# Patient Record
Sex: MALE | Race: WHITE | NOT HISPANIC OR LATINO | Employment: OTHER | URBAN - METROPOLITAN AREA
[De-identification: names, ages, dates, MRNs, and addresses within clinical notes are randomized per-mention and may not be internally consistent; named-entity substitution may affect disease eponyms.]

---

## 2017-06-12 ENCOUNTER — ALLSCRIPTS OFFICE VISIT (OUTPATIENT)
Dept: OTHER | Facility: OTHER | Age: 67
End: 2017-06-12

## 2017-06-12 DIAGNOSIS — M25.561 PAIN IN RIGHT KNEE: ICD-10-CM

## 2017-09-20 ENCOUNTER — ALLSCRIPTS OFFICE VISIT (OUTPATIENT)
Dept: OTHER | Facility: OTHER | Age: 67
End: 2017-09-20

## 2017-09-20 DIAGNOSIS — M10.9 GOUT: ICD-10-CM

## 2017-09-20 DIAGNOSIS — Z12.5 ENCOUNTER FOR SCREENING FOR MALIGNANT NEOPLASM OF PROSTATE: ICD-10-CM

## 2017-09-20 DIAGNOSIS — I35.0 NONRHEUMATIC AORTIC VALVE STENOSIS: ICD-10-CM

## 2017-09-20 DIAGNOSIS — I10 ESSENTIAL (PRIMARY) HYPERTENSION: ICD-10-CM

## 2017-09-20 DIAGNOSIS — I71.2 THORACIC AORTIC ANEURYSM WITHOUT RUPTURE (HCC): ICD-10-CM

## 2017-10-10 ENCOUNTER — TRANSCRIBE ORDERS (OUTPATIENT)
Dept: LAB | Facility: CLINIC | Age: 67
End: 2017-10-10

## 2017-10-10 ENCOUNTER — TRANSCRIBE ORDERS (OUTPATIENT)
Dept: ADMINISTRATIVE | Facility: HOSPITAL | Age: 67
End: 2017-10-10

## 2017-10-10 ENCOUNTER — APPOINTMENT (OUTPATIENT)
Dept: LAB | Facility: CLINIC | Age: 67
End: 2017-10-10
Payer: MEDICARE

## 2017-10-10 ENCOUNTER — GENERIC CONVERSION - ENCOUNTER (OUTPATIENT)
Dept: OTHER | Facility: OTHER | Age: 67
End: 2017-10-10

## 2017-10-10 DIAGNOSIS — M10.9 GOUT: ICD-10-CM

## 2017-10-10 DIAGNOSIS — Z12.5 ENCOUNTER FOR SCREENING FOR MALIGNANT NEOPLASM OF PROSTATE: ICD-10-CM

## 2017-10-10 DIAGNOSIS — D49.2 ODONTOGENIC TUMOR: Primary | ICD-10-CM

## 2017-10-10 DIAGNOSIS — I10 ESSENTIAL (PRIMARY) HYPERTENSION: ICD-10-CM

## 2017-10-10 LAB
ALBUMIN SERPL BCP-MCNC: 3.5 G/DL (ref 3.5–5)
ALP SERPL-CCNC: 80 U/L (ref 46–116)
ALT SERPL W P-5'-P-CCNC: 41 U/L (ref 12–78)
ANION GAP SERPL CALCULATED.3IONS-SCNC: 6 MMOL/L (ref 4–13)
AST SERPL W P-5'-P-CCNC: 29 U/L (ref 5–45)
BILIRUB SERPL-MCNC: 0.46 MG/DL (ref 0.2–1)
BUN SERPL-MCNC: 15 MG/DL (ref 5–25)
CALCIUM SERPL-MCNC: 9.3 MG/DL (ref 8.3–10.1)
CHLORIDE SERPL-SCNC: 106 MMOL/L (ref 100–108)
CHOLEST SERPL-MCNC: 279 MG/DL (ref 50–200)
CO2 SERPL-SCNC: 29 MMOL/L (ref 21–32)
CREAT SERPL-MCNC: 0.85 MG/DL (ref 0.6–1.3)
GFR SERPL CREATININE-BSD FRML MDRD: 90 ML/MIN/1.73SQ M
GLUCOSE P FAST SERPL-MCNC: 97 MG/DL (ref 65–99)
HDLC SERPL-MCNC: 47 MG/DL (ref 40–60)
LDLC SERPL CALC-MCNC: 158 MG/DL (ref 0–100)
POTASSIUM SERPL-SCNC: 4.9 MMOL/L (ref 3.5–5.3)
PROT SERPL-MCNC: 7.1 G/DL (ref 6.4–8.2)
PSA SERPL-MCNC: 3.4 NG/ML (ref 0–4)
SODIUM SERPL-SCNC: 141 MMOL/L (ref 136–145)
TRIGL SERPL-MCNC: 369 MG/DL
URATE SERPL-MCNC: 4.3 MG/DL (ref 4.2–8)

## 2017-10-10 PROCEDURE — 84550 ASSAY OF BLOOD/URIC ACID: CPT

## 2017-10-10 PROCEDURE — 80061 LIPID PANEL: CPT

## 2017-10-10 PROCEDURE — 80053 COMPREHEN METABOLIC PANEL: CPT

## 2017-10-10 PROCEDURE — G0103 PSA SCREENING: HCPCS

## 2017-10-10 PROCEDURE — 36415 COLL VENOUS BLD VENIPUNCTURE: CPT

## 2017-10-18 ENCOUNTER — GENERIC CONVERSION - ENCOUNTER (OUTPATIENT)
Dept: OTHER | Facility: OTHER | Age: 67
End: 2017-10-18

## 2017-10-18 ENCOUNTER — APPOINTMENT (OUTPATIENT)
Dept: CT IMAGING | Facility: HOSPITAL | Age: 67
End: 2017-10-18
Attending: OTOLARYNGOLOGY
Payer: MEDICARE

## 2017-10-18 ENCOUNTER — HOSPITAL ENCOUNTER (OUTPATIENT)
Dept: CT IMAGING | Facility: HOSPITAL | Age: 67
Discharge: HOME | End: 2017-10-18
Payer: MEDICARE

## 2017-10-18 ENCOUNTER — HOSPITAL ENCOUNTER (OUTPATIENT)
Dept: NON INVASIVE DIAGNOSTICS | Facility: HOSPITAL | Age: 67
Discharge: HOME | End: 2017-10-18
Payer: MEDICARE

## 2017-10-18 DIAGNOSIS — D49.2 ODONTOGENIC TUMOR: ICD-10-CM

## 2017-10-18 DIAGNOSIS — I71.2 THORACIC AORTIC ANEURYSM WITHOUT RUPTURE (HCC): ICD-10-CM

## 2017-10-18 DIAGNOSIS — I35.0 NONRHEUMATIC AORTIC VALVE STENOSIS: ICD-10-CM

## 2017-10-18 PROCEDURE — 71250 CT THORAX DX C-: CPT

## 2017-10-18 PROCEDURE — 93306 TTE W/DOPPLER COMPLETE: CPT

## 2017-10-18 PROCEDURE — 70450 CT HEAD/BRAIN W/O DYE: CPT

## 2017-10-19 ENCOUNTER — GENERIC CONVERSION - ENCOUNTER (OUTPATIENT)
Dept: OTHER | Facility: OTHER | Age: 67
End: 2017-10-19

## 2017-10-20 ENCOUNTER — GENERIC CONVERSION - ENCOUNTER (OUTPATIENT)
Dept: OTHER | Facility: OTHER | Age: 67
End: 2017-10-20

## 2017-11-20 ENCOUNTER — ALLSCRIPTS OFFICE VISIT (OUTPATIENT)
Dept: OTHER | Facility: OTHER | Age: 67
End: 2017-11-20

## 2017-11-21 NOTE — CONSULTS
Assessment  1  Colonic polyp (211 3) (K63 5)   2  Aortic stenosis (424 1) (I35 0)   3  Heart murmur (785 2) (R01 1)   4  Chronic diarrhea (787 91) (K52 9)    Plan  Colonic polyp    · OsmoPrep 1 102-0 398 GM Oral Tablet; TAKE AS DIRECTED   Rx By: Linda Olmedo; Dispense: 1 Days ; #:32 Tablet; Refill: 0;Colonic polyp; RENATE = N; Sent To: [facility] [address]   · Follow Up After Tests Complete Evaluation and Treatment  Follow-up  Status: Hold For -Scheduling  Requested for: [id number]   Ordered; For: Colonic polyp; Ordered By: Linda Olmedo Performed:  Due: 82PVN8453   · COLONOSCOPY (GI, SURG); Status:Hold For - Scheduling; Requested SVU:[id number];    Perform:[facility]; Order Comments:[facility]; JGI:[id number]; Ordered; For:Colonic polyp; Ordered By:Iraida Galarza;  Gout    · MethylPREDNISolone 4 MG Oral Tablet Therapy Pack   Rx By: Jeanette Wilkinson; Dispense: 0 Days ; #:1 X 21 Tablet Therapy Pack Bottle; Refill: 1;Gout; RENATE = N; Sent To: [facility] [address]; Last Updated By: Roman Mena; 11/20/2017 8:58:31 AM    Discussion/Summary  Discussion Summary:   I will schedule him for surveillance colonoscopy given his history of a precancerous colon polyp  He is overdue for colonoscopy based on his previous recommendation so I will schedule this for him in his earliest convenience  I spoke to him about the benefits and risks of the procedure as well as instructions for the bowel preparation and answered all of his questions  He prefers the [facility] is he used it the last time  I will schedule him for his procedure at the [facility] as he does not have any contraindications after reviewing his history in detail  He does have a history of aortic stenosis but his recent echocardiogram on October 18, 2017 did not show any evidence of aortic stenosis or other valvular abnormalities  He complains of mild intermittent chronic diarrhea   This is most likely due to irritable bowels but I will evaluate for microscopic colitis and inflammatory bowel disease during his upcoming colonoscopy  Chief Complaint  Chief Complaint Free Text Note Form: Colonoscopy consult  Pt states no active symptoms  Referred by Dr Cira Peña  History of Present Illness  HPI: He presents for evaluation for surveillance colonoscopy  His last colonoscopy was about 5 years ago when he had a precancerous polyp removed at that time  He was asked repeat the colonoscopy in 3 years  He complains of occasional diarrhea but denies nausea, vomiting, abdominal pain, bleeding, and weight loss  He also denies any family history of colon polyps or colon cancer  History Reviewed: The history was obtained today from the patient and I agree with the documented history  Review of Systems  Complete-Male GI Adult:  Constitutional: No fever or chills, feels well, no tiredness, no recent weight gain or weight loss  Eyes: No complaints of eye pain, no red eyes, no discharge from eyes, no itchy eyes  ENT: no complaints of earache, no hearing loss, no nosebleeds, no nasal discharge, no sore throat, no hoarseness  Cardiovascular: No complaints of slow heart rate, no fast heart rate, no chest pain, no palpitations, no leg claudication, no lower extremity  Respiratory: No complaints of shortness of breath, no wheezing, no cough, no SOB on exertion, no orthopnea or PND  Gastrointestinal: as noted in HPI  Genitourinary: No complaints of dysuria, no incontinence, no hesitancy, no nocturia, no genital lesion, no testicular pain  Musculoskeletal: No complaints of arthralgia, no myalgias, no joint swelling or stiffness, no limb pain or swelling  Integumentary: No complaints of skin rash or skin lesions, no itching, no skin wound, no dry skin  Neurological: No compliants of headache, no confusion, no convulsions, no numbness or tingling, no dizziness or fainting, no limb weakness, no difficulty walking    Psychiatric: Is not suicidal, no sleep disturbances, no anxiety or depression, no change in personality, no emotional problems  Endocrine: No complaints of proptosis, no hot flashes, no muscle weakness, no erectile dysfunction, no deepening of the voice, no feelings of weakness  Hematologic/Lymphatic: No complaints of swollen glands, no swollen glands in the neck, does not bleed easily, no easy bruising  ROS Reviewed:   ROS reviewed  Active Problems  1  Aneurysm of ascending aorta (441 2) (I71 2)   2  Aortic stenosis (424 1) (I35 0)   3  Colonic polyp (211 3) (K63 5)   4  Current every day smoker (305 1) (F17 200)   5  Exercise counseling (V65 41) (Z71 82)   6  Flu vaccine need (V04 81) (Z23)   7  Gout (274 9) (M10 9)   8  Heart murmur (785 2) (R01 1)   9  Hip Replacement   10  Hyperlipidemia (272 4) (E78 5)   11  Hypertension (401 9) (I10)   12  Joint effusion of knee (719 06) (M25 469)   13  Low back pain (724 2) (M54 5)   14  Malignant melanoma of skin (172 9) (C43 9)   15  Medicare annual wellness visit, initial (V70 0) (Z00 00)   16  Motion sickness (994 6) (T75 3XXA)   17  Need for pneumococcal vaccine (V03 82) (Z23)   18  Osteoarthritis of hip (715 95) (M16 9)   19  Rising PSA level (790 93) (R97 20)   20  Special screening examination for neoplasm of prostate (V76 44) (Z12 5)    Past Medical History  1  History of Abnormal chest CT (793 2) (R93 8)   2  History of Hip pain, acute (719 45) (M25 559)   3  History of acute pharyngitis (V12 69) (Z87 09)   4  History of balance disorder (V13 89) (Z87 898)   5  History of Lumbar radiculopathy (724 4) (M54 16)   6  History of Lumbar spondylosis (721 3) (M47 816)   7  History of Neck pain (723 1) (M54 2)   8  History of No significant past medical history   9  History of Trochanteric bursitis of left hip (726 5) (M70 62)  Active Problems And Past Medical History Reviewed: The active problems and past medical history were reviewed and updated today  Surgical History  1   History of Cholecystectomy   2  History of Total Hip Replacement  Surgical History Reviewed: The surgical history was reviewed and updated today  Family History  Father    1  Family history of Hypertension  Family History    2  Family history of Arthritis  Family History Reviewed: The family history was reviewed and updated today  Social History     · Alcohol use (V49 89) (Z78 9)   · Current every day smoker (305 1) (F17 200)  Social History Reviewed: The social history was reviewed and updated today  Current Meds   1  Allopurinol 100 MG Oral Tablet; Take 2 tablets daily  Increase to 3 tablets daily for persistent, recurrent gout; Therapy: 43Uhk7104 to (Willian Kirby)  Requested for: 92NAW5943; Last Rx:17Zks9336 Ordered   2  Aspirin 81 MG Oral Tablet Delayed Release; Take 1 tablet by mouth once daily; Therapy: 72TWA6616 to Recorded   3  Atorvastatin Calcium 20 MG Oral Tablet; TAKE 1 TABLET DAILY; Therapy: 57ZZF8278 to (Willian Kirby)  Requested for: 34OMK5047; Last Rx:22Olu3349 Ordered   4  Betamethasone Dipropionate Aug 0 05 % External Lotion; Therapy: 73BJI8584 to Recorded   5  Chantix Continuing Month Sean 1 MG Oral Tablet; TAKE 1 TABLET TWICE DAILY; Therapy: 40QYO8184 to (Evaluate:19Mar2018)  Requested for: 86VZC2067; Last Rx:53Ocj7315 Ordered   6  Chantix Starting Month Sean 0 5 MG X 11 & 1 MG X 42 Oral Tablet; TAKE AS DIRECTED PER PACKAGE INSTRUCTIONS; Therapy: 91KXB6836 to 78 220 82 17)  Requested for: 29ESR3199; Last Rx:21Enn2338 Ordered   7  MethylPREDNISolone 4 MG Oral Tablet Therapy Pack; Take as directed on pack instructions; Therapy: 43HFT2463 to (Last [facility])  Requested for: 41YNI0831 Ordered   8  Vitamin C 1000 MG Oral Tablet; TAKE 1 TABLET DAILY; Therapy: 04UUS8244 to Recorded  Medication List Reviewed: The medication list was reviewed and updated today  Allergies  1   No Known Drug Allergies    Vitals  Vital Signs    Recorded: 20Nov2017 08:58AM   Temperature 96 3 F, Tympanic   Heart Rate 64   Systolic 829, LUE, Sitting   Diastolic 92, LUE, Sitting   Height 6 ft 2 5 in   Weight 250 lb    BMI Calculated 31 67   BSA Calculated 2 4   O2 Saturation 96, RA       Physical Exam   Constitutional  General appearance: No acute distress, well appearing and well nourished  Eyes  Conjunctiva and lids: No swelling, erythema, or discharge  Pupils and irises: Equal, round and reactive to light  Ears, Nose, Mouth, and Throat  External inspection of ears and nose: Normal    Nasal mucosa, septum, and turbinates: Normal without edema or erythema  Oropharynx: Normal with no erythema, edema, exudate or lesions  Pulmonary  Respiratory effort: No increased work of breathing or signs of respiratory distress  Auscultation of lungs: Clear to auscultation, equal breath sounds bilaterally, no wheezes, no rales, no rhonci  Cardiovascular  Auscultation of heart: Normal rate and rhythm, normal S1 and S2, without murmurs  Examination of extremities for edema and/or varicosities: Normal    Abdomen  Abdomen: Non-tender, no masses  Liver and spleen: No hepatomegaly or splenomegaly  Lymphatic  Palpation of lymph nodes in neck: No lymphadenopathy  Musculoskeletal  Gait and station: Normal    Digits and nails: Normal without clubbing or cyanosis  Inspection/palpation of joints, bones, and muscles: Normal    Skin  Skin and subcutaneous tissue: Normal without rashes or lesions  Psychiatric  Orientation to person, place and time: Normal    Mood and affect: Normal          Future Appointments    Date/Time Provider Specialty Site   12/05/2017 01:00 PM Tad Guzman MD Gastroenterology Adult [address] ENDOSCOPY   06/11/2018 01:30 PM ANNEMARIE Earl   Family Medicine [address] [facility] FAMILY MEDICINE       Signatures   Electronically signed by : Merlin Lulas, MD; Nov 20 2017 10:07AM EST                       (Author)

## 2017-12-21 ENCOUNTER — GENERIC CONVERSION - ENCOUNTER (OUTPATIENT)
Dept: OTHER | Facility: OTHER | Age: 67
End: 2017-12-21

## 2017-12-29 ENCOUNTER — GENERIC CONVERSION - ENCOUNTER (OUTPATIENT)
Dept: OTHER | Facility: OTHER | Age: 67
End: 2017-12-29

## 2018-01-09 NOTE — RESULT NOTES
Verified Results  (1) COMPREHENSIVE METABOLIC PANEL [id number] 23:92YU [place] Sherley Uribe Order Number: [id number]     Test Name Result Flag Reference   SODIUM 141 mmol/L  136-145   POTASSIUM 4 9 mmol/L  3 5-5 3   CHLORIDE 106 mmol/L  100-108   CARBON DIOXIDE 29 mmol/L  21-32   ANION GAP (CALC) 6 mmol/L  4-13   BLOOD UREA NITROGEN 15 mg/dL  5-25   CREATININE 0 85 mg/dL  0 60-1 30   Standardized to IDMS reference method   CALCIUM 9 3 mg/dL  8 3-10 1   BILI, TOTAL 0 46 mg/dL  0 20-1 00   ALK PHOSPHATAS 80 U/L     ALT (SGPT) 41 U/L  12-78   Specimen collection should occur prior to Sulfasalazine and/or Sulfapyridine administration due to the potential for falsely depressed results  AST(SGOT) 29 U/L  5-45   Specimen collection should occur prior to Sulfasalazine administration due to the potential for falsely depressed results  ALBUMIN 3 5 g/dL  3 5-5 0   TOTAL PROTEIN 7 1 g/dL  6 4-8 2   eGFR 90 ml/min/1 73sq m     National Kidney Disease Education Program recommendations are as follows:  GFR calculation is accurate only with a steady state creatinine  Chronic Kidney disease less than 60 ml/min/1 73 sq  meters  Kidney failure less than 15 ml/min/1 73 sq  meters  GLUCOSE FASTING 97 mg/dL  65-99   Specimen collection should occur prior to Sulfasalazine administration due to the potential for falsely depressed results  Specimen collection should occur prior to Sulfapyridine administration due to the potential for falsely elevated results  (1) LIPID PANEL, FASTING [id number] 07:48AM Estelle Cardenas Order Number: [id number]     Test Name Result Flag Reference   CHOLESTEROL 279 mg/dL H    HDL,DIRECT 47 mg/dL  40-60   Specimen collection should occur prior to Metamizole administration due to the potential for falsley depressed results     LDL CHOLESTEROL CALCULATED 158 mg/dL H 0-100   Triglyceride:        Normal <150 mg/dl   Borderline High 150-199 mg/dl   High 200-499 mg/dl   Very High >499 mg/dl      Cholesterol:       Desirable <200 mg/dl    Borderline High 200-239 mg/dl    High >239 mg/dl      HDL Cholesterol:       High>59 mg/dL    Low <41 mg/dL      This screening LDL is a calculated result  It does not have the accuracy of the Direct Measured LDL in the monitoring of patients with hyperlipidemia and/or statin therapy  Direct Measure LDL (NDN154) must be ordered separately in these patients  TRIGLYCERIDES 369 mg/dL H <=150   Specimen collection should occur prior to N-Acetylcysteine or Metamizole administration due to the potential for falsely depressed results  (1) URIC ACID 19CDA1512 07:48AM [facility] Order Number: [id number]     Test Name Result Flag Reference   URIC ACID 4 3 mg/dL  4 2-8 0   Specimen collection should occur prior to Metamizole administration due to the potential for falsely depressed results

## 2018-01-10 NOTE — RESULT NOTES
Verified Results  ECHO COMPLETE WITH CONTRAST IF INDICATED [id number] 12:03PM Ning Gilman Order Number: [id number]    - Patient Instructions: To schedule this appointment, please contact Central Scheduling at [phone]  Test Name Result Flag Reference   ECHO COMPLETE WITH CONTRAST IF INDICATED (Report)     [address]  [place], [address]   [phone]     Transthoracic Echocardiogram   2D, M-mode, Doppler, and Color Doppler     Study date: 18-Oct-2017     Patient: Milana Arauz   MR number: [id number]   Account number: [de-identified]   : 1950   Age: 69 years   Gender: Male   Status: Outpatient   Location: Echo lab   Height: 74 in   Weight: 138 lb   BP: 148/ 90 mmHg     Indications: Aortic Valve     Diagnoses: I35 9 - Nonrheumatic aortic valve disorder, unspecified     Sonographer: KEITH Dorantes   Primary Physician: Eran Edwards MD   Referring Physician: Eran Edwards MD   [facility]   Interpreting Physician: Shahram Morales MD PhD     SUMMARY     LEFT VENTRICLE:   Systolic function was normal  Ejection fraction was estimated in the range of 65 %  There were no regional wall motion abnormalities  Doppler parameters were consistent with abnormal left ventricular relaxation (grade 1 diastolic dysfunction)  HISTORY: PRIOR HISTORY: Ascending aortic aneurysm, Smoker, HLD, HTN     PROCEDURE: The procedure was performed in the echo lab  This was a routine study  The transthoracic approach was used  The study included complete 2D imaging, M-mode, complete spectral Doppler, and color Doppler  The heart rate was 60 bpm,   at the start of the study  Images were obtained from the parasternal, apical, subcostal, and suprasternal notch acoustic windows  Echocardiographic views were limited due to decreased penetration and lung interference  This was a   technically difficult study       LEFT VENTRICLE: Size was normal  Systolic function was normal  Ejection fraction was estimated in the range of 65 %  There were no regional wall motion abnormalities  DOPPLER: Doppler parameters were consistent with abnormal left   ventricular relaxation (grade 1 diastolic dysfunction)  RIGHT VENTRICLE: The size was normal  Systolic function was normal      LEFT ATRIUM: Size was at the upper limits of normal      RIGHT ATRIUM: Size was normal      MITRAL VALVE: Valve structure was normal  There was normal leaflet separation  DOPPLER: The transmitral velocity was within the normal range  There was no evidence for stenosis  There was no regurgitation  AORTIC VALVE: The valve was trileaflet  Leaflets exhibited normal thickness and normal cuspal separation  DOPPLER: Transaortic velocity was within the normal range  There was no evidence for stenosis  There was no regurgitation  TRICUSPID VALVE: The valve structure was normal  There was normal leaflet separation  DOPPLER: The transtricuspid velocity was within the normal range  There was no evidence for stenosis  There was no significant regurgitation  PULMONIC VALVE: Not well visualized  DOPPLER: There was no regurgitation  PERICARDIUM: There was no pericardial effusion  AORTA: The root exhibited normal size       SYSTEM MEASUREMENT TABLES     2D   %FS: 45 2 %   Ao Diam: 3 9 cm   EDV(Teich): 128 1 ml   EF(Teich): 76 2 %   ESV(Teich): 30 5 ml   IVSd: 1 2 cm   LA Area: 21 6 cm2   LA Diam: 4 2 cm   LVEDV MOD A4C: 104 4 ml   LVEF MOD A4C: 74 4 %   LVESV MOD A4C: 26 7 ml   LVIDd: 5 2 cm   LVIDs: 2 8 cm   LVLd A4C: 8 2 cm   LVLs A4C: 6 2 cm   LVPWd: 1 2 cm   RA Area: 15 5 cm2   RVIDd: 3 6 cm   SV MOD A4C: 77 7 ml   SV(Teich): 97 6 ml     MM   TAPSE: 2 6 cm     PW   MV A Marc: 1 1 m/s   MV Dec Jerome: 2 5 m/s2   MV DecT: 297 1 ms   MV E Marc: 0 8 m/s   MV E/A Ratio: 0 7   MV PHT: 86 2 ms   MVA By PHT: 2 6 cm2     [facility]     Prepared and electronically signed by     Susan Gregory MD PhD   Signed 18-Oct-2017 15:44:14

## 2018-01-12 VITALS
HEIGHT: 75 IN | SYSTOLIC BLOOD PRESSURE: 142 MMHG | TEMPERATURE: 96.3 F | DIASTOLIC BLOOD PRESSURE: 92 MMHG | WEIGHT: 250 LBS | OXYGEN SATURATION: 96 % | BODY MASS INDEX: 31.08 KG/M2 | HEART RATE: 64 BPM

## 2018-01-12 NOTE — RESULT NOTES
Verified Results  (1) PSA (SCREEN) (Dx V76 44 Screen for Prostate Cancer) [id number] 07:48AM Aditya Chow Order Number: [id number]     Test Name Result Flag Reference   PROSTATE SPECIFIC ANTIGEN 3 4 ng/mL  0 0-4 0   American Urological Association Guidelines define biochemical recurrence of prostate cancer as a detectable or rising PSA value post-radical prostatectomy that is greater than or equal to 0 2 ng/mL with a second confirmatory level of greater than or equal to 0 2 ng/mL

## 2018-01-13 VITALS
WEIGHT: 241 LBS | RESPIRATION RATE: 16 BRPM | BODY MASS INDEX: 29.35 KG/M2 | DIASTOLIC BLOOD PRESSURE: 80 MMHG | HEART RATE: 60 BPM | HEIGHT: 76 IN | SYSTOLIC BLOOD PRESSURE: 130 MMHG

## 2018-01-13 NOTE — CONSULTS
Chief Complaint  Colonoscopy consult  Pt states no active symptoms  Referred by Dr Diane Woody  History of Present Illness  He presents for evaluation for surveillance colonoscopy  His last colonoscopy was about 5 years ago when he had a precancerous polyp removed at that time  He was asked repeat the colonoscopy in 3 years  He complains of occasional diarrhea but denies nausea, vomiting, abdominal pain, bleeding, and weight loss  He also denies any family history of colon polyps or colon cancer  The history was obtained today from the patient and I agree with the documented history  Review of Systems    Constitutional: No fever or chills, feels well, no tiredness, no recent weight gain or weight loss  Eyes: No complaints of eye pain, no red eyes, no discharge from eyes, no itchy eyes  ENT: no complaints of earache, no hearing loss, no nosebleeds, no nasal discharge, no sore throat, no hoarseness  Cardiovascular: No complaints of slow heart rate, no fast heart rate, no chest pain, no palpitations, no leg claudication, no lower extremity  Respiratory: No complaints of shortness of breath, no wheezing, no cough, no SOB on exertion, no orthopnea or PND  Gastrointestinal: as noted in HPI  Genitourinary: No complaints of dysuria, no incontinence, no hesitancy, no nocturia, no genital lesion, no testicular pain  Musculoskeletal: No complaints of arthralgia, no myalgias, no joint swelling or stiffness, no limb pain or swelling  Integumentary: No complaints of skin rash or skin lesions, no itching, no skin wound, no dry skin  Neurological: No compliants of headache, no confusion, no convulsions, no numbness or tingling, no dizziness or fainting, no limb weakness, no difficulty walking  Psychiatric: Is not suicidal, no sleep disturbances, no anxiety or depression, no change in personality, no emotional problems     Endocrine: No complaints of proptosis, no hot flashes, no muscle weakness, no erectile dysfunction, no deepening of the voice, no feelings of weakness  Hematologic/Lymphatic: No complaints of swollen glands, no swollen glands in the neck, does not bleed easily, no easy bruising  ROS reviewed  Active Problems    1  Aneurysm of ascending aorta (441 2) (I71 2)   2  Aortic stenosis (424 1) (I35 0)   3  Colonic polyp (211 3) (K63 5)   4  Current every day smoker (305 1) (F17 200)   5  Exercise counseling (V65 41) (Z71 82)   6  Flu vaccine need (V04 81) (Z23)   7  Gout (274 9) (M10 9)   8  Heart murmur (785 2) (R01 1)   9  Hip Replacement   10  Hyperlipidemia (272 4) (E78 5)   11  Hypertension (401 9) (I10)   12  Joint effusion of knee (719 06) (M25 469)   13  Low back pain (724 2) (M54 5)   14  Malignant melanoma of skin (172 9) (C43 9)   15  Medicare annual wellness visit, initial (V70 0) (Z00 00)   16  Motion sickness (994 6) (T75 3XXA)   17  Need for pneumococcal vaccine (V03 82) (Z23)   18  Osteoarthritis of hip (715 95) (M16 9)   19  Rising PSA level (790 93) (R97 20)   20  Special screening examination for neoplasm of prostate (V76 44) (Z12 5)    Past Medical History    · History of Abnormal chest CT (793 2) (R93 8)   · History of Hip pain, acute (719 45) (M25 559)   · History of acute pharyngitis (V12 69) (Z87 09)   · History of balance disorder (V13 89) (V56 396)   · History of Lumbar radiculopathy (724 4) (M54 16)   · History of Lumbar spondylosis (721 3) (M47 816)   · History of Neck pain (723 1) (M54 2)   · History of No significant past medical history   · History of Trochanteric bursitis of left hip (726 5) (M70 62)    The active problems and past medical history were reviewed and updated today  Surgical History    · History of Cholecystectomy   · History of Total Hip Replacement    The surgical history was reviewed and updated today         Family History    · Family history of Hypertension    · Family history of Arthritis    The family history was reviewed and updated today  Social History    · Alcohol use (V49 89) (Z78 9)   · Current every day smoker (305 1) (F17 200)   · 1 PPD  The social history was reviewed and updated today  Current Meds   1  Allopurinol 100 MG Oral Tablet; Take 2 tablets daily  Increase to 3 tablets daily for   persistent, recurrent gout; Therapy: 43Ewx9992 to [phone]  Requested for: 58RKB8928; Last   Rx:12Oct2017 Ordered   2  Aspirin 81 MG Oral Tablet Delayed Release; Take 1 tablet by mouth once daily; Therapy: 67QWB4451 to Recorded   3  Atorvastatin Calcium 20 MG Oral Tablet; TAKE 1 TABLET DAILY; Therapy: 56HOC8921 to [phone])  Requested for: 44JBS3675; Last   Rx:12Oct2017 Ordered   4  Betamethasone Dipropionate Aug 0 05 % External Lotion; Therapy: 24TVR3518 to Recorded   5  Chantix Continuing Month Sean 1 MG Oral Tablet; TAKE 1 TABLET TWICE DAILY; Therapy: 83RUA0067 to (Evaluate:19Mar2018)  Requested for: 09GTJ3904; Last   Rx:20Dzl6996 Ordered   6  Chantix Starting Month Sean 0 5 MG X 11 & 1 MG X 42 Oral Tablet; TAKE AS DIRECTED PER   PACKAGE INSTRUCTIONS; Therapy: 52UVP0480 to [phone]  Requested for: 95TCU2976; Last   Rx:08Erd6254 Ordered   7  MethylPREDNISolone 4 MG Oral Tablet Therapy Pack; Take as directed on pack   instructions; Therapy: 00IRJ8463 to (Last Latoniaund Nissen)  Requested for: 74GGM4299 Ordered   8  Vitamin C 1000 MG Oral Tablet; TAKE 1 TABLET DAILY; Therapy: 98CDR1289 to Recorded    The medication list was reviewed and updated today  Allergies    1  No Known Drug Allergies    Vitals   Recorded: 20Nov2017 08:58AM   Temperature 96 3 F, Tympanic   Heart Rate 64   Systolic 044, LUE, Sitting   Diastolic 92, LUE, Sitting   Height 6 ft 2 5 in   Weight 250 lb    BMI Calculated 31 67   BSA Calculated 2 4   O2 Saturation 96, RA     Physical Exam    Constitutional   General appearance: No acute distress, well appearing and well nourished      Eyes   Conjunctiva and lids: No swelling, erythema, or discharge  Pupils and irises: Equal, round and reactive to light  Ears, Nose, Mouth, and Throat   External inspection of ears and nose: Normal     Nasal mucosa, septum, and turbinates: Normal without edema or erythema  Oropharynx: Normal with no erythema, edema, exudate or lesions  Pulmonary   Respiratory effort: No increased work of breathing or signs of respiratory distress  Auscultation of lungs: Clear to auscultation, equal breath sounds bilaterally, no wheezes, no rales, no rhonci  Cardiovascular   Auscultation of heart: Normal rate and rhythm, normal S1 and S2, without murmurs  Examination of extremities for edema and/or varicosities: Normal     Abdomen   Abdomen: Non-tender, no masses  Liver and spleen: No hepatomegaly or splenomegaly  Lymphatic   Palpation of lymph nodes in neck: No lymphadenopathy  Musculoskeletal   Gait and station: Normal     Digits and nails: Normal without clubbing or cyanosis  Inspection/palpation of joints, bones, and muscles: Normal     Skin   Skin and subcutaneous tissue: Normal without rashes or lesions  Psychiatric   Orientation to person, place and time: Normal     Mood and affect: Normal          Assessment    1  Colonic polyp (211 3) (K63 5)   2  Aortic stenosis (424 1) (I35 0)   3  Heart murmur (785 2) (R01 1)   4  Chronic diarrhea (787 91) (K52 9)    Plan  Colonic polyp    · OsmoPrep 1 102-0 398 GM Oral Tablet; TAKE AS DIRECTED   Rx By: Khushbu Ulloa; Dispense: 1 Days ; #:32 Tablet; Refill: 0; For: Colonic polyp; RENATE = N; Sent To: [place] [phone]   · Follow Up After Tests Complete Evaluation and Treatment  Follow-up  Status: Hold For -  Scheduling  Requested for: 02BWO8249   Ordered; For: Colonic polyp; Ordered By: Khushbu Ulloa Performed:  Due: 82BUL9741   · COLONOSCOPY (GI, SURG); Status:Hold For - Scheduling; Requested FTH:94ZZY9089;    Perform:[facility]; Order Comments:[facility]; BETI:08QXQ2354; Ordered; For:Colonic polyp; Ordered By:Cullen Galarza;  Gout    · MethylPREDNISolone 4 MG Oral Tablet Therapy Pack   Rx By: Avelina Prieto; Dispense: 0 Days ; #:1 X 21 Tablet Therapy Pack Bottle; Refill: 1; For: Gout; RENATE = N; Sent To: [place] [address]; Last Updated By: Samantha Estrella; 11/20/2017 8:58:31 AM    Discussion/Summary    I will schedule him for surveillance colonoscopy given his history of a precancerous colon polyp  He is overdue for colonoscopy based on his previous recommendation so I will schedule this for him in his earliest convenience  I spoke to him about the benefits and risks of the procedure as well as instructions for the bowel preparation and answered all of his questions  He prefers the Pat Dials is he used it the last time  I will schedule him for his procedure at the [facility] as he does not have any contraindications after reviewing his history in detail  He does have a history of aortic stenosis but his recent echocardiogram on October 18, 2017 did not show any evidence of aortic stenosis or other valvular abnormalities  He complains of mild intermittent chronic diarrhea  This is most likely due to irritable bowels but I will evaluate for microscopic colitis and inflammatory bowel disease during his upcoming colonoscopy        Future Appointments    Signatures   Electronically signed by : Luz Maria Gastelum MD; Nov 20 2017 10:07AM EST                       (Author)

## 2018-01-14 NOTE — RESULT NOTES
Verified Results  (1) URIC ACID 18JAS7355 08:58AM Aditya Granadoss Order Number: [id number]     Test Name Result Flag Reference   URIC ACID 5 4 mg/dL  4 2-8 0   Specimen collection should occur prior to Metamizole administration due to the potential for falsely depressed results  (1) COMPREHENSIVE METABOLIC PANEL 99GUL3590 90:53AI Aditya Granadoss Order Number: [id number]     Test Name Result Flag Reference   GLUCOSE,RANDM 104 mg/dL     If the patient is fasting, the ADA then defines impaired fasting glucose as > 100 mg/dL and diabetes as > or equal to 123 mg/dL  SODIUM 137 mmol/L  136-145   POTASSIUM 4 5 mmol/L  3 5-5 3   CHLORIDE 103 mmol/L  100-108   CARBON DIOXIDE 28 mmol/L  21-32   ANION GAP (CALC) 6 mmol/L  4-13   BLOOD UREA NITROGEN 18 mg/dL  5-25   CREATININE 0 90 mg/dL  0 60-1 30   Standardized to IDMS reference method   CALCIUM 9 0 mg/dL  8 3-10 1   BILI, TOTAL 0 47 mg/dL  0 20-1 00   ALK PHOSPHATAS 81 U/L     ALT (SGPT) 26 U/L  12-78   AST(SGOT) 11 U/L  5-45   ALBUMIN 3 8 g/dL  3 5-5 0   TOTAL PROTEIN 6 9 g/dL  6 4-8 2   eGFR Non-African American      >60 0 ml/min/1 73sq m   [facility] Disease Education Program recommendations are as follows:  GFR calculation is accurate only with a steady state creatinine  Chronic Kidney disease less than 60 ml/min/1 73 sq  meters  Kidney failure less than 15 ml/min/1 73 sq  meters       (1) LDL,DIRECT 52ZPP2141 08:58AM Aditya Granadoss Order Number: [id number]     Test Name Result Flag Reference   LDL, DIRECT 123 mg/dl H 0-100   LDL Cholesterol:        Optimal          <100 mg/dl        Near Optimal     100-129 mg/dl        Above Optimal          Borderline High   130-159 mg/dl          High              160-189 mg/dl          Very High        >189 mg/dl     (1) CBC/PLT/DIFF 11Oct2016 08:58AM Aditya Godwin Kasey Order Number: [id number]     Test Name Result Flag Reference   WBC COUNT 6 65 Thousand/uL  4 31-10 16   RBC COUNT 5 14 Million/uL  3 88-5 62   HEMOGLOBIN 16 5 g/dL  12 0-17 0   HEMATOCRIT 47 4 %  36 5-49 3   MCV 92 fL  82-98   MCH 32 1 pg  26 8-34 3   MCHC 34 8 g/dL  31 4-37 4   RDW 13 0 %  11 6-15 1   MPV 10 9 fL  8 9-12 7   PLATELET COUNT 266 Thousands/uL  149-390   nRBC AUTOMATED 0 /100 WBCs     NEUTROPHILS RELATIVE PERCENT 55 %  43-75   LYMPHOCYTES RELATIVE PERCENT 31 %  14-44   MONOCYTES RELATIVE PERCENT 10 %  4-12   EOSINOPHILS RELATIVE PERCENT 3 %  0-6   BASOPHILS RELATIVE PERCENT 1 %  0-1   NEUTROPHILS ABSOLUTE COUNT 3 64 Thousands/?L  1 85-7 62   LYMPHOCYTES ABSOLUTE COUNT 2 06 Thousands/?L  0 60-4 47   MONOCYTES ABSOLUTE COUNT 0 69 Thousand/?L  0 17-1 22   EOSINOPHILS ABSOLUTE COUNT 0 17 Thousand/?L  0 00-0 61   BASOPHILS ABSOLUTE COUNT 0 07 Thousands/?L  0 00-0 10

## 2018-01-14 NOTE — RESULT NOTES
Verified Results  * CT CHEST 222 mYwindow 31UIC1894 07:50AM McGorrjeannie Gilliam Order Number: [id number]    - Patient Instructions: To schedule this appointment, please contact Central Scheduling at [phone]   Order Number: [id number]    - Patient Instructions: To schedule this appointment, please contact Central Scheduling at [phone]   Order Number: [id number]    - Patient Instructions: To schedule this appointment, please contact Central Scheduling at [phone]  Test Name Result Flag Reference   CT CHEST WO CONTRAST (Report)     CT CHEST WITHOUT IV CONTRAST     INDICATION: Thoracic aortic aneurysm  Follow-up  COMPARISON: CT chest dated October 14, 2015  TECHNIQUE: CT examination of the chest was performed without intravenous contrast  Axial, sagittal and coronal reformatted images were submitted for interpretation  Coronal thick section MIP (maximal intensity projection) images were also created  This examination, like all CT scans performed in the [facility], was performed utilizing techniques to minimize radiation dose exposure, including the use of iterative reconstruction and automated exposure control  FINDINGS:     LUNGS: Mild diffuse emphysematous lung changes are noted  There is no infiltrate or pleural effusion  There is no tracheal or endobronchial lesion  PLEURA: Unremarkable  HEART/GREAT VESSELS: Again noted is aneurysmal dilatation of the ascending thoracic aorta measuring 4 3 cm in diameter, stable  MEDIASTINUM AND PHILLIP: Unremarkable  CHEST WALL AND LOWER NECK: Unremarkable  VISUALIZED STRUCTURES IN THE UPPER ABDOMEN: The gallbladder surgically absent  OSSEOUS STRUCTURES: No acute fracture  No destructive osseous lesion  IMPRESSION:     Stable ascending thoracic aortic aneurysm measuring 4 3 cm in diameter         Workstation performed: [id number]     Signed by:   Junior Vance MD   10/28/16 VAS SCREENING 27Oct2016 07:50AM dAitya Gillis Order Number: [id number]    - Patient Instructions: To schedule this appointment, please contact Central Scheduling at [phone]   Order Number: [id number]    - Patient Instructions: To schedule this appointment, please contact Central Scheduling at [phone]  Test Name Result Flag Reference   VAS SCREENING (Report)     [facility] REPORT   CLINICAL:   Indications: Vascular screening for Carotid artery stenosis, abdominal aortic   aneurysm and peripheral arterial disease  Asymptomatic  Risk Factors   The patient has history of Hyperlipidemia and current smoking  Clinical   Right Pressure: 137/ mm Hg, Left Pressure: 123/ mm Hg  FINDINGS:      Unilateral        Impression         AP Diam (cm) PSV    Celiac Artery Expiration 1  Normal Aortic diameter      2 5  81       Right    Impression             PSV EDV  ALEXANDER Pressure    DorsPedis                         1 09    150    Dist CCA                     65  20            Post Tibial                        1 07    147    Prox  ICA  1  No significant carotid disease  64  19               Left     Impression             PSV EDV  ALEXANDER Pressure    DorsPedis                               141    Dist CCA                     67  18            Post Tibial                        1 13    155    Prox  ICA  1  No significant carotid disease  61  21                     CONCLUSION:   Impression      CAROTID SCREENING:   Evaluation reveals a normal internal carotid artery on the right  Evaluation reveals a normal internal carotid artery on the left  AORTA SCREENING:   The abdominal aorta is patent and normal in caliber with the greatest diameter   measurement of 2 53 cms  PAD SCREENING:   The ankle/brachial index on the right is 1 09, which is within normal limits  The ankle/brachial index on the left is 1 13, which is within normal limits        SIGNATURE:   Electronically Signed by: Maddy Goddard on 2016-10-27 09:07:10 PM

## 2018-01-15 VITALS
HEIGHT: 75 IN | DIASTOLIC BLOOD PRESSURE: 80 MMHG | SYSTOLIC BLOOD PRESSURE: 136 MMHG | HEART RATE: 72 BPM | BODY MASS INDEX: 17.16 KG/M2 | WEIGHT: 138 LBS | RESPIRATION RATE: 16 BRPM

## 2018-01-16 NOTE — MISCELLANEOUS
Chief Complaint  Chief Complaint Free Text Note Form: pt did not come in for [facility]      History of Present Illness  TCM Communication [facility]: The patient is being contacted for follow-up after hospitalization  Hospital records were not available  He was hospitalized at [facility]  The date of admission: 6/23/2016, date of discharge: 6/24/2016  Diagnosis: LTHR  He was discharged to home, with home health services  Medications were not reviewed today  Communication performed and completed by MATTI GODFREY [facility] RN      Active Problems     1  Flu vaccine need (V04 81) (Z23)   2  Heart murmur (785 2) (R01 1)   3  Low back pain (724 2) (M54 5)   4  Motion sickness (994 6) (T75 3XXA)   5  Osteoarthritis of hip (715 95) (M16 9)    Hyperlipidemia (272 4) (E78 5)       Hypertension (401 9) (I10)       Gout (274 9) (M10 9)       Current every day smoker (305 1) (F17 200)       Special screening examination for neoplasm of prostate (V76 44) (Z12 5)       Aneurysm of ascending aorta (441 2) (I71 2)       Malignant melanoma of skin (172 9) (C43 9)          Past Medical History    1  History of Abnormal chest CT (793 2) (R93 8)   2  History of Hip pain, acute (719 45) (M25 559)   3  History of acute pharyngitis (V12 69) (Z87 09)   4  History of balance disorder (V13 89) (Z87 898)   5  History of Lumbar radiculopathy (724 4) (M54 16)   6  History of Lumbar spondylosis (721 3) (M47 816)   7  History of Neck pain (723 1) (M54 2)   8  History of No significant past medical history   9  History of Trochanteric bursitis of left hip (726 5) (M70 62)    Surgical History    1  History of Cholecystectomy    Family History  Father    1  Family history of Hypertension  Family History    2  Family history of Arthritis    Social History   Never a smoker (V49 89) (Z78 9)       Current every day smoker (305 1) (F17 200)     - 1 PPD             Current Meds   1  Aspirin 81 MG Oral Tablet Delayed Release; Take 1 tablet by mouth once daily;    Therapy: 01LKW2591 to Recorded   2  Atorvastatin Calcium 20 MG Oral Tablet; TAKE 1 TABLET DAILY; Therapy: 92IPU7916 to (Evaluate:48Icj4563)  Requested for: 76ABT5893; Last   Rx:15Jun2016 Ordered   3  Celecoxib 200 MG Oral Capsule; TAKE 1 CAPSULE DAILY AS NEEDED; Therapy: 43UDM1848 to (Evaluate:13Rat8500)  Requested for: 42VEZ5210; Last   Rx:89Vyt6474 Ordered   4  MethylPREDNISolone 4 MG Oral Tablet Therapy Pack; Take as directed on pack   instructions; Therapy: 33HNP0119 to (Last Rx:54Mva5494)  Requested for: 15Jun2016 Ordered    Allergies    1  No Known Drug Allergies    Health Management  History of Abnormal chest CT   CT Thorax Chest With and Without Contrast; every 4 months; Next Due: 95NHQ6040; Overdue    Future Appointments    Date/Time Provider Specialty Site   11/20/2017 09:00 AM Duong Fischer MD [facility]enter[facility]   06/11/2018 01:30 PM ANNEMARIE Elizabeth   Family Medicine [address] [facility] FAMILY MEDICINE     Signatures   Electronically signed by : Omer Aguiar, ; Jun 27 2016  5:55PM EST                       (Author)    Electronically signed by : ANNEMARIE Brennan ; Sep 21 2017 10:14AM EST                       (Author)

## 2018-01-18 NOTE — RESULT NOTES
Verified Results  * CT CHEST WO CONTRAST 76UOG6817 12:00PM Tanesha Aviles Order Number: [id number]    - Patient Instructions: To schedule this appointment, please contact Central Scheduling at [phone]  Test Name Result Flag Reference   CT CHEST WO CONTRAST (Report)     CT CHEST WITHOUT IV CONTRAST     INDICATION: Follow-up thoracic aortic aneurysm  COMPARISON: 10/27/2016  TECHNIQUE: CT examination of the chest was performed without intravenous contrast  Reformatted images were created in axial, sagittal, and coronal planes  Radiation dose length product (DLP) for this visit: 1092 mGy-cm   This examination, like all CT scans performed in the [facility], was performed utilizing techniques to minimize radiation dose exposure, including the use of iterative    reconstruction and automated exposure control  FINDINGS:     LUNGS: Lungs are clear  There is no tracheal or endobronchial lesion  PLEURA: Unremarkable  HEART/GREAT VESSELS: Stable 4 3 cm ascending thoracic aortic aneurysm is identified  MEDIASTINUM AND PHILLIP: Unremarkable  CHEST WALL AND LOWER NECK:    Normal      VISUALIZED STRUCTURES IN THE UPPER ABDOMEN: Unremarkable  OSSEOUS STRUCTURES: No acute fracture  No destructive osseous lesion  IMPRESSION:     Stable 4 3 cm ascending thoracic aortic aneurysm  Workstation performed: [id number]     Signed by:    Bertrand Aguilar MD   10/20/17

## 2018-01-23 NOTE — MISCELLANEOUS
Message  GI Reminder Recall [facility]:   Date: 12/29/2017   Dear Sherry Pacheco:     Review of our records shows you are due for the following: Colonoscopy  Our records indicate that you are due at this time to have a follow-up examination for a colonoscopy  As you now, these tests are done to prevent colon cancer, a very common disease in the [place] and responsible for the thousands of patient deaths each year  We at [facility] are concerned for your health, and would very much appreciate you getting in touch with us at your earliest convenience, Again, this examination is vital to your proper health maintenance and for the prevention of cancer  and Follow Up Visit  Please call the following office to schedule your appointment:   [address], [address], [place], [address] [facility] [phone]  We look forward to hearing from you! Sincerely,     [facility]      Signatures   Electronically signed by :  Argentina Canchola, ; Dec 29 2017  2:57PM EST                       (Author)

## 2018-01-24 VITALS
BODY MASS INDEX: 30.39 KG/M2 | SYSTOLIC BLOOD PRESSURE: 136 MMHG | HEIGHT: 75 IN | TEMPERATURE: 98.4 F | HEART RATE: 60 BPM | WEIGHT: 244.38 LBS | DIASTOLIC BLOOD PRESSURE: 82 MMHG | RESPIRATION RATE: 16 BRPM

## 2018-04-10 DIAGNOSIS — R97.20 ELEVATED PROSTATE SPECIFIC ANTIGEN (PSA): ICD-10-CM

## 2018-05-31 DIAGNOSIS — M1A.0790 CHRONIC IDIOPATHIC GOUT INVOLVING TOE WITHOUT TOPHUS, UNSPECIFIED LATERALITY: Primary | ICD-10-CM

## 2018-05-31 RX ORDER — METHYLPREDNISOLONE 4 MG/1
TABLET ORAL
Qty: 21 TABLET | Refills: 0 | Status: SHIPPED | OUTPATIENT
Start: 2018-05-31 | End: 2018-10-09 | Stop reason: HOSPADM

## 2018-06-07 ENCOUNTER — APPOINTMENT (OUTPATIENT)
Dept: LAB | Facility: CLINIC | Age: 68
End: 2018-06-07
Payer: MEDICARE

## 2018-06-07 DIAGNOSIS — R97.20 ELEVATED PROSTATE SPECIFIC ANTIGEN (PSA): ICD-10-CM

## 2018-06-07 LAB — PSA SERPL-MCNC: 2.9 NG/ML (ref 0–4)

## 2018-06-07 PROCEDURE — 84153 ASSAY OF PSA TOTAL: CPT

## 2018-06-07 PROCEDURE — 36415 COLL VENOUS BLD VENIPUNCTURE: CPT

## 2018-06-11 ENCOUNTER — OFFICE VISIT (OUTPATIENT)
Dept: FAMILY MEDICINE CLINIC | Facility: CLINIC | Age: 68
End: 2018-06-11

## 2018-06-11 VITALS
WEIGHT: 241 LBS | SYSTOLIC BLOOD PRESSURE: 136 MMHG | OXYGEN SATURATION: 92 % | RESPIRATION RATE: 18 BRPM | HEART RATE: 72 BPM | DIASTOLIC BLOOD PRESSURE: 88 MMHG | BODY MASS INDEX: 29.97 KG/M2 | HEIGHT: 75 IN

## 2018-06-11 DIAGNOSIS — I35.0 NONRHEUMATIC AORTIC VALVE STENOSIS: ICD-10-CM

## 2018-06-11 DIAGNOSIS — Z23 NEED FOR ZOSTER VACCINE: ICD-10-CM

## 2018-06-11 DIAGNOSIS — I10 ESSENTIAL HYPERTENSION: ICD-10-CM

## 2018-06-11 DIAGNOSIS — Z11.59 NEED FOR HEPATITIS C SCREENING TEST: ICD-10-CM

## 2018-06-11 DIAGNOSIS — E78.00 PURE HYPERCHOLESTEROLEMIA: ICD-10-CM

## 2018-06-11 DIAGNOSIS — M1A.0720 IDIOPATHIC CHRONIC GOUT OF LEFT ANKLE WITHOUT TOPHUS: ICD-10-CM

## 2018-06-11 DIAGNOSIS — I71.2 ANEURYSM OF ASCENDING AORTA (HCC): Primary | ICD-10-CM

## 2018-06-11 DIAGNOSIS — Z23 NEED FOR TDAP VACCINATION: ICD-10-CM

## 2018-06-11 DIAGNOSIS — C43.9 MALIGNANT MELANOMA OF SKIN (HCC): ICD-10-CM

## 2018-06-11 PROBLEM — Z96.642 HISTORY OF LEFT HIP REPLACEMENT: Status: ACTIVE | Noted: 2017-09-20

## 2018-06-11 PROBLEM — K63.5 COLONIC POLYP: Status: ACTIVE | Noted: 2017-09-20

## 2018-06-11 PROBLEM — R97.20 RISING PSA LEVEL: Status: ACTIVE | Noted: 2017-10-12

## 2018-06-11 PROCEDURE — 99214 OFFICE O/P EST MOD 30 MIN: CPT | Performed by: FAMILY MEDICINE

## 2018-06-11 RX ORDER — BETAMETHASONE DIPROPIONATE 0.5 MG/ML
LOTION, AUGMENTED TOPICAL
COMMUNITY
Start: 2017-01-24 | End: 2018-06-11 | Stop reason: ALTCHOICE

## 2018-06-11 RX ORDER — ALLOPURINOL 100 MG/1
TABLET ORAL
COMMUNITY
Start: 2016-09-07 | End: 2018-06-11 | Stop reason: SDUPTHER

## 2018-06-11 RX ORDER — ATORVASTATIN CALCIUM 20 MG/1
1 TABLET, FILM COATED ORAL DAILY
COMMUNITY
Start: 2016-06-15 | End: 2018-06-11 | Stop reason: SINTOL

## 2018-06-11 RX ORDER — VARENICLINE TARTRATE 1 MG/1
1 TABLET, FILM COATED ORAL 2 TIMES DAILY
COMMUNITY
Start: 2017-09-20 | End: 2018-06-11 | Stop reason: ALTCHOICE

## 2018-06-11 RX ORDER — VARENICLINE TARTRATE 25 MG
KIT ORAL
COMMUNITY
Start: 2017-09-20 | End: 2018-06-11 | Stop reason: ALTCHOICE

## 2018-06-11 RX ORDER — MULTIVIT WITH MINERALS/LUTEIN
1 TABLET ORAL DAILY
COMMUNITY
Start: 2017-11-20

## 2018-06-11 NOTE — ASSESSMENT & PLAN NOTE
Continue to monitor lipids  He is not currently taking atorvastatin as he did have some myalgias from it  Try taking 1/2 tablet of his atorvastatin  He may stop it if he begins having myalgias again

## 2018-06-11 NOTE — PROGRESS NOTES
Assessment/Plan: Aortic stenosis  Consider repeating echocardiogram within the next year so  Pure hypercholesterolemia  Continue to monitor lipids  He is not currently taking atorvastatin as he did have some myalgias from it  Try taking 1/2 tablet of his atorvastatin  He may stop it if he begins having myalgias again  Diagnoses and all orders for this visit:    Need for zoster vaccine  -     Zoster Vac Recomb Adjuvanted ([address]) 50 MCG SUSR; Inject 0 5 mL into the shoulder, thigh, or buttocks once for 1 dose    Need for hepatitis C screening test  -     Hepatitis C antibody; Future    Need for Tdap vaccination  -     tetanus-diphtheria-acellular pertussis (BOOSTRIX) injection; Inject 0 5 mL into the shoulder, thigh, or buttocks once for 1 dose    Aneurysm of ascending aorta (HCC)    Essential hypertension    Nonrheumatic aortic valve stenosis    Malignant melanoma of skin (HCC)    Other orders  -     allopurinol (ZYLOPRIM) 100 mg tablet; Take by mouth  -     aspirin 81 MG tablet; Take 1 tablet by mouth daily  -     Discontinue: atorvastatin (LIPITOR) 20 mg tablet; Take 1 tablet by mouth daily  -     Discontinue: betamethasone, augmented, (DIPROLENE) 0 05 % lotion; Apply topically  -     Discontinue: varenicline (CHANTIX CONTINUING MONTH PAK) 1 mg tablet; Take 1 tablet by mouth 2 (two) times a day  -     Discontinue: varenicline (CHANTIX STARTING MONTH PAK) 0 5 MG X 11 & 1 MG X 42 tablet; Take by mouth  -     Discontinue: monobasic-dibasic sodium phosphates (OSMOPREP) 1 102-0 398 g; Take by mouth  -     Ascorbic Acid (VITAMIN C) 1000 MG tablet; Take 1 tablet by mouth daily  -     allopurinol (ZYLOPRIM) 100 mg tablet; Take 1 tablet (100 mg total) by mouth daily          Subjective:      Patient ID: Berny Pedroza is a 69 y o  male  HPI patient presents today for follow-up for his chronic health issues  He has no acute complaints  Unfortunately, he did not quit smoking    He notes he was able to stop smoking for 2 weeks but then had severe back pain and stopped his Chantix and started smoking in the pain did seem to improve  He remains interested in quitting smoking, but does not want to try any prescription medications at this time  He has a history of some aortic stenosis but denies any problems chest pain, shortness of breath or palpitations  He has chronic idiopathic gout, but is having no current severe arthralgias  He has had the most relief with Medrol Dosepaks, so I do typically prescribe him want to have on hand as he remains quite active  He has a history of hyperlipidemia and currently remains off statin therapy  He is interested in trying atorvastatin again, but he did have some slight myalgias on the 20 mg dose  He has plenty of tablets at home and would like to try 1/2 tablet  He has a history of melanoma and follows twice annually with Dermatology  He has a history of an ascending aortic aneurysm which has been monitored annually with CT chest   He denies chest pain or upper back pain  The following portions of the patient's history were reviewed and updated as appropriate: allergies, current medications, past family history, past medical history, past social history, past surgical history and problem list     Review of Systems   Constitutional: Negative for appetite change, chills, fatigue, fever and unexpected weight change  HENT: Negative for trouble swallowing  Eyes: Negative for visual disturbance  Respiratory: Negative for cough, chest tightness, shortness of breath and wheezing  Cardiovascular: Negative for chest pain  Gastrointestinal: Negative for abdominal distention, abdominal pain, blood in stool, constipation and diarrhea  Endocrine: Negative for polyuria  Genitourinary: Negative for difficulty urinating and flank pain  Musculoskeletal: Negative for arthralgias and myalgias  Skin: Negative for rash     Neurological: Negative for dizziness and light-headedness  Hematological: Negative for adenopathy  Does not bruise/bleed easily  Psychiatric/Behavioral: Negative for sleep disturbance  Objective:      /88   Pulse 72   Resp 18   Ht 6' 2 5" (1 892 m)   Wt 109 kg (241 lb)   SpO2 92%   BMI 30 53 kg/m²          Physical Exam   Constitutional: He is oriented to person, place, and time  He appears well-developed and well-nourished  No distress  HENT:   Head: Normocephalic  Eyes: Pupils are equal, round, and reactive to light  Neck: No tracheal deviation present  No thyromegaly present  Cardiovascular: Normal rate, regular rhythm and normal heart sounds  No murmur heard  Pulmonary/Chest: Effort normal  No respiratory distress  He has no wheezes  He has no rales  Abdominal: Soft  He exhibits no distension  There is no tenderness  Musculoskeletal: Normal range of motion  Neurological: He is alert and oriented to person, place, and time  No cranial nerve deficit  Skin: Skin is warm  He is not diaphoretic  Psychiatric: He has a normal mood and affect   Judgment and thought content normal

## 2018-06-12 RX ORDER — ALLOPURINOL 100 MG/1
100 TABLET ORAL DAILY
Qty: 90 TABLET | Refills: 3 | Status: SHIPPED | OUTPATIENT
Start: 2018-06-12 | End: 2018-10-01 | Stop reason: ALTCHOICE

## 2018-06-12 RX ORDER — ATORVASTATIN CALCIUM 20 MG/1
10 TABLET, FILM COATED ORAL DAILY
Refills: 0
Start: 2018-06-12 | End: 2018-10-01 | Stop reason: SINTOL

## 2018-07-30 ENCOUNTER — LAB REQUISITION (OUTPATIENT)
Dept: LAB | Facility: HOSPITAL | Age: 68
End: 2018-07-30
Payer: MEDICARE

## 2018-07-30 DIAGNOSIS — J34.81 NASAL MUCOSITIS (ULCERATIVE): ICD-10-CM

## 2018-07-30 PROCEDURE — 88312 SPECIAL STAINS GROUP 1: CPT | Performed by: PATHOLOGY

## 2018-07-30 PROCEDURE — 88342 IMHCHEM/IMCYTCHM 1ST ANTB: CPT | Performed by: PATHOLOGY

## 2018-07-30 PROCEDURE — 88305 TISSUE EXAM BY PATHOLOGIST: CPT | Performed by: PATHOLOGY

## 2018-09-28 RX ORDER — MUPIROCIN CALCIUM 20 MG/G
CREAM TOPICAL
Refills: 3 | COMMUNITY
Start: 2018-07-09 | End: 2018-10-01 | Stop reason: ALTCHOICE

## 2018-10-01 ENCOUNTER — OFFICE VISIT (OUTPATIENT)
Dept: FAMILY MEDICINE CLINIC | Facility: CLINIC | Age: 68
End: 2018-10-01
Payer: MEDICARE

## 2018-10-01 VITALS
SYSTOLIC BLOOD PRESSURE: 138 MMHG | HEART RATE: 72 BPM | HEIGHT: 75 IN | DIASTOLIC BLOOD PRESSURE: 88 MMHG | OXYGEN SATURATION: 94 % | RESPIRATION RATE: 18 BRPM | WEIGHT: 241 LBS | BODY MASS INDEX: 29.97 KG/M2

## 2018-10-01 DIAGNOSIS — M1A.0720 IDIOPATHIC CHRONIC GOUT OF LEFT ANKLE WITHOUT TOPHUS: ICD-10-CM

## 2018-10-01 DIAGNOSIS — C43.9 MALIGNANT MELANOMA OF SKIN (HCC): ICD-10-CM

## 2018-10-01 DIAGNOSIS — F17.219 CIGARETTE NICOTINE DEPENDENCE WITH NICOTINE-INDUCED DISORDER: ICD-10-CM

## 2018-10-01 DIAGNOSIS — I71.2 ANEURYSM OF ASCENDING AORTA (HCC): ICD-10-CM

## 2018-10-01 DIAGNOSIS — Z12.5 PROSTATE CANCER SCREENING: ICD-10-CM

## 2018-10-01 DIAGNOSIS — I10 ESSENTIAL HYPERTENSION: ICD-10-CM

## 2018-10-01 DIAGNOSIS — Z23 FLU VACCINE NEED: Primary | ICD-10-CM

## 2018-10-01 PROCEDURE — G0008 ADMIN INFLUENZA VIRUS VAC: HCPCS | Performed by: FAMILY MEDICINE

## 2018-10-01 PROCEDURE — 99214 OFFICE O/P EST MOD 30 MIN: CPT | Performed by: FAMILY MEDICINE

## 2018-10-01 PROCEDURE — 90662 IIV NO PRSV INCREASED AG IM: CPT | Performed by: FAMILY MEDICINE

## 2018-10-01 RX ORDER — VARENICLINE TARTRATE 1 MG/1
1 TABLET, FILM COATED ORAL 2 TIMES DAILY
Qty: 180 TABLET | Refills: 1 | Status: SHIPPED | OUTPATIENT
Start: 2018-10-01 | End: 2019-05-31 | Stop reason: ALTCHOICE

## 2018-10-01 RX ORDER — VARENICLINE TARTRATE 25 MG
KIT ORAL
Qty: 53 TABLET | Refills: 0 | Status: SHIPPED | OUTPATIENT
Start: 2018-10-01 | End: 2018-10-09 | Stop reason: HOSPADM

## 2018-10-01 NOTE — PROGRESS NOTES
Assessment/Plan: Aortic stenosis  Echocardiogram done last year showed no aortic stenosis  Consider repeat echo in the next few years  Aneurysm of ascending aorta (HCC)  Check CT chest    Essential hypertension  He has history of elevated blood pressure blood pressure is well controlled without any medications at this time  Cigarette nicotine dependence with nicotine-induced disorder  He remains interested in quitting smoking  We decided to retry Chantix  He can continue to smoke while on Chantix for at least a few weeks even a month or so as he may be able to better wean off of cigarettes  CT chest will be done to follow-up on his aneurysm but can also serve as lung cancer screening  Idiopathic chronic gout of left ankle without tophus  Check uric acid level off of allopurinol    Malignant melanoma of skin ([address] )  Continue with twice annual dermatology follow-up    Pure hypercholesterolemia  Check lipids  He has been off of atorvastatin for several months  Rising PSA level  Check PSA  Diagnoses and all orders for this visit:    Flu vaccine need  -     influenza vaccine, [id number], high-dose, PF 0 5 mL, for patients 65 yr+ (FLUZONE HIGH-DOSE)    Aneurysm of ascending aorta (HCC)  -     CT chest wo contrast; Future    Essential hypertension  -     Comprehensive metabolic panel; Future  -     CBC and differential; Future    Cigarette nicotine dependence with nicotine-induced disorder  -     varenicline (CHANTIX RAPHAEL) 0 5 MG X 11 & 1 MG X 42 tablet; Take one 0 5mg tab by mouth 1x daily for 3 days, then increase to one 0 5mg tab 2x daily for 3 days, then increase to one 1mg tab 2x daily  -     varenicline (CHANTIX) 1 mg tablet; Take 1 tablet (1 mg total) by mouth 2 (two) times a day    Malignant melanoma of skin (HCC)    Idiopathic chronic gout of left ankle without tophus  -     Uric acid; Future    Prostate cancer screening  -     PSA, Total Screen;  Future    Other orders  -     Discontinue: mupirocin (BACTROBAN) 2 % cream; JACKY THIN LAYER EXT AA TID PRN          Subjective:      Patient ID: Oswaldo Abernathy is a 69 y o  male  HPI patient presents today for follow-up for his chronic health issues  He did not tolerate atorvastatin secondary to severe myalgias  He does have known hyperlipidemia  He has been off atorvastatin for several months  He does continue to smoke  He tried Chantix but only took it for few weeks  He tried to quit smoking after 3 days on Chantix but it only lasted a few days  He is a lifelong nonsmoker and smokes pack a day  He has a history of an ascending aortic aneurysm which was stable on CT of his chest last year  He had a previous diagnosis of questionable aortic stenosis but his echocardiogram osteo did not reveal aortic stenosis  He has history of hypertension but is not currently on medication  He denies any problems a headache, blurry vision, chest pain, shortness of breath or palpitations  His history of gout  He ran out of allopurinol and is not taking it currently  He fortunately has not had any recurrent arthralgias  He has a history of melanoma and follows twice daily with Dermatology  The following portions of the patient's history were reviewed and updated as appropriate: allergies, current medications, past family history, past medical history, past social history, past surgical history and problem list     Review of Systems   Constitutional: Negative for appetite change, chills, fatigue, fever and unexpected weight change  HENT: Negative for trouble swallowing  Eyes: Negative for visual disturbance  Respiratory: Negative for cough, chest tightness, shortness of breath and wheezing  Cardiovascular: Negative for chest pain  Gastrointestinal: Negative for abdominal distention, abdominal pain, blood in stool, constipation and diarrhea  Endocrine: Negative for polyuria  Genitourinary: Negative for difficulty urinating and flank pain  Musculoskeletal: Negative for arthralgias and myalgias  Skin: Negative for rash  Neurological: Negative for dizziness and light-headedness  Hematological: Negative for adenopathy  Does not bruise/bleed easily  Psychiatric/Behavioral: Negative for sleep disturbance  Objective:      /88   Pulse 72   Resp 18   Ht 6' 2 5" (1 892 m)   Wt 109 kg (241 lb)   SpO2 94%   BMI 30 53 kg/m²          Physical Exam   Constitutional: He is oriented to person, place, and time  He appears well-developed and well-nourished  No distress  HENT:   Head: Normocephalic  Eyes: Pupils are equal, round, and reactive to light  Neck: No tracheal deviation present  No thyromegaly present  Cardiovascular: Normal rate, regular rhythm and normal heart sounds  No murmur heard  Pulmonary/Chest: Effort normal  No respiratory distress  He has no wheezes  He has no rales  Abdominal: Soft  He exhibits no distension  There is no tenderness  Musculoskeletal: Normal range of motion  He exhibits no edema or tenderness  Neurological: He is alert and oriented to person, place, and time  No cranial nerve deficit  Skin: Skin is warm  He is not diaphoretic  Chronically sekou complected   Psychiatric: He has a normal mood and affect   Judgment and thought content normal

## 2018-10-01 NOTE — ASSESSMENT & PLAN NOTE
He remains interested in quitting smoking  We decided to retry Chantix  He can continue to smoke while on Chantix for at least a few weeks even a month or so as he may be able to better wean off of cigarettes  CT chest will be done to follow-up on his aneurysm but can also serve as lung cancer screening

## 2018-10-01 NOTE — ASSESSMENT & PLAN NOTE
He has history of elevated blood pressure blood pressure is well controlled without any medications at this time

## 2018-10-01 NOTE — ASSESSMENT & PLAN NOTE
Echocardiogram done last year showed no aortic stenosis  Consider repeat echo in the next few years

## 2018-10-03 ENCOUNTER — APPOINTMENT (OUTPATIENT)
Dept: LAB | Facility: CLINIC | Age: 68
End: 2018-10-03
Payer: MEDICARE

## 2018-10-03 DIAGNOSIS — I10 ESSENTIAL HYPERTENSION: ICD-10-CM

## 2018-10-03 DIAGNOSIS — M1A.0720 IDIOPATHIC CHRONIC GOUT OF LEFT ANKLE WITHOUT TOPHUS: ICD-10-CM

## 2018-10-03 DIAGNOSIS — Z12.5 PROSTATE CANCER SCREENING: ICD-10-CM

## 2018-10-03 DIAGNOSIS — Z11.59 NEED FOR HEPATITIS C SCREENING TEST: ICD-10-CM

## 2018-10-03 LAB
ALBUMIN SERPL BCP-MCNC: 3.7 G/DL (ref 3.5–5)
ALP SERPL-CCNC: 93 U/L (ref 46–116)
ALT SERPL W P-5'-P-CCNC: 42 U/L (ref 12–78)
ANION GAP SERPL CALCULATED.3IONS-SCNC: 5 MMOL/L (ref 4–13)
AST SERPL W P-5'-P-CCNC: 20 U/L (ref 5–45)
BASOPHILS # BLD AUTO: 0.07 THOUSANDS/ΜL (ref 0–0.1)
BASOPHILS NFR BLD AUTO: 1 % (ref 0–1)
BILIRUB SERPL-MCNC: 0.7 MG/DL (ref 0.2–1)
BUN SERPL-MCNC: 14 MG/DL (ref 5–25)
CALCIUM SERPL-MCNC: 9.3 MG/DL (ref 8.3–10.1)
CHLORIDE SERPL-SCNC: 103 MMOL/L (ref 100–108)
CO2 SERPL-SCNC: 28 MMOL/L (ref 21–32)
CREAT SERPL-MCNC: 1.06 MG/DL (ref 0.6–1.3)
EOSINOPHIL # BLD AUTO: 0.19 THOUSAND/ΜL (ref 0–0.61)
EOSINOPHIL NFR BLD AUTO: 3 % (ref 0–6)
ERYTHROCYTE [DISTWIDTH] IN BLOOD BY AUTOMATED COUNT: 12.6 % (ref 11.6–15.1)
GFR SERPL CREATININE-BSD FRML MDRD: 71 ML/MIN/1.73SQ M
GLUCOSE P FAST SERPL-MCNC: 95 MG/DL (ref 65–99)
HCT VFR BLD AUTO: 51.5 % (ref 36.5–49.3)
HGB BLD-MCNC: 17.1 G/DL (ref 12–17)
IMM GRANULOCYTES # BLD AUTO: 0.05 THOUSAND/UL (ref 0–0.2)
IMM GRANULOCYTES NFR BLD AUTO: 1 % (ref 0–2)
LYMPHOCYTES # BLD AUTO: 1.79 THOUSANDS/ΜL (ref 0.6–4.47)
LYMPHOCYTES NFR BLD AUTO: 30 % (ref 14–44)
MCH RBC QN AUTO: 31.7 PG (ref 26.8–34.3)
MCHC RBC AUTO-ENTMCNC: 33.2 G/DL (ref 31.4–37.4)
MCV RBC AUTO: 95 FL (ref 82–98)
MONOCYTES # BLD AUTO: 0.66 THOUSAND/ΜL (ref 0.17–1.22)
MONOCYTES NFR BLD AUTO: 11 % (ref 4–12)
NEUTROPHILS # BLD AUTO: 3.21 THOUSANDS/ΜL (ref 1.85–7.62)
NEUTS SEG NFR BLD AUTO: 54 % (ref 43–75)
NRBC BLD AUTO-RTO: 0 /100 WBCS
PLATELET # BLD AUTO: 172 THOUSANDS/UL (ref 149–390)
PMV BLD AUTO: 11 FL (ref 8.9–12.7)
POTASSIUM SERPL-SCNC: 4.6 MMOL/L (ref 3.5–5.3)
PROT SERPL-MCNC: 7.7 G/DL (ref 6.4–8.2)
PSA SERPL-MCNC: 3.1 NG/ML (ref 0–4)
RBC # BLD AUTO: 5.4 MILLION/UL (ref 3.88–5.62)
SODIUM SERPL-SCNC: 136 MMOL/L (ref 136–145)
URATE SERPL-MCNC: 9.1 MG/DL (ref 4.2–8)
WBC # BLD AUTO: 5.97 THOUSAND/UL (ref 4.31–10.16)

## 2018-10-03 PROCEDURE — 36415 COLL VENOUS BLD VENIPUNCTURE: CPT

## 2018-10-03 PROCEDURE — 84550 ASSAY OF BLOOD/URIC ACID: CPT

## 2018-10-03 PROCEDURE — 80053 COMPREHEN METABOLIC PANEL: CPT

## 2018-10-03 PROCEDURE — 85025 COMPLETE CBC W/AUTO DIFF WBC: CPT

## 2018-10-03 PROCEDURE — G0103 PSA SCREENING: HCPCS

## 2018-10-03 PROCEDURE — 86803 HEPATITIS C AB TEST: CPT

## 2018-10-04 LAB — HCV AB SER QL: NORMAL

## 2018-10-07 ENCOUNTER — HOSPITAL ENCOUNTER (OUTPATIENT)
Dept: CT IMAGING | Facility: HOSPITAL | Age: 68
Discharge: HOME | End: 2018-10-07
Payer: MEDICARE

## 2018-10-07 DIAGNOSIS — I71.2 ANEURYSM OF ASCENDING AORTA (HCC): ICD-10-CM

## 2018-10-07 DIAGNOSIS — M1A.09X0 CHRONIC GOUT OF MULTIPLE SITES, UNSPECIFIED CAUSE: Primary | ICD-10-CM

## 2018-10-07 PROCEDURE — 71250 CT THORAX DX C-: CPT

## 2018-10-08 ENCOUNTER — APPOINTMENT (EMERGENCY)
Dept: CT IMAGING | Facility: HOSPITAL | Age: 68
End: 2018-10-08
Payer: MEDICARE

## 2018-10-08 ENCOUNTER — HOSPITAL ENCOUNTER (OUTPATIENT)
Facility: HOSPITAL | Age: 68
Setting detail: OBSERVATION
Discharge: HOME | End: 2018-10-09
Attending: EMERGENCY MEDICINE | Admitting: HOSPITALIST
Payer: MEDICARE

## 2018-10-08 DIAGNOSIS — R07.9 CHEST PAIN: Primary | ICD-10-CM

## 2018-10-08 DIAGNOSIS — T78.40XA ALLERGIC REACTION, INITIAL ENCOUNTER: ICD-10-CM

## 2018-10-08 DIAGNOSIS — M1A.09X0 CHRONIC GOUT OF MULTIPLE SITES, UNSPECIFIED CAUSE: ICD-10-CM

## 2018-10-08 DIAGNOSIS — T78.2XXD ANAPHYLAXIS, SUBSEQUENT ENCOUNTER: ICD-10-CM

## 2018-10-08 PROBLEM — Z72.0 TOBACCO ABUSE: Status: ACTIVE | Noted: 2018-10-08

## 2018-10-08 LAB
ALBUMIN SERPL BCP-MCNC: 3.9 G/DL (ref 3.5–5)
ALP SERPL-CCNC: 88 U/L (ref 46–116)
ALT SERPL W P-5'-P-CCNC: 46 U/L (ref 12–78)
ANION GAP BLD CALC-SCNC: 17 MMOL/L (ref 4–13)
ANION GAP SERPL CALCULATED.3IONS-SCNC: 11 MMOL/L (ref 4–13)
APTT PPP: 23 SECONDS (ref 24–36)
AST SERPL W P-5'-P-CCNC: 37 U/L (ref 5–45)
ATRIAL RATE: 62 BPM
ATRIAL RATE: 65 BPM
ATRIAL RATE: 68 BPM
BASOPHILS # BLD AUTO: 0.09 THOUSANDS/ΜL (ref 0–0.1)
BASOPHILS NFR BLD AUTO: 1 % (ref 0–1)
BILIRUB SERPL-MCNC: 0.53 MG/DL (ref 0.2–1)
BUN BLD-MCNC: 19 MG/DL (ref 5–25)
BUN SERPL-MCNC: 18 MG/DL (ref 5–25)
CA-I BLD-SCNC: 1.21 MMOL/L (ref 1.12–1.32)
CALCIUM SERPL-MCNC: 9.9 MG/DL (ref 8.3–10.1)
CHLORIDE BLD-SCNC: 102 MMOL/L (ref 100–108)
CHLORIDE SERPL-SCNC: 103 MMOL/L (ref 100–108)
CO2 SERPL-SCNC: 24 MMOL/L (ref 21–32)
CREAT BLD-MCNC: 1 MG/DL (ref 0.6–1.3)
CREAT SERPL-MCNC: 0.92 MG/DL (ref 0.6–1.3)
EOSINOPHIL # BLD AUTO: 0.14 THOUSAND/ΜL (ref 0–0.61)
EOSINOPHIL NFR BLD AUTO: 2 % (ref 0–6)
ERYTHROCYTE [DISTWIDTH] IN BLOOD BY AUTOMATED COUNT: 12.2 % (ref 11.6–15.1)
GFR SERPL CREATININE-BSD FRML MDRD: 76 ML/MIN/1.73SQ M
GFR SERPL CREATININE-BSD FRML MDRD: 85 ML/MIN/1.73SQ M
GLUCOSE SERPL-MCNC: 104 MG/DL (ref 65–140)
GLUCOSE SERPL-MCNC: 95 MG/DL (ref 65–140)
HCT VFR BLD AUTO: 49.9 % (ref 36.5–49.3)
HCT VFR BLD CALC: 51 % (ref 36.5–49.3)
HGB BLD-MCNC: 17.2 G/DL (ref 12–17)
HGB BLDA-MCNC: 17.3 G/DL (ref 12–17)
IMM GRANULOCYTES # BLD AUTO: 0.02 THOUSAND/UL (ref 0–0.2)
IMM GRANULOCYTES NFR BLD AUTO: 0 % (ref 0–2)
INR PPP: 0.88 (ref 0.86–1.17)
LYMPHOCYTES # BLD AUTO: 1.8 THOUSANDS/ΜL (ref 0.6–4.47)
LYMPHOCYTES NFR BLD AUTO: 23 % (ref 14–44)
MCH RBC QN AUTO: 31.8 PG (ref 26.8–34.3)
MCHC RBC AUTO-ENTMCNC: 34.5 G/DL (ref 31.4–37.4)
MCV RBC AUTO: 92 FL (ref 82–98)
MONOCYTES # BLD AUTO: 0.73 THOUSAND/ΜL (ref 0.17–1.22)
MONOCYTES NFR BLD AUTO: 10 % (ref 4–12)
NEUTROPHILS # BLD AUTO: 4.92 THOUSANDS/ΜL (ref 1.85–7.62)
NEUTS SEG NFR BLD AUTO: 64 % (ref 43–75)
NRBC BLD AUTO-RTO: 0 /100 WBCS
NT-PROBNP SERPL-MCNC: 133 PG/ML
P AXIS: 47 DEGREES
P AXIS: 58 DEGREES
P AXIS: 68 DEGREES
PCO2 BLD: 26 MMOL/L (ref 21–32)
PLATELET # BLD AUTO: 155 THOUSANDS/UL (ref 149–390)
PLATELET # BLD AUTO: 176 THOUSANDS/UL (ref 149–390)
PMV BLD AUTO: 10.1 FL (ref 8.9–12.7)
PMV BLD AUTO: 9.7 FL (ref 8.9–12.7)
POTASSIUM BLD-SCNC: 4.2 MMOL/L (ref 3.5–5.3)
POTASSIUM SERPL-SCNC: 4.7 MMOL/L (ref 3.5–5.3)
PR INTERVAL: 160 MS
PR INTERVAL: 164 MS
PR INTERVAL: 168 MS
PROT SERPL-MCNC: 7.7 G/DL (ref 6.4–8.2)
PROTHROMBIN TIME: 12 SECONDS (ref 11.8–14.2)
QRS AXIS: -50 DEGREES
QRS AXIS: -52 DEGREES
QRS AXIS: -56 DEGREES
QRSD INTERVAL: 88 MS
QRSD INTERVAL: 92 MS
QRSD INTERVAL: 96 MS
QT INTERVAL: 406 MS
QT INTERVAL: 418 MS
QT INTERVAL: 424 MS
QTC INTERVAL: 424 MS
QTC INTERVAL: 431 MS
QTC INTERVAL: 440 MS
RBC # BLD AUTO: 5.41 MILLION/UL (ref 3.88–5.62)
SODIUM BLD-SCNC: 140 MMOL/L (ref 136–145)
SODIUM SERPL-SCNC: 138 MMOL/L (ref 136–145)
SPECIMEN SOURCE: ABNORMAL
T WAVE AXIS: 41 DEGREES
T WAVE AXIS: 61 DEGREES
T WAVE AXIS: 69 DEGREES
TROPONIN I SERPL-MCNC: <0.02 NG/ML
VENTRICULAR RATE: 62 BPM
VENTRICULAR RATE: 65 BPM
VENTRICULAR RATE: 68 BPM
WBC # BLD AUTO: 7.7 THOUSAND/UL (ref 4.31–10.16)

## 2018-10-08 PROCEDURE — 99285 EMERGENCY DEPT VISIT HI MDM: CPT

## 2018-10-08 PROCEDURE — 96372 THER/PROPH/DIAG INJ SC/IM: CPT

## 2018-10-08 PROCEDURE — 80047 BASIC METABLC PNL IONIZED CA: CPT

## 2018-10-08 PROCEDURE — 96374 THER/PROPH/DIAG INJ IV PUSH: CPT

## 2018-10-08 PROCEDURE — 85025 COMPLETE CBC W/AUTO DIFF WBC: CPT | Performed by: EMERGENCY MEDICINE

## 2018-10-08 PROCEDURE — 84484 ASSAY OF TROPONIN QUANT: CPT | Performed by: HOSPITALIST

## 2018-10-08 PROCEDURE — 96375 TX/PRO/DX INJ NEW DRUG ADDON: CPT

## 2018-10-08 PROCEDURE — 85610 PROTHROMBIN TIME: CPT | Performed by: EMERGENCY MEDICINE

## 2018-10-08 PROCEDURE — 93005 ELECTROCARDIOGRAM TRACING: CPT

## 2018-10-08 PROCEDURE — 84484 ASSAY OF TROPONIN QUANT: CPT | Performed by: EMERGENCY MEDICINE

## 2018-10-08 PROCEDURE — 74175 CTA ABDOMEN W/CONTRAST: CPT

## 2018-10-08 PROCEDURE — 80053 COMPREHEN METABOLIC PANEL: CPT | Performed by: EMERGENCY MEDICINE

## 2018-10-08 PROCEDURE — 36415 COLL VENOUS BLD VENIPUNCTURE: CPT | Performed by: EMERGENCY MEDICINE

## 2018-10-08 PROCEDURE — 93010 ELECTROCARDIOGRAM REPORT: CPT | Performed by: INTERNAL MEDICINE

## 2018-10-08 PROCEDURE — 71275 CT ANGIOGRAPHY CHEST: CPT

## 2018-10-08 PROCEDURE — 85014 HEMATOCRIT: CPT

## 2018-10-08 PROCEDURE — 85730 THROMBOPLASTIN TIME PARTIAL: CPT | Performed by: EMERGENCY MEDICINE

## 2018-10-08 PROCEDURE — 83880 ASSAY OF NATRIURETIC PEPTIDE: CPT | Performed by: EMERGENCY MEDICINE

## 2018-10-08 PROCEDURE — 96361 HYDRATE IV INFUSION ADD-ON: CPT

## 2018-10-08 PROCEDURE — 85049 AUTOMATED PLATELET COUNT: CPT | Performed by: HOSPITALIST

## 2018-10-08 PROCEDURE — 99220 PR INITIAL OBSERVATION CARE/DAY 70 MINUTES: CPT | Performed by: HOSPITALIST

## 2018-10-08 PROCEDURE — 96376 TX/PRO/DX INJ SAME DRUG ADON: CPT

## 2018-10-08 RX ORDER — NICOTINE 21 MG/24HR
21 PATCH, TRANSDERMAL 24 HOURS TRANSDERMAL DAILY
Status: DISCONTINUED | OUTPATIENT
Start: 2018-10-09 | End: 2018-10-09 | Stop reason: HOSPADM

## 2018-10-08 RX ORDER — PREDNISONE 20 MG/1
60 TABLET ORAL DAILY
Status: DISCONTINUED | OUTPATIENT
Start: 2018-10-09 | End: 2018-10-09 | Stop reason: HOSPADM

## 2018-10-08 RX ORDER — ASCORBIC ACID 500 MG
1000 TABLET ORAL DAILY
Status: DISCONTINUED | OUTPATIENT
Start: 2018-10-09 | End: 2018-10-09 | Stop reason: HOSPADM

## 2018-10-08 RX ORDER — MORPHINE SULFATE 4 MG/ML
4 INJECTION, SOLUTION INTRAMUSCULAR; INTRAVENOUS ONCE
Status: COMPLETED | OUTPATIENT
Start: 2018-10-08 | End: 2018-10-08

## 2018-10-08 RX ORDER — METHYLPREDNISOLONE SODIUM SUCCINATE 125 MG/2ML
125 INJECTION, POWDER, LYOPHILIZED, FOR SOLUTION INTRAMUSCULAR; INTRAVENOUS ONCE
Status: COMPLETED | OUTPATIENT
Start: 2018-10-08 | End: 2018-10-08

## 2018-10-08 RX ORDER — NITROGLYCERIN 0.4 MG/1
0.4 TABLET SUBLINGUAL ONCE
Status: COMPLETED | OUTPATIENT
Start: 2018-10-08 | End: 2018-10-08

## 2018-10-08 RX ORDER — ACETAMINOPHEN 325 MG/1
650 TABLET ORAL EVERY 6 HOURS PRN
Status: DISCONTINUED | OUTPATIENT
Start: 2018-10-08 | End: 2018-10-09 | Stop reason: HOSPADM

## 2018-10-08 RX ORDER — ALLOPURINOL 100 MG/1
TABLET ORAL
Qty: 180 TABLET | Refills: 3 | Status: ON HOLD | OUTPATIENT
Start: 2018-10-08 | End: 2018-10-08 | Stop reason: SDUPTHER

## 2018-10-08 RX ORDER — ALLOPURINOL 100 MG/1
TABLET ORAL
Qty: 270 TABLET | Refills: 3 | Status: SHIPPED | OUTPATIENT
Start: 2018-10-08 | End: 2020-05-14 | Stop reason: SDUPTHER

## 2018-10-08 RX ORDER — ASPIRIN 81 MG/1
81 TABLET, CHEWABLE ORAL DAILY
Status: DISCONTINUED | OUTPATIENT
Start: 2018-10-09 | End: 2018-10-09 | Stop reason: HOSPADM

## 2018-10-08 RX ORDER — ASPIRIN 81 MG/1
324 TABLET, CHEWABLE ORAL ONCE
Status: COMPLETED | OUTPATIENT
Start: 2018-10-08 | End: 2018-10-08

## 2018-10-08 RX ORDER — ALLOPURINOL 100 MG/1
100 TABLET ORAL 2 TIMES DAILY
Status: DISCONTINUED | OUTPATIENT
Start: 2018-10-08 | End: 2018-10-09 | Stop reason: HOSPADM

## 2018-10-08 RX ORDER — HYDROCODONE BITARTRATE AND ACETAMINOPHEN 5; 325 MG/1; MG/1
1 TABLET ORAL EVERY 6 HOURS PRN
Status: DISCONTINUED | OUTPATIENT
Start: 2018-10-08 | End: 2018-10-09 | Stop reason: HOSPADM

## 2018-10-08 RX ORDER — ALLOPURINOL 100 MG/1
100 TABLET ORAL 2 TIMES DAILY
COMMUNITY
End: 2019-05-31 | Stop reason: SDUPTHER

## 2018-10-08 RX ORDER — DIPHENHYDRAMINE HYDROCHLORIDE 50 MG/ML
25 INJECTION INTRAMUSCULAR; INTRAVENOUS ONCE
Status: COMPLETED | OUTPATIENT
Start: 2018-10-08 | End: 2018-10-08

## 2018-10-08 RX ORDER — VARENICLINE TARTRATE 1 MG/1
1 TABLET, FILM COATED ORAL 2 TIMES DAILY
Status: DISCONTINUED | OUTPATIENT
Start: 2018-10-08 | End: 2018-10-09 | Stop reason: HOSPADM

## 2018-10-08 RX ORDER — LABETALOL HYDROCHLORIDE 5 MG/ML
10 INJECTION, SOLUTION INTRAVENOUS ONCE
Status: COMPLETED | OUTPATIENT
Start: 2018-10-08 | End: 2018-10-08

## 2018-10-08 RX ORDER — EPINEPHRINE 1 MG/ML
0.3 INJECTION, SOLUTION, CONCENTRATE INTRAVENOUS ONCE
Status: COMPLETED | OUTPATIENT
Start: 2018-10-08 | End: 2018-10-08

## 2018-10-08 RX ORDER — NICOTINE 21 MG/24HR
1 PATCH, TRANSDERMAL 24 HOURS TRANSDERMAL DAILY
Status: DISCONTINUED | OUTPATIENT
Start: 2018-10-09 | End: 2018-10-08

## 2018-10-08 RX ADMIN — METHYLPREDNISOLONE SODIUM SUCCINATE 125 MG: 125 INJECTION, POWDER, FOR SOLUTION INTRAMUSCULAR; INTRAVENOUS at 13:11

## 2018-10-08 RX ADMIN — SODIUM CHLORIDE 1000 ML: 0.9 INJECTION, SOLUTION INTRAVENOUS at 13:12

## 2018-10-08 RX ADMIN — DIPHENHYDRAMINE HYDROCHLORIDE 25 MG: 50 INJECTION, SOLUTION INTRAMUSCULAR; INTRAVENOUS at 14:27

## 2018-10-08 RX ADMIN — HYDROCODONE BITARTRATE AND ACETAMINOPHEN 1 TABLET: 5; 325 TABLET ORAL at 20:03

## 2018-10-08 RX ADMIN — ASPIRIN 81 MG 324 MG: 81 TABLET ORAL at 13:13

## 2018-10-08 RX ADMIN — DIPHENHYDRAMINE HYDROCHLORIDE 25 MG: 50 INJECTION, SOLUTION INTRAMUSCULAR; INTRAVENOUS at 13:12

## 2018-10-08 RX ADMIN — ALLOPURINOL 100 MG: 100 TABLET ORAL at 19:58

## 2018-10-08 RX ADMIN — LABETALOL 20 MG/4 ML (5 MG/ML) INTRAVENOUS SYRINGE 10 MG: at 13:40

## 2018-10-08 RX ADMIN — IOHEXOL 100 ML: 350 INJECTION, SOLUTION INTRAVENOUS at 13:36

## 2018-10-08 RX ADMIN — NITROGLYCERIN 0.4 MG: 0.4 TABLET SUBLINGUAL at 13:13

## 2018-10-08 RX ADMIN — MORPHINE SULFATE 4 MG: 4 INJECTION, SOLUTION INTRAMUSCULAR; INTRAVENOUS at 14:27

## 2018-10-08 RX ADMIN — FAMOTIDINE 20 MG: 10 INJECTION, SOLUTION INTRAVENOUS at 13:12

## 2018-10-08 RX ADMIN — EPINEPHRINE 0.3 MG: 1 INJECTION, SOLUTION, CONCENTRATE INTRAVENOUS at 13:07

## 2018-10-08 RX ADMIN — MORPHINE SULFATE 4 MG: 4 INJECTION, SOLUTION INTRAMUSCULAR; INTRAVENOUS at 13:55

## 2018-10-08 NOTE — ED NOTES
Capri Rivero states patient can eat, given crackers and ginger ale   Wife provided with same      Veryl Irons, RN  10/08/18 1501

## 2018-10-08 NOTE — ED NOTES
Patient returned from [address] [place] [address], [facility]  10/08/18 9087

## 2018-10-08 NOTE — H&P
H&P- Eliana Lazar 1950, 69 y o  male MRN: [id number]    Unit/Bed#: E4 -01 Encounter: [id number]    Primary Care Provider: Samy Huang MD   Date and time admitted to hospital: 10/8/2018  1:00 PM        * Chest pain   Assessment & Plan    Thought to be due to anaphylactic reaction to bee sting  Will rule out for MI  Check Echo  If all negative, I will send him home with an epi pen     Tobacco abuse   Assessment & Plan    Will place a nicotine patch       Aneurysm of ascending aorta (HCC)   Assessment & Plan    Stable on CT scan  No change from recent CT scan               Chief Complaint:   Bee sting to hand and then chest pain/sob    History of Present Illness:    Eliana Lazar is a 69 y o  male who presents with chest pain  Patient was stung by a bee this morning  He was stung in the left finger  It was very painful and he pulled the stinger out  He did not see the bee to identify it  He has been stung by bees before with no significant reaction  However a few minutes after the sting, pain started traveling up his left arm and into his anterior chest     He became short of breath and diaphoretic  He felt like he was having a heart attack, so he came to the ER  In the ER his blood pressure was noted to be systolically in the 961M  They did a CT chest which showed a stable ascending thoracic aortic aneuysm  He does feel better now after treatment for an suspected allergic reaction  Currently he is chest pain free      Review of Systems:    Review of Systems    Past Medical and Surgical History:     Past Medical History:   Diagnosis Date    Abnormal chest CT     RESOLVED: [id number]    Balance disorder     RESOLVED: [id number]    Lumbar radiculopathy     RESOLVED: [id number]    Lumbar spondylosis     RESOLVED: [id number]       Past Surgical History:   Procedure Laterality Date    CHOLECYSTECTOMY      TOTAL HIP ARTHROPLASTY Left 06/2016       Meds/Allergies:    Prior to Admission medications    Medication Sig Start Date End Date Taking? Authorizing Provider   Ascorbic Acid (VITAMIN C) 1000 MG tablet Take 1 tablet by mouth daily 11/20/17  Yes Historical Provider, MD   aspirin 81 MG tablet Take 1 tablet by mouth daily 6/15/16  Yes Historical Provider, MD   allopurinol (ZYLOPRIM) 100 mg tablet TAKE 1 TO 3 TABLETS BY MOUTH DAILY AS DIRECTED FOR GOUT 10/8/18   Ingrid Meraz MD   allopurinol (ZYLOPRIM) 100 mg tablet Take 100 mg by mouth 2 (two) times a day    Historical Provider, MD   Methylprednisolone 4 MG TBPK TAKE AS DIRECRED ON PACKGE  Patient not taking: Reported on 10/1/2018 5/31/18   Ingrid Meraz MD   varenicline (CHANTIX RAPHAEL) 0 5 MG X 11 & 1 MG X 42 tablet Take one 0 5mg tab by mouth 1x daily for 3 days, then increase to one 0 5mg tab 2x daily for 3 days, then increase to one 1mg tab 2x daily 10/1/18   Ingrid Meraz MD   varenicline (CHANTIX) 1 mg tablet Take 1 tablet (1 mg total) by mouth 2 (two) times a day 10/1/18   Ingrid Meraz MD     I have reviewed home medications with patient personally  Allergies: Allergies   Allergen Reactions    Atorvastatin Myalgia       Social History:         Substance Use History:   History   Alcohol Use    Yes     Comment: DAILY      History   Smoking Status    Current Every Day Smoker    Packs/day: 1 00   Smokeless Tobacco    Never Used     History   Drug use: Unknown       Family History:    non-contributory    Physical Exam:     Vitals:   Blood Pressure: (!) 174/83 (10/08/18 1554)  Pulse: 55 (10/08/18 1554)  Temperature: (!) 97 3 °F (36 3 °C) (10/08/18 1554)  Temp Source: Tympanic (10/08/18 1554)  Respirations: 12 (10/08/18 1554)  Height: 6' 4" (193 cm) (10/08/18 1554)  Weight - Scale: 111 kg (245 lb 2 4 oz) (10/08/18 1554)  SpO2: 94 % (10/08/18 1554)    Physical Exam   HENT:   Head: Normocephalic and atraumatic  Eyes: Pupils are equal, round, and reactive to light   EOM are normal  Cardiovascular: Normal rate and regular rhythm  Exam reveals no gallop and no friction rub  No murmur heard  Pulmonary/Chest: Effort normal and breath sounds normal  He has no wheezes  He has no rales  Abdominal: Soft  There is no tenderness  Musculoskeletal: He exhibits no edema  Nursing note and vitals reviewed  Additional Data:     Lab Results: I have personally reviewed pertinent reports  Results from last 7 days  Lab Units 10/08/18  1317 10/08/18  1308   WBC Thousand/uL  --  7 70   HEMOGLOBIN g/dL  --  17 2*   I STAT HEMOGLOBIN g/dl 17 3*  --    HEMATOCRIT % 51* 49 9*   PLATELETS Thousands/uL  --  176   NEUTROS ABS Thousands/µL  --  4 92   NEUTROS PCT %  --  64   LYMPHS PCT %  --  23   MONOS PCT %  --  10   EOS PCT %  --  2       Results from last 7 days  Lab Units 10/08/18  1317 10/08/18  1308   SODIUM mmol/L  --  138   POTASSIUM mmol/L  --  4 7   CHLORIDE mmol/L  --  103   CO2 mmol/L  --  24   BUN mg/dL  --  18   CREATININE mg/dL  --  0 92   ANION GAP ISTAT mmol/L 17*  --    ANION GAP mmol/L  --  11   CALCIUM mg/dL  --  9 9   ALBUMIN g/dL  --  3 9   TOTAL BILIRUBIN mg/dL  --  0 53   ALK PHOS U/L  --  88   ALT U/L  --  46   AST U/L  --  37   GLUCOSE, ISTAT mg/dl 95  --        Results from last 7 days  Lab Units 10/08/18  1308   INR  0 88                   Imaging: I have personally reviewed pertinent reports  CTA dissection protocol chest and abdomen   Final Result by Jamila Carranza MD (10/08 1440)      No evidence of aortic dissection  Stable 4 3 cm ascending thoracic aortic aneurysm  Workstation performed: [id number]             EKG, Pathology, and Other Studies Reviewed on Admission:   · EKG: NSR no ST changes    Allscripts / Epic Records Reviewed: Yes     ** Please Note: This note has been constructed using a voice recognition system  **    Review of systems was done on 10/8/2018, but did not get transcribed in note       A 12 point review of systems was performed and other than as per HPI, all other review of systems was negative

## 2018-10-08 NOTE — ED PROVIDER NOTES
History  Chief Complaint   Patient presents with    Allergic Reaction     Patient reports 30min PTA stung by bee in left third digit  Pain spread up arm and then patient began to experience sharp chest pain, shortness of breath, and became diaphoretic  NO known allergies   Chest Pain     Reports sharp left sided chest pain  hx of anneurisym  Pt  Got stung by a bee in his L 3rd finger about 30 min  Pta  He c/o L sided chest pain radiating up to his neck and back and diaphoresis  His bp was elevated to 208/109  In the ER  He doesn't take antihypertensives but said he was diagnosed with HTN in the past   Pt  Has a hx of an ascending aortic aneursym for which he had a routine CT chest done yest   He is not on blood thinners  No wheezing, no rash  Prior to Admission Medications   Prescriptions Last Dose Informant Patient Reported? Taking?    Ascorbic Acid (VITAMIN C) 1000 MG tablet 10/8/2018 at Unknown time  Yes Yes   Sig: Take 1 tablet by mouth daily   Methylprednisolone 4 MG TBPK   No No   Sig: TAKE AS DIRECRED ON PACKGE   Patient not taking: Reported on 10/1/2018   allopurinol (ZYLOPRIM) 100 mg tablet More than a month at Unknown time  Yes No   Sig: Take 100 mg by mouth 2 (two) times a day   allopurinol (ZYLOPRIM) 100 mg tablet   No No   Sig: TAKE 1 TO 3 TABLETS BY MOUTH DAILY AS DIRECTED FOR GOUT   aspirin 81 MG tablet Past Month at Unknown time  Yes Yes   Sig: Take 1 tablet by mouth daily   varenicline (CHANTIX RAPHAEL) 0 5 MG X 11 & 1 MG X 42 tablet Unknown at Unknown time  No No   Sig: Take one 0 5mg tab by mouth 1x daily for 3 days, then increase to one 0 5mg tab 2x daily for 3 days, then increase to one 1mg tab 2x daily   varenicline (CHANTIX) 1 mg tablet Unknown at Unknown time  No No   Sig: Take 1 tablet (1 mg total) by mouth 2 (two) times a day      Facility-Administered Medications: None       Past Medical History:   Diagnosis Date    Abnormal chest CT     RESOLVED: [id number]    Balance disorder     RESOLVED: [id number]    Lumbar radiculopathy     RESOLVED: [id number]    Lumbar spondylosis     RESOLVED: [id number]       Past Surgical History:   Procedure Laterality Date    CHOLECYSTECTOMY      TOTAL HIP ARTHROPLASTY Left 06/2016       Family History   Problem Relation Age of Onset    Hypertension Father     Arthritis Family     Colon polyps Family      I have reviewed and agree with the history as documented  Social History   Substance Use Topics    Smoking status: Current Every Day Smoker     Packs/day: 1 00    Smokeless tobacco: Never Used    Alcohol use Yes      Comment: DAILY         Review of Systems   Constitutional: Positive for diaphoresis  Negative for appetite change, fatigue and fever  HENT: Negative for rhinorrhea and sore throat  Respiratory: Negative for cough, shortness of breath and wheezing  Cardiovascular: Positive for chest pain  Negative for leg swelling  Gastrointestinal: Negative for abdominal pain, diarrhea and vomiting  Genitourinary: Negative for dysuria and flank pain  Musculoskeletal: Negative for back pain and neck pain  Skin: Negative for rash  Neurological: Negative for syncope and headaches  Psychiatric/Behavioral:        Mood normal       Physical Exam  Physical Exam   Constitutional: He is oriented to person, place, and time  He appears well-developed and well-nourished  HENT:   Head: Normocephalic and atraumatic  Mouth/Throat: Oropharynx is clear and moist    No facial swelling   Neck: Normal range of motion  Neck supple  Cardiovascular: Normal rate and regular rhythm  Pulmonary/Chest: Effort normal and breath sounds normal  No respiratory distress  He has no wheezes  Abdominal: Soft  There is no tenderness  Musculoskeletal: Normal range of motion  He exhibits no edema  Neurological: He is alert and oriented to person, place, and time  Skin: Skin is warm and dry  Nursing note and vitals reviewed        Vital Signs  ED Triage Vitals [10/08/18 1306]   Temperature Pulse Respirations Blood Pressure SpO2   98 °F (36 7 °C) 69 (!) 26 (!) 256/136 98 %      Temp Source Heart Rate Source Patient Position - Orthostatic VS BP Location FiO2 (%)   Temporal Monitor Lying Right arm --      Pain Score       Worst Possible Pain           Vitals:    10/08/18 1554 10/08/18 1839 10/08/18 2300 10/09/18 0733   BP: (!) 174/83 170/83 156/77 140/71   Pulse: 55 67 63 (!) 54   Patient Position - Orthostatic VS:  Lying Lying Lying       Visual Acuity      ED Medications  Medications   sodium chloride 0 9 % bolus 1,000 mL (0 mL Intravenous Stopped 10/8/18 1427)   diphenhydrAMINE (BENADRYL) injection 25 mg (25 mg Intravenous Given 10/8/18 1312)   methylPREDNISolone sodium succinate (Solu-MEDROL) injection 125 mg (125 mg Intravenous Given 10/8/18 1311)   EPINEPHrine PF (ADRENALIN) 1 mg/mL injection 0 3 mg (0 3 mg Subcutaneous Given 10/8/18 1307)   famotidine (PEPCID) injection 20 mg (20 mg Intravenous Given 10/8/18 1312)   aspirin chewable tablet 324 mg (324 mg Oral Given 10/8/18 1313)   nitroglycerin (NITROSTAT) SL tablet 0 4 mg (0 4 mg Sublingual Given 10/8/18 1313)   iohexol (OMNIPAQUE) 350 MG/ML injection (MULTI-DOSE) 100 mL (100 mL Intravenous Given 10/8/18 1336)   labetalol (NORMODYNE) injection 10 mg (10 mg Intravenous Given 10/8/18 1340)   morphine (PF) 4 mg/mL injection 4 mg (4 mg Intravenous Given 10/8/18 1355)   morphine (PF) 4 mg/mL injection 4 mg (4 mg Intravenous Given 10/8/18 1427)   diphenhydrAMINE (BENADRYL) injection 25 mg (25 mg Intravenous Given 10/8/18 1427)       Diagnostic Studies  Results Reviewed     Procedure Component Value Units Date/Time    B-type natriuretic peptide [[id number]]  (Abnormal) Collected:  10/08/18 1308    Lab Status:  Final result Specimen:  Blood from Arm, Left Updated:  10/08/18 1458     NT-proBNP 133 (H) pg/mL     Comprehensive metabolic panel [[id number]] Collected:  10/08/18 1308    Lab Status:  Final result Specimen:  Blood from Arm, Left Updated:  10/08/18 1454     Sodium 138 mmol/L      Potassium 4 7 mmol/L      Chloride 103 mmol/L      CO2 24 mmol/L      ANION GAP 11 mmol/L      BUN 18 mg/dL      Creatinine 0 92 mg/dL      Glucose 104 mg/dL      Calcium 9 9 mg/dL      AST 37 U/L      ALT 46 U/L      Alkaline Phosphatase 88 U/L      Total Protein 7 7 g/dL      Albumin 3 9 g/dL      Total Bilirubin 0 53 mg/dL      eGFR 85 ml/min/1 73sq m     Narrative:         National Kidney Disease Education Program recommendations are as follows:  GFR calculation is accurate only with a steady state creatinine  Chronic Kidney disease less than 60 ml/min/1 73 sq  meters  Kidney failure less than 15 ml/min/1 73 sq  meters      Troponin I [[id number]]  (Normal) Collected:  10/08/18 1308    Lab Status:  Final result Specimen:  Blood from Arm, Left Updated:  10/08/18 1338     Troponin I <0 02 ng/mL     POCT Chem 8+ [[id number]]  (Abnormal) Collected:  10/08/18 1317    Lab Status:  Final result Updated:  10/08/18 1338     SODIUM, I-STAT 140 mmol/l      Potassium, i-STAT 4 2 mmol/L      Chloride, istat 102 mmol/L      CO2, i-STAT 26 mmol/L      Anion Gap, Istat 17 (H) mmol/L      Calcium, Ionized i-STAT 1 21 mmol/L      BUN, I-STAT 19 mg/dl      Creatinine, i-STAT 1 0 mg/dl      eGFR 76 ml/min/1 73sq m      Glucose, i-STAT 95 mg/dl      Hct, i-STAT 51 (H) %      Hgb, i-STAT 17 3 (H) g/dl      Specimen Type VENOUS    APTT [[id number]]  (Abnormal) Collected:  10/08/18 1308    Lab Status:  Final result Specimen:  Blood from Arm, Left Updated:  10/08/18 1333     PTT 23 (L) seconds     Protime-INR [[id number]]  (Normal) Collected:  10/08/18 1308    Lab Status:  Final result Specimen:  Blood from Arm, Left Updated:  10/08/18 1333     Protime 12 0 seconds      INR 0 88    CBC and differential [[id number]]  (Abnormal) Collected:  10/08/18 1308    Lab Status:  Final result Specimen:  Blood from Arm, Left Updated:  10/08/18 1315     WBC 7 70 Thousand/uL      RBC 5 41 Million/uL      Hemoglobin 17 2 (H) g/dL      Hematocrit 49 9 (H) %      MCV 92 fL      MCH 31 8 pg      MCHC 34 5 g/dL      RDW 12 2 %      MPV 9 7 fL      Platelets 061 Thousands/uL      nRBC 0 /100 WBCs      Neutrophils Relative 64 %      Immat GRANS % 0 %      Lymphocytes Relative 23 %      Monocytes Relative 10 %      Eosinophils Relative 2 %      Basophils Relative 1 %      Neutrophils Absolute 4 92 Thousands/µL      Immature Grans Absolute 0 02 Thousand/uL      Lymphocytes Absolute 1 80 Thousands/µL      Monocytes Absolute 0 73 Thousand/µL      Eosinophils Absolute 0 14 Thousand/µL      Basophils Absolute 0 09 Thousands/µL                  CTA dissection protocol chest and abdomen   Final Result by Lucas Davidson MD (10/08 1440)      No evidence of aortic dissection  Stable 4 3 cm ascending thoracic aortic aneurysm  Workstation performed: [id number]                    Procedures  ECG 12 Lead Documentation  Date/Time: 10/8/2018 1:05 PM  Performed by: MARY Queen  Authorized by: MARY Queen     Rate:     ECG rate:  68    ECG rate assessment: normal    Rhythm:     Rhythm: sinus rhythm    ST segments:     ST segments:  Non-specific           Phone Contacts  ED Phone Contact    ED Course                               MDM  Number of Diagnoses or Management Options  Allergic reaction, initial encounter:   Chest pain:      Amount and/or Complexity of Data Reviewed  Clinical lab tests: ordered and reviewed  Tests in the radiology section of CPT®: ordered and reviewed    Risk of Complications, Morbidity, and/or Mortality  Presenting problems: moderate  General comments: Pt   Admitted for further workup      CritCare Time    Disposition  Final diagnoses:   Chest pain   Allergic reaction, initial encounter     Time reflects when diagnosis was documented in both MDM as applicable and the Disposition within this note     Time User Action Codes Description Comment    10/8/2018  3:16 PM Marilyscshantanu Keynce R Add [R07 9] Chest pain     10/8/2018  3:16 PM Viv Juarez R Add [T78 40XA] Allergic reaction, initial encounter     10/9/2018 11:12 AM Yumiko Choi [address]  2XXD] Anaphylaxis, subsequent encounter       ED Disposition     ED Disposition Condition Comment    Admit  Case was discussed with KIM and the patient's admission status was agreed to be obs /tele      Follow-up Information     Follow up With Specialties Details Why Jennifer Hawk MD Family Medicine Follow up  [address]  [address] [facility]  [phone]      Allergy/Immunology  Follow up Follow up with the practice of your choice           Discharge Medication List as of 10/9/2018 11:49 AM      START taking these medications    Details   EPINEPHrine (EPIPEN) 0 3 mg/0 3 mL SOAJ Inject 0 3 mL (0 3 mg total) into a muscle once for 1 dose As needed for allergic reaction, Starting Tue 10/9/2018, Normal         CONTINUE these medications which have CHANGED    Details   Methylprednisolone 4 MG TBPK Use as directed on package, Normal         CONTINUE these medications which have NOT CHANGED    Details   Ascorbic Acid (VITAMIN C) 1000 MG tablet Take 1 tablet by mouth daily, Starting Mon 11/20/2017, Historical Med      aspirin 81 MG tablet Take 1 tablet by mouth daily, Starting Wed 6/15/2016, Historical Med      !! allopurinol (ZYLOPRIM) 100 mg tablet Take 100 mg by mouth 2 (two) times a day, Historical Med      !! allopurinol (ZYLOPRIM) 100 mg tablet TAKE 1 TO 3 TABLETS BY MOUTH DAILY AS DIRECTED FOR GOUT, Normal      varenicline (CHANTIX) 1 mg tablet Take 1 tablet (1 mg total) by mouth 2 (two) times a day, Starting Mon 10/1/2018, Normal       !! - Potential duplicate medications found  Please discuss with provider        STOP taking these medications       varenicline (CHANTIX RAPHAEL) 0 5 MG X 11 & 1 MG X 42 tablet Comments:   Reason for Stopping: Outpatient Discharge Orders  Discharge Diet     Activity as tolerated         ED Provider  Electronically Signed by           Vasu Puckett MD  10/16/18 7825

## 2018-10-08 NOTE — ED NOTES
Provider at bedside, patient wife comes to nursing desk reporting patient has increasing pain in hand and chest       Jessi Cisneros RN  10/08/18 [address] [facility] Lindsey Garcias  10/08/18 0270

## 2018-10-08 NOTE — ED NOTES
Repeat EKG ran now, patient has intense chest pain from tips of left hand radiating up to neck through chest       Charlotte Khan RN  10/08/18 4263

## 2018-10-08 NOTE — ED NOTES
Patient transported to [address] [place], [facility] [place]  10/08/18 131

## 2018-10-08 NOTE — ED NOTES
Patient reports CP to be gone, pain is only present from elbow to finger tips        Jesus Anguiano RN  10/08/18 2165

## 2018-10-08 NOTE — ASSESSMENT & PLAN NOTE
Thought to be due to anaphylactic reaction to bee sting  Will rule out for MI  Check Echo  If all negative, I will send him home with an epi pen

## 2018-10-09 VITALS
DIASTOLIC BLOOD PRESSURE: 71 MMHG | HEIGHT: 76 IN | SYSTOLIC BLOOD PRESSURE: 140 MMHG | HEART RATE: 54 BPM | RESPIRATION RATE: 18 BRPM | TEMPERATURE: 97.8 F | WEIGHT: 245.15 LBS | BODY MASS INDEX: 29.85 KG/M2 | OXYGEN SATURATION: 92 %

## 2018-10-09 PROBLEM — T78.2XXA ANAPHYLAXIS: Status: ACTIVE | Noted: 2018-10-09

## 2018-10-09 PROCEDURE — 99217 PR OBSERVATION CARE DISCHARGE MANAGEMENT: CPT | Performed by: HOSPITALIST

## 2018-10-09 RX ORDER — METHYLPREDNISOLONE 4 MG/1
TABLET ORAL
Qty: 21 TABLET | Refills: 0 | Status: SHIPPED | OUTPATIENT
Start: 2018-10-09 | End: 2019-04-24 | Stop reason: SDUPTHER

## 2018-10-09 RX ORDER — EPINEPHRINE 0.3 MG/.3ML
0.3 INJECTION SUBCUTANEOUS ONCE
Qty: 0.3 ML | Refills: 0 | Status: SHIPPED | OUTPATIENT
Start: 2018-10-09 | End: 2019-10-25

## 2018-10-09 RX ADMIN — OXYCODONE HYDROCHLORIDE AND ACETAMINOPHEN 1000 MG: 500 TABLET ORAL at 08:25

## 2018-10-09 RX ADMIN — VARENICLINE TARTRATE 1 MG: 1 TABLET, FILM COATED ORAL at 08:26

## 2018-10-09 RX ADMIN — PREDNISONE 60 MG: 20 TABLET ORAL at 08:25

## 2018-10-09 RX ADMIN — ALLOPURINOL 100 MG: 100 TABLET ORAL at 08:25

## 2018-10-09 RX ADMIN — NICOTINE 21 MG: 21 PATCH, EXTENDED RELEASE TRANSDERMAL at 08:26

## 2018-10-09 RX ADMIN — ASPIRIN 81 MG 81 MG: 81 TABLET ORAL at 08:26

## 2018-10-09 NOTE — ASSESSMENT & PLAN NOTE
· Elevated on admission  · Now improved   · Normally controlled without medications per PCP office note

## 2018-10-09 NOTE — ASSESSMENT & PLAN NOTE
· Suspected secondary to insect bite resulting in chest pain, diaphoresis, shortness of breath  · Improved with steroid  · Will discharge on Medrol dose pack  · Will prescribe an Epi pen  · Recommended outpatient allergy/immunology eval

## 2018-10-09 NOTE — DISCHARGE INSTRUCTIONS
Anaphylaxis   WHAT YOU NEED TO KNOW:   Anaphylaxis is a life-threatening allergic reaction that must be treated immediately  Your risk for anaphylaxis increases if you have asthma that is severe or not controlled  Medical conditions such as heart disease can also increase your risk  It is important to be prepared if you are at risk for anaphylaxis  Your symptoms can be worse each time you are exposed to the trigger  DISCHARGE INSTRUCTIONS:   Steps to take for signs or symptoms of anaphylaxis:   · Immediately  give 1 shot of epinephrine only into the outer thigh muscle  · Leave the shot in place  as directed  Your healthcare provider may recommend you leave it in place for up to 10 seconds before you remove it  This helps make sure all of the epinephrine is delivered  · Call 911 and go to the emergency department,  even if the shot improved symptoms  Do not drive yourself  Bring the used epinephrine shot with you  Call 911 for any of the following:   · You have a skin rash, hives, swelling, or itching  · You have trouble breathing, shortness of breath, wheezing, or coughing  · Your throat tightens or your lips or tongue swell  · You have difficulty swallowing or speaking  · You are dizzy, lightheaded, confused, or feel like you are going to faint  · You have nausea, diarrhea, or abdominal cramps, or you are vomiting  Seek care immediately if:   · Signs or symptoms of anaphylaxis return  Contact your healthcare provider if:   · You have questions or concerns about your condition or care  Medicines:   · Epinephrine  is used to treat severe allergic reactions such as anaphylaxis  · Medicines  such as antihistamines, steroids, and bronchodilators decrease inflammation, open airways, and make breathing easier  · Take your medicine as directed  Contact your healthcare provider if you think your medicine is not helping or if you have side effects   Tell him or her if you are allergic to any medicine  Keep a list of the medicines, vitamins, and herbs you take  Include the amounts, and when and why you take them  Bring the list or the pill bottles to follow-up visits  Carry your medicine list with you in case of an emergency  Follow up with your healthcare provider as directed: Allergy testing may reveal allergies that can trigger anaphylaxis  Write down your questions so you remember to ask them during your visits  Safety precautions:   · Keep 2 shots of epinephrine with you at all times  You may need a second shot, because epinephrine only works for about 20 minutes and symptoms may return  Your healthcare provider can show you and family members how to give the shot  Check the expiration date every month and replace it before it expires  · Create an action plan  Your healthcare provider can help you create a written plan that explains the allergy and an emergency plan to treat a reaction  The plan explains when to give a second epinephrine shot if symptoms return or do not improve after the first  Give copies of the action plan and emergency instructions to family members, work and school staff, and  providers  Show them how to give a shot of epinephrine  · Be careful when you exercise  If you have had exercise-induced anaphylaxis, do not exercise right after you eat  Stop exercising right away if you start to develop any signs or symptoms of anaphylaxis  You may first feel tired, warm, or have itchy skin  Hives, swelling, and severe breathing problems may develop if you continue to exercise  · Carry medical alert identification  Wear medical alert jewelry or carry a card that explains the allergy  Ask your healthcare provider where to get these items  · Identify and avoid known triggers  Read food labels for ingredients  Look for triggers in your environment  · Ask about treatments to prevent anaphylaxis    You may need allergy shots or other medicines to treat allergies  © 2017 [facility] Information is for End User's use only and may not be sold, redistributed or otherwise used for commercial purposes  All illustrations and images included in CareNotes® are the copyrighted property of A D A M , Inc  or Kade Saldaña  The above information is an  only  It is not intended as medical advice for individual conditions or treatments  Talk to your doctor, nurse or pharmacist before following any medical regimen to see if it is safe and effective for you

## 2018-10-09 NOTE — PROGRESS NOTES
Clarification      Patient is observatiion status  He is here with chest pain after a bee sting     His length of stay will be less than 2 midnights

## 2018-10-09 NOTE — UTILIZATION REVIEW
Initial Clinical Review    Admission: Date/Time/Statement:     OBS  ORDER   10/8   @  1516     Orders Placed This Encounter   Procedures    Place in Observation (expected length of stay for this patient is less than two midnights)     Standing Status:   Standing     Number of Occurrences:   1     Order Specific Question:   Admitting Physician     Answer:   Venus Cedillo [[id number]]     Order Specific Question:   Level of Care     Answer:   Med Surg [16]         ED: Date/Time/Mode of Arrival:   ED Arrival Information     Expected Arrival Acuity Means of Arrival Escorted By Service Admission Type    - 10/8/2018 12:56 Emergent Wheelchair Self General Medicine Emergency    Arrival Complaint    Chest pain          Chief Complaint:   Chief Complaint   Patient presents with    Allergic Reaction     Patient reports 30min PTA stung by bee in left third digit  Pain spread up arm and then patient began to experience sharp chest pain, shortness of breath, and became diaphoretic  NO known allergies   Chest Pain     Reports sharp left sided chest pain  hx of anneurisym  History of Illness: Jona Subramanian is a 69 y o  male who presents with chest pain  Patient was stung by a bee this morning  He was stung in the left finger  It was very painful and he pulled the stinger out  He did not see the bee to identify it  He has been stung by bees before with no significant reaction  However a few minutes after the sting, pain started traveling up his left arm and into his anterior chest     He became short of breath and diaphoretic  He felt like he was having a heart attack, so he came to the ER  In the ER his blood pressure was noted to be systolically in the 427K  They did a CT chest which showed a stable ascending thoracic aortic aneuysm  He does feel better now after treatment for an suspected allergic reaction     Currently he is chest pain free        ED Vital Signs:   ED Triage Vitals [10/08/18 1306]   Temperature Pulse Respirations Blood Pressure SpO2   98 °F (36 7 °C) 69 (!) 26 (!) 256/136 98 %      Temp Source Heart Rate Source Patient Position - Orthostatic VS BP Location FiO2 (%)   Temporal Monitor Lying Right arm --      Pain Score       Worst Possible Pain        Wt Readings from Last 1 Encounters:   10/08/18 111 kg (245 lb 2 4 oz)       Vital Signs (abnormal):    above    Abnormal Labs/Diagnostic Test Results:   H/H   17 2/49 9  Ct  Chest/abd;   No evidence of aortic dissection  Stable 4 3 cm ascending thoracic aortic aneurysm      ED Treatment:   Medication Administration from 10/08/2018 1256 to 10/08/2018 1539       Date/Time Order Dose Route Action Action by Comments     10/08/2018 1427 sodium chloride 0 9 % bolus 1,000 mL 0 mL [facility]venous [facility]      10/08/2018 1312 sodium chloride 0 9 % bolus 1,000 mL 1,000 mL Intravenous [facility] Saria[facility] AnoJF      10/08/2018 1312 diphenhydrAMINE (BENADRYL) injection 25 mg 25 mg Intravenous [facility]      10/08/2018 1311 methylPREDNISolone sodium succinate (Solu-MEDROL) injection 125 mg 125 mg Intravenous [facility]      10/08/2018 1307 EPINEPHrine PF (ADRENALIN) 1 mg/mL injection 0 3 mg 0 3 mg Subcutaneous [facility]      10/08/2018 1312 famotidine (PEPCID) injection 20 mg 20 mg Intravenous [facility]      10/08/2018 1313 aspirin chewable tablet 324 mg 324 mg Oral [facility]      10/08/2018 1313 nitroglycerin (NITROSTAT) SL tablet 0 4 mg 0 4 mg Sublingual [facility]      10/08/2018 1336 iohexol (OMNIPAQUE) 350 MG/ML injection (MULTI-DOSE) 100 mL 100 mL Intravenous Given Angelica Post      10/08/2018 1340 labetalol (NORMODYNE) injection 10 mg 10 mg Intravenous [facility]      10/08/2018 1355 morphine (PF) 4 mg/mL injection 4 mg 4 mg Intravenous [facility]      10/08/2018 1427 morphine (PF) 4 mg/mL injection 4 mg 4 mg Intravenous Given Elsi Gould RN      10/08/2018 1427 diphenhydrAMINE (BENADRYL) injection 25 mg 25 mg Intravenous Given Elsi Gould RN           Past Medical/Surgical History:    Active Ambulatory Problems     Diagnosis Date Noted    Alcohol consumption of more than two drinks per day 06/23/2016    Aneurysm of ascending aorta ([facility] [address] ) 10/02/2014    Aortic stenosis 09/20/2017    Cigarette nicotine dependence with nicotine-induced disorder 06/23/2016    Colonic polyp 09/20/2017    Idiopathic chronic gout of left ankle without tophus 09/26/2014    Heart murmur 09/26/2014    Pure hypercholesterolemia 12/10/2012    Essential hypertension 05/19/2014    [facility]    Motion sickness 07/07/2015    Osteoarthritis of left hip 06/23/2016    Rising PSA level 10/12/2017    History of left hip replacement 09/20/2017     Resolved Ambulatory Problems     Diagnosis Date Noted    No Resolved Ambulatory Problems     Past Medical History:   Diagnosis Date    Abnormal chest CT     Balance disorder     Lumbar radiculopathy     Lumbar spondylosis        Admitting Diagnosis: Chest pain [R07 9]  Allergic reaction, initial encounter [T78 40XA]    Age/Sex: 69 y o  male    Assessment/Plan:   Chest pain   Assessment & Plan     Thought to be due to anaphylactic reaction to bee sting  Will rule out for MI  Check Echo  If all negative, I will send him home with an epi pen      Tobacco abuse   Assessment & Plan     Will place a nicotine patch         Aneurysm of ascending aorta (HCC)   Assessment & Plan     Stable on CT scan  No change from recent CT scan         Admission Orders:    OBS  ORDER    10/8  @  1516  Scheduled Meds:   Current Facility-Administered Medications:  acetaminophen 650 mg Oral Q6H PRN Miguel Prechtel, DO   allopurinol 100 mg Oral BID Miguel Prechtel, DO   vitamin C 1,000 mg Oral Daily Miguel Prechtel, DO   aspirin 81 mg Oral Daily Miguel Prechtel, DO enoxaparin 40 mg Subcutaneous Daily Miguel Prechtel, DO   HYDROcodone-acetaminophen 1 tablet Oral Q6H PRN Miguel Prechtel, DO   nicotine 21 mg Transdermal Daily Miguel Prechtel, DO   predniSONE 60 mg Oral Daily Miguel Prechtel, DO   varenicline 1 mg Oral BID Miguel Prechtel, DO     Continuous Infusions:    PRN Meds:   acetaminophen    HYDROcodone-acetaminophen     Tele  Cardiac  Diet  Serial troponin

## 2018-10-09 NOTE — DISCHARGE SUMMARY
Discharge- Ebony Velazquez 1950, 69 y o  male MRN: [id number]    Unit/Bed#: E4 -01 Encounter: [id number]    Primary Care Provider: Randolph De Leon MD   Date and time admitted to hospital: 10/8/2018  1:00 PM        * Anaphylaxis   Assessment & Plan    · Suspected secondary to insect bite resulting in chest pain, diaphoresis, shortness of breath  · Improved with steroid  · Will discharge on Medrol dose pack  · Will prescribe an Epi pen  · Recommended outpatient allergy/immunology eval     Chest pain   Assessment & Plan    Thought to be due to anaphylactic reaction to insect bite and accelerated HTN  Resolved  Troponins negative     Essential hypertension   Assessment & Plan    · Elevated on admission  · Now improved   · Normally controlled without medications per PCP office note     Tobacco abuse   Assessment & Plan    Currently using Chantix       Aneurysm of ascending aorta (HCC)   Assessment & Plan    Stable on CT scan  No change from recent CT scan         Discharging Physician / Practitioner: Tomy García PA-C  PCP: Randolph De Leon MD  Admission Date:   Admission Orders     Ordered        10/08/18 1516  Place in Observation (expected length of stay for this patient is less than two midnights)  Once             Discharge Date: 10/09/18    Resolved Problems  Date Reviewed: 10/9/2018    None          Consultations During Hospital Stay:  · none    Procedures Performed:     CTA C/A/P - No evidence of aortic dissection  Stable 4 3 cm ascending thoracic aortic aneurysm    Significant Findings / Test Results:     · See above    Incidental Findings:   · none     Test Results Pending at Discharge (will require follow up):   · none     Outpatient Tests Requested:  · Recommended allergy testing    Complications:  none    Reason for Admission: Chest pain    Hospital Course:     Ebony Velazquez is a 69 y o  male patient who originally presented to the hospital on 10/8/2018 due to chest pain   Patient Patient Discharge Instructions    Luciano Sotomayor / [id number] : 1953    Admitted 2017 Discharged: 2017     Take Home Medications     Resume home medications    Get bloodwork this Friday or next Monday to check on your kidneys       · It is important that you take the medication exactly as they are prescribed. · Keep your medication in the bottles provided by the pharmacist and keep a list of the medication names, dosages, and times to be taken in your wallet. · Do not take other medications without consulting your doctor. BRING ALL OF YOUR MEDICINES TO YOUR OFFICE VISIT with Drs. Will arrange cardiac surgical evaluation with [facility] Dr Shayne Winn for mitral valve repair/replacement and single vessel bypass      Cardiac Catheterization  Discharge Instructions     Do not drive, operate any machinery, or sign any legal documents for 24 hours after your procedure. You must have someone to drive you home.  You may take a shower 24 hours after your cardiac catheterization. Be sure to get the dressing wet and then remove it; gently wash the area with warm soapy water. Pat dry and leave open to air. To help prevent infections, be sure to keep the cath site clean and dry. No lotions, creams, powders, ointments, etc. in the cath site for approximately 1 week.  Do not take a tub bath, get in a hot tub or swimming pool for approximately 5 days or until the cath site is completely healed.  No strenuous activity or heavy lifting over 10 lbs. for 7 days.  Drink plenty of fluids for 24-48 hours after your cath to flush the contrast dye from your kidneys. No alcoholic beverages for 24 hours. You may resume your previous diet (low fat, low cholesterol) after your cath.  After your cath, some bruising or discomfort is common during the healing process. Tylenol, 1-2 tablets every 6 hours as needed, is recommended if you experience any discomfort.   If you experience any signs or symptoms of infection such as fever, chills, or poorly healing incision, persistent tenderness or swelling in the groin, redness and/or warmth to the touch, numbness, significant tingling or pain at the groin site or affected extremity, rash, drainage from the cath site, or if the leg feels tight or swollen, call your physician right away.  If bleeding at the cath site occurs, take a clean gauze pad and apply direct pressure to the groin just above the puncture site. Call 911 immediately, and continue to apply direct pressure until an ambulance gets to your location.  You may return to work  2  days after your cardiac cath if no groin bleeding. Information obtained by :  I understand that if any problems occur once I am at home I am to contact my physician. I understand and acknowledge receipt of the instructions indicated above.                                                                                                                                            R.N.'s Signature                                                                  Date/Time                                                                                                                                              Patient or Representative Juan David Gray MD states he was bit in the finger by an unknown insect at first thought to be a bee  He then developed arm pain, chest pain, diaphoresis and shortness of breath  He came to the ED and his blood pressure was in the 200's  Patient was treated with steroids and epinephrine with improvement of his symptoms  He was observed overnight without further events  See above for further details  Please see above list of diagnoses and related plan for additional information  Condition at Discharge: good     Discharge Day Visit / Exam:     Subjective:  Offers no complaints  No further chest pain  Left hand pain resolved  Vitals: Blood Pressure: 140/71 (10/09/18 0733)  Pulse: (!) 54 (10/09/18 0733)  Temperature: 97 8 °F (36 6 °C) (10/09/18 0733)  Temp Source: Tympanic (10/09/18 0733)  Respirations: 18 (10/09/18 0733)  Height: 6' 4" (193 cm) (10/08/18 1554)  Weight - Scale: 111 kg (245 lb 2 4 oz) (10/08/18 1554)  SpO2: 92 % (10/09/18 0733)  Exam:   Physical Exam   Constitutional: He is oriented to person, place, and time  He appears well-developed  No distress  HENT:   Head: Normocephalic and atraumatic  Neck: Normal range of motion  Neck supple  Cardiovascular: Normal rate and regular rhythm  No murmur heard  Pulmonary/Chest: Effort normal and breath sounds normal  No respiratory distress  He has no wheezes  He has no rales  Abdominal: Soft  Bowel sounds are normal  He exhibits no distension  Musculoskeletal: He exhibits no edema  Neurological: He is alert and oriented to person, place, and time  No cranial nerve deficit  Skin: Skin is warm and dry  No rash noted  Psychiatric: He has a normal mood and affect  Discussion with Family: Wife updated at bedside    Discharge instructions/Information to patient and family:   See after visit summary for information provided to patient and family        Provisions for Follow-Up Care:  See after visit summary for information related to follow-up care and any pertinent home health orders  Disposition:     Home    For Discharges to [facility]:   · Not Applicable to this Patient - Not Applicable to this Patient    Planned Readmission: none     Discharge Statement:  I spent 40 minutes discharging the patient  This time was spent on the day of discharge  I had direct contact with the patient on the day of discharge  Greater than 50% of the total time was spent examining patient, answering all patient questions, arranging and discussing plan of care with patient as well as directly providing post-discharge instructions  Additional time then spent on discharge activities  Discharge Medications:  See after visit summary for reconciled discharge medications provided to patient and family        ** Please Note: This note has been constructed using a voice recognition system **

## 2018-10-09 NOTE — ASSESSMENT & PLAN NOTE
Thought to be due to anaphylactic reaction to insect bite and accelerated HTN  Resolved  Troponins negative

## 2018-10-18 DIAGNOSIS — I71.2 THORACIC AORTIC ANEURYSM WITHOUT RUPTURE (HCC): ICD-10-CM

## 2019-03-08 RX ORDER — SCOLOPAMINE TRANSDERMAL SYSTEM 1 MG/1
1 PATCH, EXTENDED RELEASE TRANSDERMAL
Qty: 10 PATCH | Refills: 0 | Status: CANCELLED | OUTPATIENT
Start: 2019-03-08

## 2019-04-24 DIAGNOSIS — T78.2XXD ANAPHYLAXIS, SUBSEQUENT ENCOUNTER: ICD-10-CM

## 2019-04-24 RX ORDER — METHYLPREDNISOLONE 4 MG/1
TABLET ORAL
Qty: 21 TABLET | Refills: 0 | Status: SHIPPED | OUTPATIENT
Start: 2019-04-24 | End: 2019-08-23

## 2019-05-31 ENCOUNTER — OFFICE VISIT (OUTPATIENT)
Dept: FAMILY MEDICINE CLINIC | Facility: CLINIC | Age: 69
End: 2019-05-31
Payer: MEDICARE

## 2019-05-31 VITALS
HEART RATE: 64 BPM | BODY MASS INDEX: 30.8 KG/M2 | OXYGEN SATURATION: 98 % | RESPIRATION RATE: 18 BRPM | HEIGHT: 74 IN | SYSTOLIC BLOOD PRESSURE: 122 MMHG | WEIGHT: 240 LBS | DIASTOLIC BLOOD PRESSURE: 80 MMHG

## 2019-05-31 DIAGNOSIS — E78.00 PURE HYPERCHOLESTEROLEMIA: ICD-10-CM

## 2019-05-31 DIAGNOSIS — R97.20 RISING PSA LEVEL: ICD-10-CM

## 2019-05-31 DIAGNOSIS — I10 ESSENTIAL HYPERTENSION: ICD-10-CM

## 2019-05-31 DIAGNOSIS — Z78.9 ALCOHOL CONSUMPTION OF MORE THAN TWO DRINKS PER DAY: ICD-10-CM

## 2019-05-31 DIAGNOSIS — E66.9 OBESITY, CLASS I, BMI 30-34.9: ICD-10-CM

## 2019-05-31 DIAGNOSIS — Z72.0 TOBACCO ABUSE: ICD-10-CM

## 2019-05-31 DIAGNOSIS — C43.9 MALIGNANT MELANOMA OF SKIN (HCC): ICD-10-CM

## 2019-05-31 DIAGNOSIS — M1A.0720 IDIOPATHIC CHRONIC GOUT OF LEFT ANKLE WITHOUT TOPHUS: ICD-10-CM

## 2019-05-31 DIAGNOSIS — M75.51 SUBACROMIAL BURSITIS OF RIGHT SHOULDER JOINT: ICD-10-CM

## 2019-05-31 DIAGNOSIS — Z00.00 MEDICARE ANNUAL WELLNESS VISIT, SUBSEQUENT: Primary | ICD-10-CM

## 2019-05-31 DIAGNOSIS — R01.1 HEART MURMUR: ICD-10-CM

## 2019-05-31 DIAGNOSIS — K63.5 POLYP OF COLON, UNSPECIFIED PART OF COLON, UNSPECIFIED TYPE: ICD-10-CM

## 2019-05-31 DIAGNOSIS — F17.219 CIGARETTE NICOTINE DEPENDENCE WITH NICOTINE-INDUCED DISORDER: ICD-10-CM

## 2019-05-31 DIAGNOSIS — I71.2 THORACIC AORTIC ANEURYSM WITHOUT RUPTURE (HCC): ICD-10-CM

## 2019-05-31 DIAGNOSIS — I71.2 ANEURYSM OF ASCENDING AORTA (HCC): ICD-10-CM

## 2019-05-31 PROBLEM — I35.0 AORTIC STENOSIS: Status: RESOLVED | Noted: 2017-09-20 | Resolved: 2019-05-31

## 2019-05-31 PROBLEM — R07.9 CHEST PAIN: Status: RESOLVED | Noted: 2018-10-08 | Resolved: 2019-05-31

## 2019-05-31 PROCEDURE — 99214 OFFICE O/P EST MOD 30 MIN: CPT | Performed by: FAMILY MEDICINE

## 2019-05-31 PROCEDURE — G0439 PPPS, SUBSEQ VISIT: HCPCS | Performed by: FAMILY MEDICINE

## 2019-05-31 PROCEDURE — 20610 DRAIN/INJ JOINT/BURSA W/O US: CPT | Performed by: FAMILY MEDICINE

## 2019-05-31 RX ORDER — TRIAMCINOLONE ACETONIDE 40 MG/ML
20 INJECTION, SUSPENSION INTRA-ARTICULAR; INTRAMUSCULAR
Status: COMPLETED | OUTPATIENT
Start: 2019-05-31 | End: 2019-05-31

## 2019-05-31 RX ORDER — LIDOCAINE HYDROCHLORIDE 10 MG/ML
1 INJECTION, SOLUTION INFILTRATION; PERINEURAL
Status: COMPLETED | OUTPATIENT
Start: 2019-05-31 | End: 2019-05-31

## 2019-05-31 RX ADMIN — TRIAMCINOLONE ACETONIDE 20 MG: 40 INJECTION, SUSPENSION INTRA-ARTICULAR; INTRAMUSCULAR at 11:43

## 2019-05-31 RX ADMIN — LIDOCAINE HYDROCHLORIDE 1 ML: 10 INJECTION, SOLUTION INFILTRATION; PERINEURAL at 11:43

## 2019-08-22 ENCOUNTER — APPOINTMENT (EMERGENCY)
Dept: RADIOLOGY | Facility: HOSPITAL | Age: 69
End: 2019-08-22
Payer: MEDICARE

## 2019-08-22 ENCOUNTER — TELEPHONE (OUTPATIENT)
Dept: OBGYN CLINIC | Facility: HOSPITAL | Age: 69
End: 2019-08-22

## 2019-08-22 ENCOUNTER — HOSPITAL ENCOUNTER (EMERGENCY)
Facility: HOSPITAL | Age: 69
Discharge: HOME | End: 2019-08-22
Attending: EMERGENCY MEDICINE | Admitting: EMERGENCY MEDICINE
Payer: MEDICARE

## 2019-08-22 VITALS
OXYGEN SATURATION: 98 % | WEIGHT: 242.95 LBS | TEMPERATURE: 97.8 F | DIASTOLIC BLOOD PRESSURE: 95 MMHG | BODY MASS INDEX: 31.19 KG/M2 | HEART RATE: 74 BPM | RESPIRATION RATE: 16 BRPM | SYSTOLIC BLOOD PRESSURE: 190 MMHG

## 2019-08-22 DIAGNOSIS — S61.216A LACERATION OF RIGHT LITTLE FINGER: Primary | ICD-10-CM

## 2019-08-22 DIAGNOSIS — S61.214A LACERATION OF RIGHT RING FINGER: ICD-10-CM

## 2019-08-22 PROCEDURE — 73130 X-RAY EXAM OF HAND: CPT

## 2019-08-22 PROCEDURE — 99283 EMERGENCY DEPT VISIT LOW MDM: CPT

## 2019-08-22 PROCEDURE — 12002 RPR S/N/AX/GEN/TRNK2.6-7.5CM: CPT | Performed by: EMERGENCY MEDICINE

## 2019-08-22 PROCEDURE — 99285 EMERGENCY DEPT VISIT HI MDM: CPT | Performed by: EMERGENCY MEDICINE

## 2019-08-22 RX ORDER — CEPHALEXIN 500 MG/1
500 CAPSULE ORAL 2 TIMES DAILY
Qty: 14 CAPSULE | Refills: 0 | Status: SHIPPED | OUTPATIENT
Start: 2019-08-22 | End: 2019-08-29

## 2019-08-22 RX ORDER — LIDOCAINE HYDROCHLORIDE AND EPINEPHRINE 10; 10 MG/ML; UG/ML
20 INJECTION, SOLUTION INFILTRATION; PERINEURAL ONCE
Status: COMPLETED | OUTPATIENT
Start: 2019-08-22 | End: 2019-08-22

## 2019-08-22 RX ADMIN — LIDOCAINE HYDROCHLORIDE,EPINEPHRINE BITARTRATE 20 ML: 10; .01 INJECTION, SOLUTION INFILTRATION; PERINEURAL at 11:34

## 2019-08-22 NOTE — ED PROVIDER NOTES
History  Chief Complaint   Patient presents with    Finger Laceration     pt reports cleaning fish and sliced right 4th and 5th digits  pt arrives holding pressure  thinks tetanus shot is UTD  78 yo right handed male cleaning trout he had caught fly fishing in a pond, accidentally sliced his right little finger and right ring finger slipping off the fish  He is concerned because the pond is very stagnant and the fish is slimy  History provided by:  Patient  Finger Laceration   Location:  Finger  Finger laceration location:  R little finger and R ring finger  Length:  2 cm right ring finger, 2cm right little finger  Depth: Through underlying tissue  Quality: straight    Bleeding: venous and controlled with pressure    Time since incident:  1 hour  Laceration mechanism:  Knife  Pain details:     Quality:  Sharp and throbbing    Severity:  Severe    Timing:  Constant  Tetanus status:  Up to date  Associated symptoms: no fever and no rash        Prior to Admission Medications   Prescriptions Last Dose Informant Patient Reported? Taking?    Ascorbic Acid (VITAMIN C) 1000 MG tablet 8/21/2019 at Unknown time Self Yes Yes   Sig: Take 1 tablet by mouth daily   EPINEPHrine (EPIPEN) 0 3 mg/0 3 mL SOAJ More than a month at Unknown time Self No No   Sig: Inject 0 3 mL (0 3 mg total) into a muscle once for 1 dose As needed for allergic reaction   allopurinol (ZYLOPRIM) 100 mg tablet 8/21/2019 at Unknown time Self No Yes   Sig: TAKE 1 TO 3 TABLETS BY MOUTH DAILY AS DIRECTED FOR GOUT   Patient taking differently: TAKE 1 TABLETS BY MOUTH DAILY AS DIRECTED FOR GOUT   methylPREDNISolone 4 MG tablet therapy pack Not Taking at Unknown time Self No No   Sig: Use as directed on package   Patient not taking: Reported on 5/31/2019      Facility-Administered Medications: None       Past Medical History:   Diagnosis Date    Abnormal chest CT     RESOLVED: [id number]    Balance disorder     RESOLVED: [id number]    Lumbar radiculopathy     RESOLVED: [id number]    Lumbar spondylosis     RESOLVED: [id number]       Past Surgical History:   Procedure Laterality Date    CHOLECYSTECTOMY      TOTAL HIP ARTHROPLASTY Left 06/2016       Family History   Problem Relation Age of Onset    Hypertension Father     Arthritis Family     Colon polyps Family      I have reviewed and agree with the history as documented  Social History     Tobacco Use    Smoking status: Current Every Day Smoker     Packs/day: 1 00    Smokeless tobacco: Never Used   Substance Use Topics    Alcohol use: Yes     Comment: DAILY     Drug use: Not on file        Review of Systems   Constitutional: Negative for appetite change, chills and fever  HENT: Negative for sore throat  Respiratory: Negative for cough, shortness of breath and wheezing  Cardiovascular: Negative for chest pain and palpitations  Gastrointestinal: Negative for abdominal pain, diarrhea, nausea and vomiting  Genitourinary: Negative for dysuria and hematuria  Musculoskeletal: Negative for neck pain  Skin: Negative for rash  Neurological: Negative for dizziness, weakness and headaches  Psychiatric/Behavioral: Negative for suicidal ideas  All other systems reviewed and are negative  Physical Exam  Physical Exam   Constitutional: He is oriented to person, place, and time  He appears well-developed and well-nourished  HENT:   Head: Normocephalic and atraumatic  Right Ear: External ear normal    Left Ear: External ear normal    Nose: Nose normal    Mouth/Throat: Oropharynx is clear and moist    Eyes: Pupils are equal, round, and reactive to light  Conjunctivae and EOM are normal    Neck: Normal range of motion  Neck supple  Cardiovascular: Normal rate  Pulmonary/Chest: Effort normal    Abdominal: There is no tenderness  Musculoskeletal: Normal range of motion  Hands:  Neurological: He is alert and oriented to person, place, and time  No cranial nerve deficit  Coordination normal    Skin: Skin is warm and dry  Capillary refill takes less than 2 seconds  Psychiatric: He has a normal mood and affect  His behavior is normal  Judgment and thought content normal    Nursing note and vitals reviewed  Vital Signs  ED Triage Vitals   Temperature Pulse Respirations Blood Pressure SpO2   08/22/19 1045 08/22/19 1044 08/22/19 1044 08/22/19 1044 08/22/19 1044   97 8 °F (36 6 °C) 74 16 (!) 176/128 94 %      Temp Source Heart Rate Source Patient Position - Orthostatic VS BP Location FiO2 (%)   08/22/19 1045 08/22/19 1135 08/22/19 1135 08/22/19 1135 --   Temporal Monitor Sitting Left arm       Pain Score       08/22/19 1044       2           Vitals:    08/22/19 1044 08/22/19 1135 08/22/19 1354   BP: (!) 176/128 (!) 171/87 (!) 190/95   Pulse: 74 71 74   Patient Position - Orthostatic VS:  Sitting          Visual Acuity      ED Medications  Medications   lidocaine-epinephrine (XYLOCAINE/EPINEPHRINE) 1 %-1:100,000 injection 20 mL (20 mL Infiltration Given 8/22/19 1134)       Diagnostic Studies  Results Reviewed     None                 XR hand 3+ vw right    (Results Pending)              Procedures  Laceration repair  Date/Time: 8/22/2019 2:18 PM  Performed by: Isai Carrasquillo MD  Authorized by: Isai Carrasquillo MD   Consent: Verbal consent obtained    Patient understanding: patient states understanding of the procedure being performed  Body area: upper extremity  Location details: right small finger  Laceration length: 2 cm  Foreign bodies: no foreign bodies  Tendon involvement: complex  Anesthesia: digital block and local infiltration    Anesthesia:  Local Anesthetic: lidocaine 1% with epinephrine  Anesthetic total: 10 mL    Wound Dehiscence:  Superficial Wound Dehiscence: simple closure      Procedure Details:  Irrigation solution: saline  Amount of cleaning: extensive (scrubbed hand surface with surgical sponge to remove debrise, hand irrigated extensively)  Skin closure: 5-0 nylon  Number of sutures: 6  Technique: simple  Approximation: close  Approximation difficulty: simple  Patient tolerance: Patient tolerated the procedure well with no immediate complications  Comments: Tendon injury highly suspected with exam under anesthetic, cannot flex, I can see only frayed edge of tendon that does not cross the field through passive ROM  Right 4th/5th digits ryan splinted and dressed in position of flexion  Laceration repair  Date/Time: 8/22/2019 2:21 PM  Performed by: Norberto Beasley MD  Authorized by: Norberto Beasley MD   Consent: Verbal consent obtained  Body area: upper extremity  Location details: right ring finger  Laceration length: 2 cm  Foreign bodies: no foreign bodies  Tendon involvement: none (Exam under anesthesia, does not appear to comprise sheath)  Nerve involvement: none  Vascular damage: no  Anesthesia: local infiltration and digital block    Anesthesia:  Local Anesthetic: lidocaine 1% with epinephrine  Anesthetic total: 5 mL    Wound Dehiscence:  Superficial Wound Dehiscence: simple closure      Procedure Details:  Irrigation solution: saline  Amount of cleaning: extensive  Debridement: none  Degree of undermining: none  Skin closure: 4-0 nylon  Number of sutures: 4  Technique: simple  Approximation: close  Patient tolerance: Patient tolerated the procedure well with no immediate complications             ED Course  ED Course as of Aug 22 1509   Thu Aug 22, 2019   1245 No fracture, no foreign body   XR hand 3+ vw right   1351 D/W Dr Radhika Ambrosio who recommended closure of the primary wound, antibiotics, and said he would likely need surgery by 8/27/19  Since he is based out of [facility], he said the patient can choose to follow up with a closer hand specialist   I spoke with [facility] and Dr Linda Avila is able to assess for repair  The office has his demographic information and planning to call him with an office visit  MDM    Disposition  Final diagnoses:   Laceration of right little finger - with extensor tendon injury   Laceration of right ring finger     Time reflects when diagnosis was documented in both MDM as applicable and the Disposition within this note     Time User Action Codes Description Comment    8/22/2019  2:00 PM [address] [place] [address] Laceration of right little finger     8/22/2019  2:00 PM Domenico Walker Modify [K60 480P] Laceration of right little finger with extensor tendon injury    8/22/2019  2:00 PM Domenico Walker Add [S95 308U] Laceration of right ring finger       ED Disposition     ED Disposition Condition Date/Time Comment    Discharge Good Thu Aug 22, 2019 12:49 PM Thais Harrison discharge to home/self care  Follow-up Information     Follow up With Specialties Details Why [address] [facility]   [facility] [address] [facility]      Bong Reagan MD Orthopedic Surgery, Orthopedics  If you are not able to be seen by more local orthopedics   [address]  [facility]            Discharge Medication List as of 8/22/2019  2:03 PM      START taking these medications    Details   cephalexin (KEFLEX) 500 mg capsule Take 1 capsule (500 mg total) by mouth 2 (two) times a day for 7 days, Starting Thu 8/22/2019, Until Thu 8/29/2019, Print         CONTINUE these medications which have NOT CHANGED    Details   allopurinol (ZYLOPRIM) 100 mg tablet TAKE 1 TO 3 TABLETS BY MOUTH DAILY AS DIRECTED FOR GOUT, Normal      Ascorbic Acid (VITAMIN C) 1000 MG tablet Take 1 tablet by mouth daily, Starting Mon 11/20/2017, Historical Med      EPINEPHrine (EPIPEN) 0 3 mg/0 3 mL SOAJ Inject 0 3 mL (0 3 mg total) into a muscle once for 1 dose As needed for allergic reaction, Starting Tue 10/9/2018, Normal      methylPREDNISolone 4 MG tablet therapy pack Use as directed on package, Normal No discharge procedures on file      ED Provider  Electronically Signed by           Ruddy Allan MD  08/22/19 445 Salazar Goncalves MD  08/22/19 7536

## 2019-08-22 NOTE — DISCHARGE INSTRUCTIONS
Laceration   DISCHARGE INSTRUCTIONS:   Return to the emergency department if:   You have heavy bleeding or bleeding that does not stop after 10 minutes of holding firm, direct pressure over the wound  Your wound opens up  You have a fever or chills  Your laceration is red, warm, or swollen more than the just edges of the wound as it heals  You have red streaks on your skin coming from your wound  You have white or yellow drainage from the wound that smells bad  You have pain that gets worse, even after treatment  Do not get your wound wet  for the next 24 hours  After that you can keep it clean with running water and gentle soap  But do not soak your wound in water until the sutures have been removed  Do not go swimming until your sutures have been removed  Carefully wash the wound with soap and water  Gently pat the area dry or allow it to air dry  Change your bandages  when they get wet, dirty, or after washing--about once or twice per day  Do not apply elastic bandages or tape too tight  Do not put powders or lotions over your incision

## 2019-08-22 NOTE — TELEPHONE ENCOUNTER
Dr Meka Matthew from [address] [place] [address] ED called to state that patient is in [facility] ED and will need Flexor Tendon sx in the R pinky finger by next Tuesday  Dr Ally Ordaz is on call, however patient cannot travel to [place] for visits  Can we force an appt on with Dr Nickie Shipman out of [facility], or even [facility] for the initial visit? If appt with Dr Nickie Shipman is not available, patient will try to make arrangements about travel to [place]  Please contact patient to schedule consult ASAP  Patient callback CA#[phone]    Any questions for Dr Meka Matthew, contact DY#[phone]    *I also sent an email to [facility], practice admin to request a forced appt  none

## 2019-08-23 ENCOUNTER — OFFICE VISIT (OUTPATIENT)
Dept: OBGYN CLINIC | Facility: CLINIC | Age: 69
End: 2019-08-23
Payer: MEDICARE

## 2019-08-23 VITALS
HEIGHT: 74 IN | DIASTOLIC BLOOD PRESSURE: 92 MMHG | SYSTOLIC BLOOD PRESSURE: 160 MMHG | BODY MASS INDEX: 31.06 KG/M2 | HEART RATE: 65 BPM | WEIGHT: 242 LBS

## 2019-08-23 DIAGNOSIS — S61.401A FLEXOR TENDON LACERATION, HAND, OPEN WOUND, RIGHT, INITIAL ENCOUNTER: Primary | ICD-10-CM

## 2019-08-23 DIAGNOSIS — S66.821A FLEXOR TENDON LACERATION, HAND, OPEN WOUND, RIGHT, INITIAL ENCOUNTER: Primary | ICD-10-CM

## 2019-08-23 PROCEDURE — 99204 OFFICE O/P NEW MOD 45 MIN: CPT | Performed by: ORTHOPAEDIC SURGERY

## 2019-08-23 PROCEDURE — 1124F ACP DISCUSS-NO DSCNMKR DOCD: CPT | Performed by: PHYSICIAN ASSISTANT

## 2019-08-23 RX ORDER — HYDROCODONE BITARTRATE AND ACETAMINOPHEN 5; 325 MG/1; MG/1
1 TABLET ORAL EVERY 6 HOURS PRN
Qty: 20 TABLET | Refills: 0 | Status: SHIPPED | OUTPATIENT
Start: 2019-08-23 | End: 2019-09-06

## 2019-08-23 RX ORDER — CEFAZOLIN SODIUM 2 G/50ML
2000 SOLUTION INTRAVENOUS ONCE
Status: CANCELLED | OUTPATIENT
Start: 2019-08-23 | End: 2019-08-23

## 2019-08-23 NOTE — H&P
CHIEF COMPLAINT:  Chief Complaint   Patient presents with    Right Hand - Pain       SUBJECTIVE:  Briana Brunson is a 77y o  year old RHD male who presents right hand laceration of the small and ring finger  Patient states that he was cleaning a fish any sliced his small and ring finger on the right side  He presented to the emergency room on 08/22/2019 for evaluation  He had difficulty moving his small finger  He was able to move his ring finger  His tetanus shot was up-to-date  He was sutured over the PIP joints volar aspect instructed to follow up Orthopedics  Today presents with linear lacerations over the PIP joints of the small and ring finger volar side  He has difficulty moving his small finger into flexion  He is able to move his ring finger and flexion  He denies any numbness or tingling into the fingers  He has been taking Tylenol and ibuprofen as needed for the pain  He states the pain is constant the goes away with rest     The patient denies any cardiac or pulmonary issues  Denies diabetes  Denies any history of MI, gastric ulcers, kidney or liver issues  Denies blood thinners  Patient does have a history of abdominal aortic aneurysm which has measured at 4 3 cm the last 3 years            PAST MEDICAL HISTORY:  Past Medical History:   Diagnosis Date    Abnormal chest CT     RESOLVED: [id number]    Balance disorder     RESOLVED: [id number]    Lumbar radiculopathy     RESOLVED: [id number]    Lumbar spondylosis     RESOLVED: [id number]       PAST SURGICAL HISTORY:  Past Surgical History:   Procedure Laterality Date    CHOLECYSTECTOMY      TOTAL HIP ARTHROPLASTY Left 06/2016       FAMILY HISTORY:  Family History   Problem Relation Age of Onset    Hypertension Father     Arthritis Family     Colon polyps Family        SOCIAL HISTORY:  Social History     Tobacco Use    Smoking status: Current Every Day Smoker     Packs/day: 1 00    Smokeless tobacco: Never Used   Substance Use Topics    Alcohol use: Yes     Comment: DAILY     Drug use: Not on file       MEDICATIONS:    Current Outpatient Medications:     allopurinol (ZYLOPRIM) 100 mg tablet, TAKE 1 TO 3 TABLETS BY MOUTH DAILY AS DIRECTED FOR GOUT (Patient taking differently: TAKE 1 TABLETS BY MOUTH DAILY AS DIRECTED FOR GOUT), Disp: 270 tablet, Rfl: 3    Ascorbic Acid (VITAMIN C) 1000 MG tablet, Take 1 tablet by mouth daily, Disp: , Rfl:     cephalexin (KEFLEX) 500 mg capsule, Take 1 capsule (500 mg total) by mouth 2 (two) times a day for 7 days, Disp: 14 capsule, Rfl: 0    EPINEPHrine (EPIPEN) 0 3 mg/0 3 mL SOAJ, Inject 0 3 mL (0 3 mg total) into a muscle once for 1 dose As needed for allergic reaction, Disp: 0 3 mL, Rfl: 0    HYDROcodone-acetaminophen (NORCO) 5-325 mg per tablet, Take 1 tablet by mouth every 6 (six) hours as needed for painMax Daily Amount: 4 tablets, Disp: 20 tablet, Rfl: 0  No current facility-administered medications for this visit       ALLERGIES:  Allergies   Allergen Reactions    Atorvastatin Myalgia       REVIEW OF SYSTEMS:  Review of Systems  ROS:   General: no fever, no chills  HEENT:  No loss of hearing or eyesight problems  Eyes:  No red eyes  Respiratory:  No coughing, shortness of breath or wheezing  Cardiovascular:  No chest pain, no palpitations  GI:  Abdomen soft nontender, denies nausea  Endocrine:  No muscle weakness, no frequent urination, no excessive thirst  Urinary:  No dysuria, no incontinence  Musculoskeletal: see HPI and PE  SKIN:  No skin rash, no dry skin  Neurological:  No headaches, no confusion  Psychiatric:  No suicide thoughts, no anxiety, no depression  Review of all other systems is negative    VITALS:  Vitals:    08/23/19 1005   BP: 160/92   Pulse: 65       LABS:  HgA1c: No results found for: HGBA1C  BMP:   Lab Results   Component Value Date    GLUCOSE 95 10/08/2018    CALCIUM 9 9 10/08/2018     12/18/2015    K 4 7 10/08/2018    CO2 26 10/08/2018     10/08/2018 BUN 18 10/08/2018    CREATININE 0 92 10/08/2018       _____________________________________________________  PHYSICAL EXAMINATION:  General: well developed and well nourished, alert, oriented times 3 and appears comfortable  Psychiatric: Normal  HEENT: Trachea Midline, No torticollis  Heart:  Regular rate and rhythm  Lungs:  Clear to auscultation  Pulmonary: No audible wheezing or respiratory distress   Skin: No masses, erythema, lacerations, fluctation, ulcerations  Neurovascular: Sensation Intact to the Median, Ulnar, Radial Nerve, Motor Intact to the Median, Ulnar, Radial Nerve and Pulses Intact    MUSCULOSKELETAL EXAMINATION:  Right hand  Mild edema and swelling noted over the small and ring finger at the PIP joint  Linear laceration is appreciated over the volar aspect PIP joint of the small and ring finger with sutures intact  No active drainage, no signs or symptoms of infection  Decreased ability to flex the small finger at the PIP joint and DIP joint, he is able to flex his ring finger at the PIP and the MP joint  Patient is neurovascular intact    ___________________________________________________  STUDIES REVIEWED:  Images obtained on 08/22/2019 of the Right hand 3 views demonstrate No acute fractures or dislocations      PROCEDURES PERFORMED:  Procedures  No Procedures performed today    _____________________________________________________  ASSESSMENT/PLAN:    Right hand small finger flexor tendon laceration, right hand ring finger PIP joint skin laceration with no tendon involvement  - it was discussed with the patient that surgical intervention will be needed to repair the small finger tendon  Patient would like to proceed with procedure  Detail consent was obtained today       Surgery: right hand small finger flexor tendon repair   Anesthesia:  IV sedation   Antibiotics:  Ordered   Medical clearance: not needed   Hand therapy: ordered   Consent: obtained   Post op pain medication sent to pharmacy today    Surgery medication instructions: You will stop eating and drinking at midnight the night before your surgery, but you may continue to take your normal medications with a small sip of water  In the morning on the day of your surgery, we would like you to take the following medications (as long as you have never been told to avoid Tylenol or NSAIDs like ibuprofen, Naproxen, Aleve, Advil, etc):   Ibuprofen 600mg one tablet by mouth   Tylenol 500mg one tablet by mouth    After surgery, we would like you to take Ibuprofen 600mg one tablet by mouth every 6 hours with food (at breakfast, lunch and dinner)  AND Tylenol 500 mg one tablet by mouth every 6 hours  (at breakfast, lunch and dinner) for 5-7 days after your surgery  Please take these medication EVERYDAY after surgery for 5-7 days, and not just as needed  You can take these medications at the same time  Taking these medications after surgery will limit your need for prescription pain medication  We will also prescribe a narcotic pain medication for a limited time after surgery that you can take as needed for moderate or severe pain  Diagnoses and all orders for this visit:    Flexor tendon laceration, hand, open wound, right, initial encounter  -     Case request operating room: REPAIR FLEXOR TENDON right small FINGER; Standing  -     Case request operating room: REPAIR FLEXOR TENDON right small FINGER  -     HYDROcodone-acetaminophen ([address]) 5-325 mg per tablet; Take 1 tablet by mouth every 6 (six) hours as needed for painMax Daily Amount: 4 tablets  -     Ambulatory referral to PT/OT hand therapy; Future    Other orders  -     Diet NPO; Sips with meds; Standing  -     Height and weight upon arrival; Standing  -     Void on call to OR; Standing  -     Insert peripheral IV; Standing  -     ceFAZolin (ANCEF) IVPB (premix) 2,000 mg        Follow Up:  Return for post op         To Do Next Visit:  Re-evaluation of current issue and Sutures out      Operative Discussions:  Flexor Tendon Repair: The patient has elected to undergo operative repair of a lacerated flexor tendon  During surgery, an incision will be made in the palmar aspect of the hand which will be extended proximally to the level of tendon retraction  The tendon will be delivered distally, and sutured to the remaining portion of the tendon  Postoperatively, the splint will be applied in a protected position  Postoperative physical therapy for up to 12-16 weeks is a necessity to help improve outcomes  The risks of the surgery include tendon rupture, stiffness, pain, incomplete motion, and approximately a 20-30% chance for repeat surgery such as tenolysis  The risks and benefits of the procedure were explained to the patient, which include, but are not limited to: Bleeding, infection, recurrence, pain, scar, damage to tendons, damage to nerves, and damage to blood vessels, failure to give desired results and complications related to anesthesia  These risks, along with alternative conservative treatment options, and postoperative protocols were voiced back and understood by the patient  All questions were answered to the patient's satisfaction  The patient agrees to comply with a standard postoperative protocol, and is willing to proceed  Education was provided via written and auditory forms  There were no barriers to learning  Written handouts regarding wound care, incision and scar care, and general preoperative information was provided to the patient  Prior to surgery, the patient may be requested to stop all anti-inflammatory medications  Prophylactic aspirin, Plavix, and Coumadin may be allowed to be continued  Medications including vitamin E , ginkgo, and fish oil are requested to be stopped approximately one week prior to surgery  Hypertensive medications and beta blockers, if taken, should be continued      Scribe Attestation    I,:   Sandie Gregorio PA-C am acting as a scribe while in the presence of the attending physician :        I,:   Kezia Markham MD personally performed the services described in this documentation    as scribed in my presence :

## 2019-08-23 NOTE — PROGRESS NOTES
CHIEF COMPLAINT:  Chief Complaint   Patient presents with    Right Hand - Pain       SUBJECTIVE:  Russell Cunningham is a 77y o  year old RHD male who presents right hand laceration of the small and ring finger  Patient states that he was cleaning a fish any sliced his small and ring finger on the right side  He presented to the emergency room on 08/22/2019 for evaluation  He had difficulty moving his small finger  He was able to move his ring finger  His tetanus shot was up-to-date  He was sutured over the PIP joints volar aspect instructed to follow up Orthopedics  Today presents with linear lacerations over the PIP joints of the small and ring finger volar side  He has difficulty moving his small finger into flexion  He is able to move his ring finger and flexion  He denies any numbness or tingling into the fingers  He has been taking Tylenol and ibuprofen as needed for the pain  He states the pain is constant the goes away with rest     The patient denies any cardiac or pulmonary issues  Denies diabetes  Denies any history of MI, gastric ulcers, kidney or liver issues  Denies blood thinners  Patient does have a history of abdominal aortic aneurysm which has measured at 4 3 cm the last 3 years            PAST MEDICAL HISTORY:  Past Medical History:   Diagnosis Date    Abnormal chest CT     RESOLVED: [id number]    Balance disorder     RESOLVED: [id number]    Lumbar radiculopathy     RESOLVED: [id number]    Lumbar spondylosis     RESOLVED: [id number]       PAST SURGICAL HISTORY:  Past Surgical History:   Procedure Laterality Date    CHOLECYSTECTOMY      TOTAL HIP ARTHROPLASTY Left 06/2016       FAMILY HISTORY:  Family History   Problem Relation Age of Onset    Hypertension Father     Arthritis Family     Colon polyps Family        SOCIAL HISTORY:  Social History     Tobacco Use    Smoking status: Current Every Day Smoker     Packs/day: 1 00    Smokeless tobacco: Never Used   Substance Use Topics    Alcohol use: Yes     Comment: DAILY     Drug use: Not on file       MEDICATIONS:    Current Outpatient Medications:     allopurinol (ZYLOPRIM) 100 mg tablet, TAKE 1 TO 3 TABLETS BY MOUTH DAILY AS DIRECTED FOR GOUT (Patient taking differently: TAKE 1 TABLETS BY MOUTH DAILY AS DIRECTED FOR GOUT), Disp: 270 tablet, Rfl: 3    Ascorbic Acid (VITAMIN C) 1000 MG tablet, Take 1 tablet by mouth daily, Disp: , Rfl:     cephalexin (KEFLEX) 500 mg capsule, Take 1 capsule (500 mg total) by mouth 2 (two) times a day for 7 days, Disp: 14 capsule, Rfl: 0    EPINEPHrine (EPIPEN) 0 3 mg/0 3 mL SOAJ, Inject 0 3 mL (0 3 mg total) into a muscle once for 1 dose As needed for allergic reaction, Disp: 0 3 mL, Rfl: 0    HYDROcodone-acetaminophen (NORCO) 5-325 mg per tablet, Take 1 tablet by mouth every 6 (six) hours as needed for painMax Daily Amount: 4 tablets, Disp: 20 tablet, Rfl: 0  No current facility-administered medications for this visit       ALLERGIES:  Allergies   Allergen Reactions    Atorvastatin Myalgia       REVIEW OF SYSTEMS:  Review of Systems  ROS:   General: no fever, no chills  HEENT:  No loss of hearing or eyesight problems  Eyes:  No red eyes  Respiratory:  No coughing, shortness of breath or wheezing  Cardiovascular:  No chest pain, no palpitations  GI:  Abdomen soft nontender, denies nausea  Endocrine:  No muscle weakness, no frequent urination, no excessive thirst  Urinary:  No dysuria, no incontinence  Musculoskeletal: see HPI and PE  SKIN:  No skin rash, no dry skin  Neurological:  No headaches, no confusion  Psychiatric:  No suicide thoughts, no anxiety, no depression  Review of all other systems is negative    VITALS:  Vitals:    08/23/19 1005   BP: 160/92   Pulse: 65       LABS:  HgA1c: No results found for: HGBA1C  BMP:   Lab Results   Component Value Date    GLUCOSE 95 10/08/2018    CALCIUM 9 9 10/08/2018     12/18/2015    K 4 7 10/08/2018    CO2 26 10/08/2018     10/08/2018 BUN 18 10/08/2018    CREATININE 0 92 10/08/2018       _____________________________________________________  PHYSICAL EXAMINATION:  General: well developed and well nourished, alert, oriented times 3 and appears comfortable  Psychiatric: Normal  HEENT: Trachea Midline, No torticollis  Heart:  Regular rate and rhythm  Lungs:  Clear to auscultation  Pulmonary: No audible wheezing or respiratory distress   Skin: No masses, erythema, lacerations, fluctation, ulcerations  Neurovascular: Sensation Intact to the Median, Ulnar, Radial Nerve, Motor Intact to the Median, Ulnar, Radial Nerve and Pulses Intact    MUSCULOSKELETAL EXAMINATION:  Right hand  Mild edema and swelling noted over the small and ring finger at the PIP joint  Linear laceration is appreciated over the volar aspect PIP joint of the small and ring finger with sutures intact  No active drainage, no signs or symptoms of infection  Decreased ability to flex the small finger at the PIP joint and DIP joint, he is able to flex his ring finger at the PIP and the MP joint  Patient is neurovascular intact    ___________________________________________________  STUDIES REVIEWED:  Images obtained on 08/22/2019 of the Right hand 3 views demonstrate No acute fractures or dislocations      PROCEDURES PERFORMED:  Procedures  No Procedures performed today    _____________________________________________________  ASSESSMENT/PLAN:    Right hand small finger flexor tendon laceration, right hand ring finger PIP joint skin laceration with no tendon involvement  - it was discussed with the patient that surgical intervention will be needed to repair the small finger tendon  Patient would like to proceed with procedure  Detail consent was obtained today       Surgery: right hand small finger flexor tendon repair   Anesthesia:  IV sedation   Antibiotics:  Ordered   Medical clearance: not needed   Hand therapy: ordered   Consent: obtained   Post op pain medication sent to pharmacy today    Surgery medication instructions: You will stop eating and drinking at midnight the night before your surgery, but you may continue to take your normal medications with a small sip of water  In the morning on the day of your surgery, we would like you to take the following medications (as long as you have never been told to avoid Tylenol or NSAIDs like ibuprofen, Naproxen, Aleve, Advil, etc):   Ibuprofen 600mg one tablet by mouth   Tylenol 500mg one tablet by mouth    After surgery, we would like you to take Ibuprofen 600mg one tablet by mouth every 6 hours with food (at breakfast, lunch and dinner)  AND Tylenol 500 mg one tablet by mouth every 6 hours  (at breakfast, lunch and dinner) for 5-7 days after your surgery  Please take these medication EVERYDAY after surgery for 5-7 days, and not just as needed  You can take these medications at the same time  Taking these medications after surgery will limit your need for prescription pain medication  We will also prescribe a narcotic pain medication for a limited time after surgery that you can take as needed for moderate or severe pain  Diagnoses and all orders for this visit:    Flexor tendon laceration, hand, open wound, right, initial encounter  -     Case request operating room: REPAIR FLEXOR TENDON right small FINGER; Standing  -     Case request operating room: REPAIR FLEXOR TENDON right small FINGER  -     HYDROcodone-acetaminophen ([address]) 5-325 mg per tablet; Take 1 tablet by mouth every 6 (six) hours as needed for painMax Daily Amount: 4 tablets  -     Ambulatory referral to PT/OT hand therapy; Future    Other orders  -     Diet NPO; Sips with meds; Standing  -     Height and weight upon arrival; Standing  -     Void on call to OR; Standing  -     Insert peripheral IV; Standing  -     ceFAZolin (ANCEF) IVPB (premix) 2,000 mg        Follow Up:  Return for post op         To Do Next Visit:  Re-evaluation of current issue and Sutures out      Operative Discussions:  Flexor Tendon Repair: The patient has elected to undergo operative repair of a lacerated flexor tendon  During surgery, an incision will be made in the palmar aspect of the hand which will be extended proximally to the level of tendon retraction  The tendon will be delivered distally, and sutured to the remaining portion of the tendon  Postoperatively, the splint will be applied in a protected position  Postoperative physical therapy for up to 12-16 weeks is a necessity to help improve outcomes  The risks of the surgery include tendon rupture, stiffness, pain, incomplete motion, and approximately a 20-30% chance for repeat surgery such as tenolysis  The risks and benefits of the procedure were explained to the patient, which include, but are not limited to: Bleeding, infection, recurrence, pain, scar, damage to tendons, damage to nerves, and damage to blood vessels, failure to give desired results and complications related to anesthesia  These risks, along with alternative conservative treatment options, and postoperative protocols were voiced back and understood by the patient  All questions were answered to the patient's satisfaction  The patient agrees to comply with a standard postoperative protocol, and is willing to proceed  Education was provided via written and auditory forms  There were no barriers to learning  Written handouts regarding wound care, incision and scar care, and general preoperative information was provided to the patient  Prior to surgery, the patient may be requested to stop all anti-inflammatory medications  Prophylactic aspirin, Plavix, and Coumadin may be allowed to be continued  Medications including vitamin E , ginkgo, and fish oil are requested to be stopped approximately one week prior to surgery  Hypertensive medications and beta blockers, if taken, should be continued      Scribe Attestation    I,:   Moe Holt PA-C am acting as a scribe while in the presence of the attending physician :        I,:   Patience Lynn MD personally performed the services described in this documentation    as scribed in my presence :

## 2019-08-23 NOTE — H&P (VIEW-ONLY)
CHIEF COMPLAINT:  Chief Complaint   Patient presents with    Right Hand - Pain       SUBJECTIVE:  Myra Valenzuela is a 77y o  year old RHD male who presents right hand laceration of the small and ring finger  Patient states that he was cleaning a fish any sliced his small and ring finger on the right side  He presented to the emergency room on 08/22/2019 for evaluation  He had difficulty moving his small finger  He was able to move his ring finger  His tetanus shot was up-to-date  He was sutured over the PIP joints volar aspect instructed to follow up Orthopedics  Today presents with linear lacerations over the PIP joints of the small and ring finger volar side  He has difficulty moving his small finger into flexion  He is able to move his ring finger and flexion  He denies any numbness or tingling into the fingers  He has been taking Tylenol and ibuprofen as needed for the pain  He states the pain is constant the goes away with rest     The patient denies any cardiac or pulmonary issues  Denies diabetes  Denies any history of MI, gastric ulcers, kidney or liver issues  Denies blood thinners  Patient does have a history of abdominal aortic aneurysm which has measured at 4 3 cm the last 3 years            PAST MEDICAL HISTORY:  Past Medical History:   Diagnosis Date    Abnormal chest CT     RESOLVED: [id number]    Balance disorder     RESOLVED: [id number]    Lumbar radiculopathy     RESOLVED: [id number]    Lumbar spondylosis     RESOLVED: [id number]       PAST SURGICAL HISTORY:  Past Surgical History:   Procedure Laterality Date    CHOLECYSTECTOMY      TOTAL HIP ARTHROPLASTY Left 06/2016       FAMILY HISTORY:  Family History   Problem Relation Age of Onset    Hypertension Father     Arthritis Family     Colon polyps Family        SOCIAL HISTORY:  Social History     Tobacco Use    Smoking status: Current Every Day Smoker     Packs/day: 1 00    Smokeless tobacco: Never Used   Substance Use Topics    Alcohol use: Yes     Comment: DAILY     Drug use: Not on file       MEDICATIONS:    Current Outpatient Medications:     allopurinol (ZYLOPRIM) 100 mg tablet, TAKE 1 TO 3 TABLETS BY MOUTH DAILY AS DIRECTED FOR GOUT (Patient taking differently: TAKE 1 TABLETS BY MOUTH DAILY AS DIRECTED FOR GOUT), Disp: 270 tablet, Rfl: 3    Ascorbic Acid (VITAMIN C) 1000 MG tablet, Take 1 tablet by mouth daily, Disp: , Rfl:     cephalexin (KEFLEX) 500 mg capsule, Take 1 capsule (500 mg total) by mouth 2 (two) times a day for 7 days, Disp: 14 capsule, Rfl: 0    EPINEPHrine (EPIPEN) 0 3 mg/0 3 mL SOAJ, Inject 0 3 mL (0 3 mg total) into a muscle once for 1 dose As needed for allergic reaction, Disp: 0 3 mL, Rfl: 0    HYDROcodone-acetaminophen (NORCO) 5-325 mg per tablet, Take 1 tablet by mouth every 6 (six) hours as needed for painMax Daily Amount: 4 tablets, Disp: 20 tablet, Rfl: 0  No current facility-administered medications for this visit       ALLERGIES:  Allergies   Allergen Reactions    Atorvastatin Myalgia       REVIEW OF SYSTEMS:  Review of Systems  ROS:   General: no fever, no chills  HEENT:  No loss of hearing or eyesight problems  Eyes:  No red eyes  Respiratory:  No coughing, shortness of breath or wheezing  Cardiovascular:  No chest pain, no palpitations  GI:  Abdomen soft nontender, denies nausea  Endocrine:  No muscle weakness, no frequent urination, no excessive thirst  Urinary:  No dysuria, no incontinence  Musculoskeletal: see HPI and PE  SKIN:  No skin rash, no dry skin  Neurological:  No headaches, no confusion  Psychiatric:  No suicide thoughts, no anxiety, no depression  Review of all other systems is negative    VITALS:  Vitals:    08/23/19 1005   BP: 160/92   Pulse: 65       LABS:  HgA1c: No results found for: HGBA1C  BMP:   Lab Results   Component Value Date    GLUCOSE 95 10/08/2018    CALCIUM 9 9 10/08/2018     12/18/2015    K 4 7 10/08/2018    CO2 26 10/08/2018     10/08/2018 BUN 18 10/08/2018    CREATININE 0 92 10/08/2018       _____________________________________________________  PHYSICAL EXAMINATION:  General: well developed and well nourished, alert, oriented times 3 and appears comfortable  Psychiatric: Normal  HEENT: Trachea Midline, No torticollis  Heart:  Regular rate and rhythm  Lungs:  Clear to auscultation  Pulmonary: No audible wheezing or respiratory distress   Skin: No masses, erythema, lacerations, fluctation, ulcerations  Neurovascular: Sensation Intact to the Median, Ulnar, Radial Nerve, Motor Intact to the Median, Ulnar, Radial Nerve and Pulses Intact    MUSCULOSKELETAL EXAMINATION:  Right hand  Mild edema and swelling noted over the small and ring finger at the PIP joint  Linear laceration is appreciated over the volar aspect PIP joint of the small and ring finger with sutures intact  No active drainage, no signs or symptoms of infection  Decreased ability to flex the small finger at the PIP joint and DIP joint, he is able to flex his ring finger at the PIP and the MP joint  Patient is neurovascular intact    ___________________________________________________  STUDIES REVIEWED:  Images obtained on 08/22/2019 of the Right hand 3 views demonstrate No acute fractures or dislocations      PROCEDURES PERFORMED:  Procedures  No Procedures performed today    _____________________________________________________  ASSESSMENT/PLAN:    Right hand small finger flexor tendon laceration, right hand ring finger PIP joint skin laceration with no tendon involvement  - it was discussed with the patient that surgical intervention will be needed to repair the small finger tendon  Patient would like to proceed with procedure  Detail consent was obtained today       Surgery: right hand small finger flexor tendon repair   Anesthesia:  IV sedation   Antibiotics:  Ordered   Medical clearance: not needed   Hand therapy: ordered   Consent: obtained   Post op pain medication sent to pharmacy today    Surgery medication instructions: You will stop eating and drinking at midnight the night before your surgery, but you may continue to take your normal medications with a small sip of water  In the morning on the day of your surgery, we would like you to take the following medications (as long as you have never been told to avoid Tylenol or NSAIDs like ibuprofen, Naproxen, Aleve, Advil, etc):   Ibuprofen 600mg one tablet by mouth   Tylenol 500mg one tablet by mouth    After surgery, we would like you to take Ibuprofen 600mg one tablet by mouth every 6 hours with food (at breakfast, lunch and dinner)  AND Tylenol 500 mg one tablet by mouth every 6 hours  (at breakfast, lunch and dinner) for 5-7 days after your surgery  Please take these medication EVERYDAY after surgery for 5-7 days, and not just as needed  You can take these medications at the same time  Taking these medications after surgery will limit your need for prescription pain medication  We will also prescribe a narcotic pain medication for a limited time after surgery that you can take as needed for moderate or severe pain  Diagnoses and all orders for this visit:    Flexor tendon laceration, hand, open wound, right, initial encounter  -     Case request operating room: REPAIR FLEXOR TENDON right small FINGER; Standing  -     Case request operating room: REPAIR FLEXOR TENDON right small FINGER  -     HYDROcodone-acetaminophen ([address]) 5-325 mg per tablet; Take 1 tablet by mouth every 6 (six) hours as needed for painMax Daily Amount: 4 tablets  -     Ambulatory referral to PT/OT hand therapy; Future    Other orders  -     Diet NPO; Sips with meds; Standing  -     Height and weight upon arrival; Standing  -     Void on call to OR; Standing  -     Insert peripheral IV; Standing  -     ceFAZolin (ANCEF) IVPB (premix) 2,000 mg        Follow Up:  Return for post op         To Do Next Visit:  Re-evaluation of current issue and Sutures out      Operative Discussions:  Flexor Tendon Repair: The patient has elected to undergo operative repair of a lacerated flexor tendon  During surgery, an incision will be made in the palmar aspect of the hand which will be extended proximally to the level of tendon retraction  The tendon will be delivered distally, and sutured to the remaining portion of the tendon  Postoperatively, the splint will be applied in a protected position  Postoperative physical therapy for up to 12-16 weeks is a necessity to help improve outcomes  The risks of the surgery include tendon rupture, stiffness, pain, incomplete motion, and approximately a 20-30% chance for repeat surgery such as tenolysis  The risks and benefits of the procedure were explained to the patient, which include, but are not limited to: Bleeding, infection, recurrence, pain, scar, damage to tendons, damage to nerves, and damage to blood vessels, failure to give desired results and complications related to anesthesia  These risks, along with alternative conservative treatment options, and postoperative protocols were voiced back and understood by the patient  All questions were answered to the patient's satisfaction  The patient agrees to comply with a standard postoperative protocol, and is willing to proceed  Education was provided via written and auditory forms  There were no barriers to learning  Written handouts regarding wound care, incision and scar care, and general preoperative information was provided to the patient  Prior to surgery, the patient may be requested to stop all anti-inflammatory medications  Prophylactic aspirin, Plavix, and Coumadin may be allowed to be continued  Medications including vitamin E , ginkgo, and fish oil are requested to be stopped approximately one week prior to surgery  Hypertensive medications and beta blockers, if taken, should be continued      Scribe Attestation    I,:   Luis Antonio Plata PA-C am acting as a scribe while in the presence of the attending physician :        I,:   Lawyer Andres MD personally performed the services described in this documentation    as scribed in my presence :

## 2019-08-26 ENCOUNTER — ANESTHESIA EVENT (OUTPATIENT)
Dept: PERIOP | Facility: HOSPITAL | Age: 69
End: 2019-08-26
Payer: MEDICARE

## 2019-08-26 NOTE — PRE-PROCEDURE INSTRUCTIONS
Pre-Surgery Instructions:   Medication Instructions    allopurinol (ZYLOPRIM) 100 mg tablet Instructed patient per Anesthesia Guidelines   Ascorbic Acid (VITAMIN C) 1000 MG tablet Instructed patient per Anesthesia Guidelines   cephalexin (KEFLEX) 500 mg capsule Instructed patient per Anesthesia Guidelines

## 2019-08-27 ENCOUNTER — HOSPITAL ENCOUNTER (OUTPATIENT)
Facility: HOSPITAL | Age: 69
Setting detail: OUTPATIENT SURGERY
Discharge: HOME | End: 2019-08-27
Attending: ORTHOPAEDIC SURGERY | Admitting: ORTHOPAEDIC SURGERY
Payer: MEDICARE

## 2019-08-27 ENCOUNTER — ANESTHESIA (OUTPATIENT)
Dept: PERIOP | Facility: HOSPITAL | Age: 69
End: 2019-08-27
Payer: MEDICARE

## 2019-08-27 VITALS
DIASTOLIC BLOOD PRESSURE: 90 MMHG | WEIGHT: 238.32 LBS | TEMPERATURE: 99.3 F | HEIGHT: 76 IN | BODY MASS INDEX: 29.02 KG/M2 | RESPIRATION RATE: 18 BRPM | SYSTOLIC BLOOD PRESSURE: 156 MMHG | HEART RATE: 65 BPM | OXYGEN SATURATION: 95 %

## 2019-08-27 PROCEDURE — 26356 REPAIR FINGER/HAND TENDON: CPT | Performed by: ORTHOPAEDIC SURGERY

## 2019-08-27 PROCEDURE — 26356 REPAIR FINGER/HAND TENDON: CPT | Performed by: PHYSICIAN ASSISTANT

## 2019-08-27 RX ORDER — SODIUM CHLORIDE, SODIUM LACTATE, POTASSIUM CHLORIDE, CALCIUM CHLORIDE 600; 310; 30; 20 MG/100ML; MG/100ML; MG/100ML; MG/100ML
125 INJECTION, SOLUTION INTRAVENOUS CONTINUOUS
Status: DISCONTINUED | OUTPATIENT
Start: 2019-08-27 | End: 2019-08-27 | Stop reason: HOSPADM

## 2019-08-27 RX ORDER — ONDANSETRON 2 MG/ML
4 INJECTION INTRAMUSCULAR; INTRAVENOUS EVERY 6 HOURS PRN
Status: CANCELLED | OUTPATIENT
Start: 2019-08-27

## 2019-08-27 RX ORDER — PROPOFOL 10 MG/ML
INJECTION, EMULSION INTRAVENOUS CONTINUOUS PRN
Status: DISCONTINUED | OUTPATIENT
Start: 2019-08-27 | End: 2019-08-27 | Stop reason: SURG

## 2019-08-27 RX ORDER — ACETAMINOPHEN 325 MG/1
650 TABLET ORAL EVERY 6 HOURS PRN
Status: CANCELLED | OUTPATIENT
Start: 2019-08-27

## 2019-08-27 RX ORDER — BUPIVACAINE HYDROCHLORIDE 5 MG/ML
INJECTION, SOLUTION EPIDURAL; INTRACAUDAL AS NEEDED
Status: DISCONTINUED | OUTPATIENT
Start: 2019-08-27 | End: 2019-08-27 | Stop reason: HOSPADM

## 2019-08-27 RX ORDER — PROPOFOL 10 MG/ML
INJECTION, EMULSION INTRAVENOUS AS NEEDED
Status: DISCONTINUED | OUTPATIENT
Start: 2019-08-27 | End: 2019-08-27 | Stop reason: SURG

## 2019-08-27 RX ORDER — SODIUM CHLORIDE, SODIUM LACTATE, POTASSIUM CHLORIDE, CALCIUM CHLORIDE 600; 310; 30; 20 MG/100ML; MG/100ML; MG/100ML; MG/100ML
INJECTION, SOLUTION INTRAVENOUS CONTINUOUS PRN
Status: DISCONTINUED | OUTPATIENT
Start: 2019-08-27 | End: 2019-08-27 | Stop reason: SURG

## 2019-08-27 RX ORDER — SODIUM CHLORIDE, SODIUM LACTATE, POTASSIUM CHLORIDE, CALCIUM CHLORIDE 600; 310; 30; 20 MG/100ML; MG/100ML; MG/100ML; MG/100ML
125 INJECTION, SOLUTION INTRAVENOUS CONTINUOUS
Status: CANCELLED | OUTPATIENT
Start: 2019-08-27

## 2019-08-27 RX ORDER — ONDANSETRON 2 MG/ML
4 INJECTION INTRAMUSCULAR; INTRAVENOUS ONCE AS NEEDED
Status: DISCONTINUED | OUTPATIENT
Start: 2019-08-27 | End: 2019-08-27 | Stop reason: HOSPADM

## 2019-08-27 RX ORDER — MIDAZOLAM HYDROCHLORIDE 1 MG/ML
INJECTION INTRAMUSCULAR; INTRAVENOUS AS NEEDED
Status: DISCONTINUED | OUTPATIENT
Start: 2019-08-27 | End: 2019-08-27 | Stop reason: SURG

## 2019-08-27 RX ORDER — HYDROMORPHONE HCL/PF 1 MG/ML
0.5 SYRINGE (ML) INJECTION
Status: DISCONTINUED | OUTPATIENT
Start: 2019-08-27 | End: 2019-08-27 | Stop reason: HOSPADM

## 2019-08-27 RX ORDER — EPHEDRINE SULFATE 50 MG/ML
INJECTION INTRAVENOUS AS NEEDED
Status: DISCONTINUED | OUTPATIENT
Start: 2019-08-27 | End: 2019-08-27 | Stop reason: SURG

## 2019-08-27 RX ORDER — KETAMINE HCL IN NACL, ISO-OSM 100MG/10ML
SYRINGE (ML) INJECTION AS NEEDED
Status: DISCONTINUED | OUTPATIENT
Start: 2019-08-27 | End: 2019-08-27 | Stop reason: SURG

## 2019-08-27 RX ORDER — TRAMADOL HYDROCHLORIDE 50 MG/1
50 TABLET ORAL EVERY 6 HOURS SCHEDULED
Status: CANCELLED | OUTPATIENT
Start: 2019-08-27

## 2019-08-27 RX ORDER — METOCLOPRAMIDE HYDROCHLORIDE 5 MG/ML
5 INJECTION INTRAMUSCULAR; INTRAVENOUS ONCE AS NEEDED
Status: DISCONTINUED | OUTPATIENT
Start: 2019-08-27 | End: 2019-08-27 | Stop reason: HOSPADM

## 2019-08-27 RX ORDER — LIDOCAINE HYDROCHLORIDE 20 MG/ML
INJECTION, SOLUTION INFILTRATION; PERINEURAL AS NEEDED
Status: DISCONTINUED | OUTPATIENT
Start: 2019-08-27 | End: 2019-08-27 | Stop reason: SURG

## 2019-08-27 RX ORDER — MAGNESIUM HYDROXIDE 1200 MG/15ML
LIQUID ORAL AS NEEDED
Status: DISCONTINUED | OUTPATIENT
Start: 2019-08-27 | End: 2019-08-27 | Stop reason: HOSPADM

## 2019-08-27 RX ORDER — LIDOCAINE HYDROCHLORIDE 10 MG/ML
INJECTION, SOLUTION EPIDURAL; INFILTRATION; INTRACAUDAL; PERINEURAL AS NEEDED
Status: DISCONTINUED | OUTPATIENT
Start: 2019-08-27 | End: 2019-08-27 | Stop reason: HOSPADM

## 2019-08-27 RX ORDER — CEFAZOLIN SODIUM 2 G/50ML
2000 SOLUTION INTRAVENOUS ONCE
Status: COMPLETED | OUTPATIENT
Start: 2019-08-27 | End: 2019-08-27

## 2019-08-27 RX ORDER — FENTANYL CITRATE/PF 50 MCG/ML
25 SYRINGE (ML) INJECTION
Status: DISCONTINUED | OUTPATIENT
Start: 2019-08-27 | End: 2019-08-27 | Stop reason: HOSPADM

## 2019-08-27 RX ADMIN — PROPOFOL 100 MCG/KG/MIN: 10 INJECTION, EMULSION INTRAVENOUS at 15:35

## 2019-08-27 RX ADMIN — LIDOCAINE HYDROCHLORIDE 40 ML: 20 INJECTION, SOLUTION INFILTRATION; PERINEURAL at 15:35

## 2019-08-27 RX ADMIN — PROPOFOL 50 MG: 10 INJECTION, EMULSION INTRAVENOUS at 15:35

## 2019-08-27 RX ADMIN — SODIUM CHLORIDE, SODIUM LACTATE, POTASSIUM CHLORIDE, AND CALCIUM CHLORIDE: .6; .31; .03; .02 INJECTION, SOLUTION INTRAVENOUS at 15:11

## 2019-08-27 RX ADMIN — CEFAZOLIN SODIUM 2000 MG: 2 SOLUTION INTRAVENOUS at 15:35

## 2019-08-27 RX ADMIN — Medication 20 MG: at 15:41

## 2019-08-27 RX ADMIN — SODIUM CHLORIDE, SODIUM LACTATE, POTASSIUM CHLORIDE, AND CALCIUM CHLORIDE 125 ML/HR: .6; .31; .03; .02 INJECTION, SOLUTION INTRAVENOUS at 13:07

## 2019-08-27 RX ADMIN — Medication 10 MG: at 15:39

## 2019-08-27 RX ADMIN — EPHEDRINE SULFATE 10 MG: 50 INJECTION, SOLUTION INTRAVENOUS at 16:01

## 2019-08-27 RX ADMIN — MIDAZOLAM HYDROCHLORIDE 2 MG: 1 INJECTION, SOLUTION INTRAMUSCULAR; INTRAVENOUS at 15:28

## 2019-08-27 NOTE — ANESTHESIA POSTPROCEDURE EVALUATION
Post-Op Assessment Note    CV Status:  Stable  Pain Score: 0    Pain management: adequate     Mental Status:  Sleepy   Hydration Status:  Euvolemic   PONV Controlled:  Controlled   Airway Patency:  Patent   Post Op Vitals Reviewed: Yes      Staff: CRNA   Comments: vss sv nonobstructed uneventful          /94   Temp   99 3   Pulse  89   Resp 24   SpO2 94

## 2019-08-27 NOTE — DISCHARGE INSTRUCTIONS
Post Operative Instructions    You have had surgery on your arm today, please read and follow the information below:  · Elevate your hand above your elbow during the next 24-48 hours to help with swelling  · Place your hand and arm over your head with motion at your shoulder three times a day  · Do not apply any cream/ointment/oil to your incisions including antibiotics  · Do not soak your hands in standing water (dishwater, tubs, Jacuzzi's, pools, etc ) until given permission (typically 2-3 weeks after injury)    Call the office at [phone]  if you notice any:  · Increased numbness or tingling of your hand or fingers that is not relieved with elevation  · Increasing pain that is not controlled with medication  · Difficulty chewing, breathing, swallowing  · Chest pains or shortness of breath  · Fever over 101 4 degrees  Bandage: Your therapist will remove your bandage at your first therapy appointment  Motion: Move fingers into a fist 5 times a day, DO NOT move any splinted fingers  Weight bearing status: The operated extremity should be non-weight bearing until further notice  Ice: Ice for 10 minutes every hour as needed for swelling x 24 hours  Sling: No sling necessary  Pain medication: A prescription for pain medication was provided in the office and sent to your pharmacy  Your prescription pain medication also contains Tylenol  Please limit total Tylenol dose to under 3,000 mg a day  After surgery, we would like you to take Tylenol 500 mg one tablet by mouth every 6 hours  (at breakfast, lunch and dinner) for 5-7 days after your surgery  Please take this medication EVERYDAY after surgery for 5-7 days, and not just as needed  Taking this medications after surgery will limit your need for prescription pain medication        If the pain becomes severe, and the pain medication is not alleviating symptoms, The greatest source of pain after surgery is usually a tight dressing due to increased swelling after surgery  If the pain becomes severe after surgery, and the patient medication is NOT alleviating the symptoms, the patient should do the following: The patient should  loosen the top dressing (usually coban or an ace bandage) and loosen/cut the rolled gauze beneath  The 4x4 gauze that is directly covering the incision should remain in place  The splint (if the patient has one) should remain in place  The ace bandage/coban can then be replaced on top in a less constrictive manor  If this does not help relieve the pain/numbness in a few hours, the patient should call our office (number listed below)  and we can have them seen in the office for further evaluation  Follow-up Appointment: 7-10 days with Dr Corinne Rodriguez  Occupational Therapy:  [employer] EVALUATION AND TO START RANGE OF MOTION  AFTER THE FIRST THERAPY APPOINTMENT, the patient may remove the splint/dressing for showering and clean the incision with soap and water  Keep incision dry after washing  Do not expose the incision to dirty water (oceans, pools, hot tubs, etc)     If you need help scheduling Therapy, you can call [phone]        Please call the office at [phone] if you have any questions or concerns regarding your post-operative care

## 2019-08-27 NOTE — INTERVAL H&P NOTE
H&P reviewed  After examining the patient I find no changes in the patients condition since the H&P had been written      Vitals:    08/27/19 1251   BP: (!) 184/98   Pulse: 73   Resp: 22   Temp: 97 8 °F (36 6 °C)   SpO2: 96%

## 2019-08-27 NOTE — ANESTHESIA PREPROCEDURE EVALUATION
Review of Systems/Medical History  Patient summary reviewed        Cardiovascular  EKG reviewed, Hyperlipidemia, Hypertension ,   Comment: Aneurysm of ascending aorta (HCC)Stable 4 3 cm ascending thoracic aortic aneurysm is present    ,  Pulmonary  Smoker cigarette smoker  , Tobacco cessation counseling given ,        GI/Hepatic  Negative GI/hepatic ROS          Negative  ROS        Endo/Other  Negative endo/other ROS      GYN  Negative gynecology ROS          Hematology  Negative hematology ROS      Musculoskeletal    Arthritis     Neurology  Negative neurology ROS      Psychology   Negative psychology ROS                   Anesthesia Plan  ASA Score- 2     Anesthesia Type- IV sedation with anesthesia with ASA Monitors  Additional Monitors:   Airway Plan:         Plan Factors-    Induction- intravenous  Postoperative Plan-     Informed Consent- Anesthetic plan and risks discussed with patient  I personally reviewed this patient with the CRNA  Discussed and agreed on the Anesthesia Plan with the CRNA  Cedric Alexandre

## 2019-08-27 NOTE — OP NOTE
OPERATIVE REPORT    PATIENT NAME: Monika [facility] RECORD NO:  [id number]    PROCEDURE DATE:  19    :  1950    SURGEON:  Sabas Babinski D Katha Friedlander, M D , Ph D     Rama Draft:  Papa Aguirre    No qualified resident was available to assist for this surgery   A Physician Assistant was used to assist in tissue retraction, aid in tendon repair, and suturing  Wagner Miguel PREOPERATIVE DIAGNOSIS:  right Small Finger flexor digitorum profundus tendon tear zone 2    POSTOPERATIVE DIAGNOSIS: right Small Finger flexor digitorum profundus tendon tearzone 2    PROCEDURE PERFORMED: right Small Finger flexor digitorum profundus repair zone 2    ANESTHESIA:  general anesthesia and local    COMPLICATIONS: None Reported    TOURNIQUET TIME:  40 minutes at 250 mmHg     DISPOSITION: Patient was sent to the PACU in stable condition  INDICATIONS:Complete laceration of the  right Small Finger finger tendon     Complications:   None     Indications for procedure:  Patient is a 77 y o  male who lacerated right Small Finger  Patient noted immediate inability to flex the right Small Finger after the injury  Patient was seen in the office with a diagnosis of right Small Finger FDP laceration  Patient was counseled on treatment options and was recommended to undergo right Small Finger FDP tendon repair  Risks and benefits of the surgery were explained to the patient he did understand and wish to proceed  Procedure and Technique:  Patient was identified in the preop screening area consent was signed and verified after identifying the correct operative site  Patient was taken back to the operating room where IV sedation was induced  Local anesthetic was administered as a digital block  Patient's right upper extremity was then prepped and draped normal fashion chlorhexidine solution  The arm was then elevated exsanguinated with an Esmarch and tourniquet placed about the brachium was inflated to 250 mg care   The Esmarch was removed  A Brunner incision was made over the right Small Finger extending from the DIP joint down to the distal palmar crease  Skin flaps were elevated off the flexor sheath and held retracted with nylon suture  The radial and ulnar neurovascular bundles were identified and were intact and kept out of harms way  The flexor tendons were identified within the flexor sheath  The flexor sheath was lacerated at the level of the A2 pulley  The A-3 pulley was elevated off the radial side and completely opened to facilitate repair  The FDS tendon slips were readily identified and found to be Intact on both the radial and ulnar sides  The FDP was completely lacerated  The distal FDP tendon stump was identified just distal to the proximal edge of the A4 pulley and could be expressed proximally with DIP joint flexion  The proximal stump of the FDP tendon was not readily grasped with a tendon retriever  A separate 1 cm incision was made at the distal palmar crease transversely to retrieve the proximal stump  Blunt dissection was taken through the fatty tissue until the proximal edge of the A1 pulley was identified  The FDP and FDS tendons were identified at this level  The proximal FDP tendon stump was identified at the level of the proximal edge of the A1 pulley  The FDP tendon was fed between the two insertion tails of the FDS to be sure it was brought through the FDS chiasm and brought through proximally to the proximal edge of the A-2 pulley  Once the tendon was pulled through the sheath and verified as gliding through the FDS chiasm freely, the FDP tendon stumps were then approximated and repaired )  The proximal and distal stumps were then approximated and repaired  The tendon was repaired with 6-stranded RUSS repair utilizing 4-0 looped FiberWire  A 6-0 Prolene suture was utilized to repair the epitenon in a running circumferential repair   The repair was tight with no gapping when tested by placing the repair under tension  The tendon repair passed easily through the intact pulleys without any significant friction  The wound was then irrigated with copious amounts saline solution  Tourniquet was released at approximately 40 minutes with good cap refill and color to all digits  The skin edges were then closed with 4-0 nylon suture interrupted simple fashion at the level of the finger and horizontal mattress fashion at the level of the palm  The apices were sutured with a 4-0 nylon corner suture  Patient tolerated the procedure well the wound was dressed in a sterile dressing and the hand was placed into a dorsal blocking splint  Patient was awake in the operating room and sent to the PACU in stable condition        I was present for the entire procedure     Patient Disposition:  PACU      SIGNATURE: Taylor Almonte MD  DATE: August 28, 2018  TIME: 4:25 PM

## 2019-08-29 ENCOUNTER — EVALUATION (OUTPATIENT)
Dept: PHYSICAL THERAPY | Facility: MEDICAL CENTER | Age: 69
End: 2019-08-29
Payer: MEDICARE

## 2019-08-29 DIAGNOSIS — S66.821D LACERATION OF FLEXOR TENDON OF HAND, RIGHT, SUBSEQUENT ENCOUNTER: Primary | ICD-10-CM

## 2019-08-29 PROCEDURE — G8984 CARRY CURRENT STATUS: HCPCS

## 2019-08-29 PROCEDURE — G8985 CARRY GOAL STATUS: HCPCS

## 2019-08-29 PROCEDURE — 97140 MANUAL THERAPY 1/> REGIONS: CPT

## 2019-08-29 PROCEDURE — L3808 WHFO, RIGID W/O JOINTS: HCPCS

## 2019-08-29 PROCEDURE — 97161 PT EVAL LOW COMPLEX 20 MIN: CPT

## 2019-08-29 NOTE — PROGRESS NOTES
PT Evaluation     Today's date: 2019  Patient name: Amaya Ramirez  : 1950  MRN: [id number]  Referring provider: Marion Cooper  Dx:   Encounter Diagnosis     ICD-10-CM    1  Laceration of flexor tendon of hand, right, subsequent encounter B32 002X Ambulatory referral to PT/OT hand therapy                  Assessment  Assessment details: Pt is a 77year old male s/p R SF FDP flexor tendon repair (19)  Redressed pt's sutures with xeroform and wrap gauze  Pt has evidence of good FDP function in SF yet limited AROM in all digits and poor tolerance to gentle passive motion to ulnar digits into flexion  Fabricated custom DBS for digits III-V, MPs at available 35-40 degrees of flexion for comfort (will reform once Pt's motion improves), wrist neutral, IP's neutral  Pt instructed on splint care/wearing schedule  Pt also instructed to perform gentle passive flexion to SF and RF and gentle place-hold while in splint  Thank you kindly for your referral    Impairments: abnormal or restricted ROM, activity intolerance, impaired physical strength and pain with function  Understanding of Dx/Px/POC: good   Prognosis: good    Goals  STG (2-3 weeks)  1: promote scar healing  2: Pt independent in HEP  3: increase passive digit flexion by 50%  4: Increase active flexion of digits by 25%    LTG (6-8 weeks)  1: Improve FOTO 10-15 pts  2: Full passive flexion of digits  3: pt able to form about 50% comp fist  4: D/C splint at 6-8 weeks s/p    Plan  Plan details: Initiate PT as per protocol     Patient would benefit from: skilled physical therapy  Planned modality interventions: thermotherapy: hydrocollator packs and cryotherapy  Other planned modality interventions: modalties once incisions close  Planned therapy interventions: manual therapy, massage, neuromuscular re-education, patient education, orthotic fitting/training, orthotic management and training, sensory integrative techniques, therapeutic activities, therapeutic exercise, home exercise program, graded activity, functional ROM exercises and fine motor coordination training  Frequency: 3x week  Duration in visits: 20  Duration in weeks: 8  Plan of Care beginning date: 2019  Plan of Care expiration date: 10/24/2019  Treatment plan discussed with: patient        Subjective Evaluation    History of Present Illness  Date of onset: 2019  Date of surgery: 2019  Mechanism of injury: Cleaning fish and hand slipped with knife blade  Went to ER  Recalls a small finger fracture many years ago            Not a recurrent problem   Quality of life: good    Pain  Current pain ratin  At best pain ratin  At worst pain rating: 10  Quality: dull ache and sharp  Relieving factors: rest  Progression: no change          Objective     Observations     Right Wrist/Hand   Positive for incision       Additional Observation Details  Sutures intact, NEI, no drainage  Horizontal suture to volar P1 of RF  Horizontal suture along A1 pulley of SF  z-pattern from P1  To P2 of SF      Neurological Testing     Sensation     Wrist/Hand     Right   Intact: light touch    Active Range of Motion     Right Digits   Flexion   Middle     MCP: 45    PIP: 65    DIP: 25  Ring     MCP: 35    PIP: 50    DIP: 15  Little     MCP: 10    PIP: 30    DIP: 20    Passive Range of Motion     Right Digits   Flexion   Ring     MCP: 55    PIP: 50    DIP: 25  Little     MCP: 40    PIP: 55    DIP: 25             Precautions: DOS 19  DOI 19      Manual              Gentle passive flexion             Gentle place-hold comp flexion                                                         Exercise Diary                                                                                                                                                                                                                                           HEP: gentle comp fist; passive flexion in DBS Modalities

## 2019-09-03 ENCOUNTER — OFFICE VISIT (OUTPATIENT)
Dept: PHYSICAL THERAPY | Facility: MEDICAL CENTER | Age: 69
End: 2019-09-03
Payer: MEDICARE

## 2019-09-03 DIAGNOSIS — S66.821D LACERATION OF FLEXOR TENDON OF HAND, RIGHT, SUBSEQUENT ENCOUNTER: Primary | ICD-10-CM

## 2019-09-03 PROCEDURE — 97140 MANUAL THERAPY 1/> REGIONS: CPT

## 2019-09-03 NOTE — PROGRESS NOTES
Daily Note     Today's date: 9/3/2019  Patient name: Kapil Donald  : 1950  MRN: [id number]  Referring provider: Sesar Shay PA-C  Dx:   Encounter Diagnosis     ICD-10-CM    1  Laceration of flexor tendon of hand, right, subsequent encounter S66 957S                   Subjective: Pt reports he may have done too much, fingers swelled up over weekend  Feels the swelling has been limiting his motion  Objective: See treatment diary below      Assessment: Tolerated treatment well  Patient exhibited good technique with therapeutic exercises and would benefit from continued PT  Redressed's Pt's incisions, sutures intact, healing well, no drainage  Once Pt's sutures are removed we should be able to address edema  Reviewed pt's program, added tenodesis and place-hold  Pt has good activation of FDP in SF  Plan: Continue per plan of care        Precautions: DOS 19  DOI 19      Manual  9/3            Gentle passive flexion 10            Gentle place-hold comp flexion 5             redressing 10             Splint adj 5             30                Exercise Diary                                                                                                                                                                                                                                           HEP: gentle comp fist; passive flexion in DBS, place-hold                                           Modalities

## 2019-09-05 ENCOUNTER — OFFICE VISIT (OUTPATIENT)
Dept: PHYSICAL THERAPY | Facility: MEDICAL CENTER | Age: 69
End: 2019-09-05
Payer: MEDICARE

## 2019-09-05 DIAGNOSIS — S66.821D LACERATION OF FLEXOR TENDON OF HAND, RIGHT, SUBSEQUENT ENCOUNTER: Primary | ICD-10-CM

## 2019-09-05 PROCEDURE — 97140 MANUAL THERAPY 1/> REGIONS: CPT

## 2019-09-05 NOTE — PROGRESS NOTES
Daily Note     Today's date: 2019  Patient name: Jorden Peterson  : 1950  MRN: [id number]  Referring provider: Stephanie Fernandez PA-C  Dx:   Encounter Diagnosis     ICD-10-CM    1  Laceration of flexor tendon of hand, right, subsequent encounter H92 728W                   Subjective: Pt reports he has pain when flexing digits especially along distal sutures  Hopefully will be getting sutures out tomorrow  Objective: See treatment diary below      Assessment: Tolerated treatment well  Patient exhibited good technique with therapeutic exercises and would benefit from continued PT Pt had low tolerance for passive flexion due to soreness around sutures  Good mobility of wrist with AA tenodesis  Pt required VC's to keep digits relaxed during tenodesis  Add STM once sutures are removed  Plan: Continue per plan of care        Precautions: DOS 19  DOI 19      Manual  9/3 9           Gentle passive flexion/tenodesis 10 10           Gentle place-hold comp flexion 5 5            redressing 10 10            Splint adj 5             30 25               Exercise Diary                                                                                                                                                                                                                                           HEP: gentle comp fist; passive flexion in DBS, place-hold                                           Modalities

## 2019-09-06 ENCOUNTER — OFFICE VISIT (OUTPATIENT)
Dept: OBGYN CLINIC | Facility: CLINIC | Age: 69
End: 2019-09-06

## 2019-09-06 VITALS
WEIGHT: 238.1 LBS | SYSTOLIC BLOOD PRESSURE: 167 MMHG | HEIGHT: 76 IN | BODY MASS INDEX: 28.99 KG/M2 | HEART RATE: 76 BPM | DIASTOLIC BLOOD PRESSURE: 103 MMHG

## 2019-09-06 DIAGNOSIS — S61.401A FLEXOR TENDON LACERATION, HAND, OPEN WOUND, RIGHT, INITIAL ENCOUNTER: Primary | ICD-10-CM

## 2019-09-06 DIAGNOSIS — S66.821A FLEXOR TENDON LACERATION, HAND, OPEN WOUND, RIGHT, INITIAL ENCOUNTER: Primary | ICD-10-CM

## 2019-09-06 PROCEDURE — 99024 POSTOP FOLLOW-UP VISIT: CPT | Performed by: PHYSICIAN ASSISTANT

## 2019-09-06 PROCEDURE — 1124F ACP DISCUSS-NO DSCNMKR DOCD: CPT | Performed by: PHYSICIAN ASSISTANT

## 2019-09-06 NOTE — PROGRESS NOTES
SUBJECTIVE:  Rohini Collins is a 77y o  year old male who presents for follow up after surgery, right small finger flexor digitorum profundus repair zone 2 performed on  8/27/2019  Today patient has been going to physical therapy and has been wearing his custom brace  He states he has some right elbow pain as well with some swelling  States he continues to bump into it which causes swelling  Today he states that the swelling is down  He has been taking anti-inflammatories as needed for the pain for his finger as well as his elbow  VITALS:  Vitals:    09/06/19 0906   BP: (!) 167/103   Pulse: 76       PHYSICAL EXAMINATION:  General: well developed and well nourished, alert, oriented times 3 and appears comfortable  Psychiatric: Normal    MUSCULOSKELETAL EXAMINATION:  Right hand  Incision: Clean, dry, with sutures intact  Range of Motion:  Normal postoperative range of motion  Neurovascular status: Neuro intact, good cap refill      STUDIES REVIEWED:  No studies reviewed  PROCEDURES PERFORMED:  Procedures  No Procedures performed today      ASSESSMENT/PLAN:    Ten days S/P right small finger flexor digitorum profundus repair zone 2  Done today: Sutures out  -  it was discussed with the patient that he will continue with physical therapy as per flexor tendon repair protocol  - he was advised to continue taking Tylenol and anti-inflammatories as needed for pain  - he will follow up in 4 weeks for re-evaluation    - it was discussed that he can apply a padded dressing and Ace wrap over his elbow for some olecranon bursitis  There is no appreciable swelling of the bursa today on exam   4 x 4 and Ace wrap was applied to the area  He was instructed to try to avoid bumping into things as well as to use padded areas when putting his elbow down  He is to give us a call if this were to increase and swelling  FOLLOW UP:  Return in about 4 weeks (around 10/4/2019)      Work/school status:  No restrictions      TO DO AT NEXT VISIT:  Re-evaluation of current issue

## 2019-09-09 ENCOUNTER — APPOINTMENT (OUTPATIENT)
Dept: PHYSICAL THERAPY | Facility: MEDICAL CENTER | Age: 69
End: 2019-09-09
Payer: MEDICARE

## 2019-09-10 ENCOUNTER — OFFICE VISIT (OUTPATIENT)
Dept: PHYSICAL THERAPY | Facility: MEDICAL CENTER | Age: 69
End: 2019-09-10
Payer: MEDICARE

## 2019-09-10 DIAGNOSIS — S66.821D LACERATION OF FLEXOR TENDON OF HAND, RIGHT, SUBSEQUENT ENCOUNTER: Primary | ICD-10-CM

## 2019-09-10 PROCEDURE — 97140 MANUAL THERAPY 1/> REGIONS: CPT

## 2019-09-10 NOTE — PROGRESS NOTES
Daily Note     Today's date: 9/10/2019  Patient name: Jorden Peterson  : 1950  MRN: [id number]  Referring provider: Stephanie Fernandez PA-C  Dx:   Encounter Diagnosis     ICD-10-CM    1  Laceration of flexor tendon of hand, right, subsequent encounter K60 675N                   Subjective: Pt reports he had his sutures removed at the doctors last Friday  One area was still too fragile so he laid stiri strips over distal incision on SF till it mends more  Developed bursitis along prominence of elbow  Objective: See treatment diary below      Assessment: Tolerated treatment well  Patient exhibited good technique with therapeutic exercises Pt still has edema in ulnar digits that limits his passive motion but he is doing well with place hold motion  Did not perform tenodesis due to elbow bursitis  Redressed hand with gauze for comfort and coban  May be able to hold off on redressing NV  Plan: Continue per plan of care        Precautions: DOS 19  DOI 19      Manual  9/3 9/5 9/10          Gentle passive flexion/tenodesis 10 10 10          Gentle place-hold comp flexion 5 5 5           redressing 10 10 5           Splint adj 5             30 25 20              Exercise Diary                                                                                                                                                                                                                                           HEP: gentle comp fist; passive flexion in DBS, place-hold                                           Modalities

## 2019-09-11 ENCOUNTER — APPOINTMENT (OUTPATIENT)
Dept: PHYSICAL THERAPY | Facility: MEDICAL CENTER | Age: 69
End: 2019-09-11
Payer: MEDICARE

## 2019-09-13 ENCOUNTER — OFFICE VISIT (OUTPATIENT)
Dept: PHYSICAL THERAPY | Facility: MEDICAL CENTER | Age: 69
End: 2019-09-13
Payer: MEDICARE

## 2019-09-13 DIAGNOSIS — S66.821D LACERATION OF FLEXOR TENDON OF HAND, RIGHT, SUBSEQUENT ENCOUNTER: Primary | ICD-10-CM

## 2019-09-13 PROCEDURE — 97140 MANUAL THERAPY 1/> REGIONS: CPT

## 2019-09-13 NOTE — PROGRESS NOTES
Daily Note     Today's date: 2019  Patient name: Raf Villar  : 1950  MRN: [id number]  Referring provider: Nidhi Cook  Dx:   Encounter Diagnosis     ICD-10-CM    1  Laceration of flexor tendon of hand, right, subsequent encounter R85 861M                   Subjective: Pt reports he has had a lot of swelling in his hand  Had to put a towel in splint for comfort  Also strap that goes around digits has been bothersome  Was cleaning wounds with alcohol  Objective: See treatment diary below      Assessment: Tolerated treatment well  Patient exhibited good technique with therapeutic exercises and would benefit from continued PT pt had increased edema to dorsum of hand and digits  Added foam padding to splint to provide good compression to offset dorsal hand edema  Pt demonstrated what he thought to be tabletop- he pressed his fingers along edge of table- quickly corrected pt and notified him he should not be doing so, that what he was doing was putting too much force on the repair  Reviewed home program with pt to ensure proper technique and safe ranges for repair  Advised Pt to add tenodesis to his home program, otherwise keep splint on at all times except for hygeine  Also advised pt to hold off from cleaning wounds with alcohol so prevent drying them out, ok to massage gently with unscented lotion  Soap and water are fine pat them dry  Pt still has good mobility and gliding of flexor tendons  Place hold pt can hold about 75% comp fist      Plan: Continue per plan of care        Precautions: DOS 19  DOI 19      Manual  /3 9/5 9/10 9/13         Gentle passive flexion/tenodesis 10 10 10 10         Gentle place-hold comp flexion 5 5 5 5          redressing 10 10 5 reviewed HEP 5          Splint adj 5   Splint adj 5          30 25 20 25             Exercise Diary HEP: gentle comp fist; passive flexion in DBS, place-hold                                           Modalities

## 2019-09-16 ENCOUNTER — OFFICE VISIT (OUTPATIENT)
Dept: PHYSICAL THERAPY | Facility: MEDICAL CENTER | Age: 69
End: 2019-09-16
Payer: MEDICARE

## 2019-09-16 DIAGNOSIS — S66.821D LACERATION OF FLEXOR TENDON OF HAND, RIGHT, SUBSEQUENT ENCOUNTER: Primary | ICD-10-CM

## 2019-09-16 PROCEDURE — 97140 MANUAL THERAPY 1/> REGIONS: CPT

## 2019-09-16 NOTE — PROGRESS NOTES
Daily Note     Today's date: 2019  Patient name: Rohini Collins  : 1950  MRN: [id number]  Referring provider: Ron Singleton  Dx:   Encounter Diagnosis     ICD-10-CM    1  Laceration of flexor tendon of hand, right, subsequent encounter S61 605J                   Subjective: Pt reported he feared his proximal incision on SF opened up so he super glued his stiri strips onto his finger      Objective: See treatment diary below      Assessment: Tolerated treatment well  Patient exhibited good technique with therapeutic exercises and would benefit from continued PT  Revealed to Pt that his incision is healed and what he though was a deep cut was actually hidden by layers of dead skin tissue  Removed dead skin layers  There was some area along ulnar border of proximal incision on SF where the skin layers weren't as easily removed  Wrapped SF in coban to protect new skin layers and provide comfort in splint  Reviewed home program with Pt  Pts place hold can maintain about 70-75% of full comp flexion for SF  Plan: Continue per plan of care        Precautions: DOS 19  DOI 19      Manual  9/3 9/5 9/10 9/13 9/16        Gentle passive flexion/tenodesis 10 10 10 10 10        Gentle place-hold comp flexion 5 5 5 5 5         redressing 10 10 5 reviewed HEP 5 5/3         Splint adj 5   Splint adj 5 2         30 25 20 25 25            Exercise Diary                                                                                                                                                                                                                                           HEP: gentle comp fist; passive flexion in DBS, place-hold                                           Modalities

## 2019-09-18 ENCOUNTER — OFFICE VISIT (OUTPATIENT)
Dept: PHYSICAL THERAPY | Facility: MEDICAL CENTER | Age: 69
End: 2019-09-18
Payer: MEDICARE

## 2019-09-18 DIAGNOSIS — S66.821D LACERATION OF FLEXOR TENDON OF HAND, RIGHT, SUBSEQUENT ENCOUNTER: Primary | ICD-10-CM

## 2019-09-18 PROCEDURE — 97140 MANUAL THERAPY 1/> REGIONS: CPT

## 2019-09-20 ENCOUNTER — OFFICE VISIT (OUTPATIENT)
Dept: PHYSICAL THERAPY | Facility: MEDICAL CENTER | Age: 69
End: 2019-09-20
Payer: MEDICARE

## 2019-09-20 DIAGNOSIS — S66.821D LACERATION OF FLEXOR TENDON OF HAND, RIGHT, SUBSEQUENT ENCOUNTER: Primary | ICD-10-CM

## 2019-09-20 PROCEDURE — 97140 MANUAL THERAPY 1/> REGIONS: CPT

## 2019-09-20 NOTE — PROGRESS NOTES
Daily Note     Today's date: 2019  Patient name: Alia Jiménez  : 1950  MRN: [id number]  Referring provider: Jorge Alberto Arzate PA-C  Dx:   Encounter Diagnosis     ICD-10-CM    1  Laceration of flexor tendon of hand, right, subsequent encounter K78 955E                   Subjective: Pt reported he felt his distal SF felt very hard  Coban helping with swelling  Objective: See treatment diary below      Assessment: Tolerated treatment well  Patient exhibited good technique with therapeutic exercises and would benefit from continued PT Incisions healing well, smoother in appearance, dead skin layer peeling off  "Hardness" pt was describing was more edema in his P1 of SF, better movement observed in flexion  PROM 80/75/60  AROM 60/50/40        Plan: Continue per plan of care        Precautions: DOS 19  DOI 19      Manual  9/3 9/5 9/10 9/13 9/16 9/18 9/20      Gentle passive flexion/tenodesis 10 10 10 10 10 12 15      Gentle place-hold comp flexion 5 5 5 5 5 5 5       redressing 10 10 5 reviewed HEP 5 5/3 Scar/retromassage 8 5       Splint adj 5   Splint adj 5 2         30 25 20 25 25 25 25          Exercise Diary                                                                                                                                                                                                                                           HEP: gentle comp fist; passive flexion in DBS, place-hold                                           Modalities

## 2019-09-23 ENCOUNTER — OFFICE VISIT (OUTPATIENT)
Dept: PHYSICAL THERAPY | Facility: MEDICAL CENTER | Age: 69
End: 2019-09-23
Payer: MEDICARE

## 2019-09-23 DIAGNOSIS — S66.821D LACERATION OF FLEXOR TENDON OF HAND, RIGHT, SUBSEQUENT ENCOUNTER: Primary | ICD-10-CM

## 2019-09-23 PROCEDURE — 97140 MANUAL THERAPY 1/> REGIONS: CPT

## 2019-09-23 PROCEDURE — 97110 THERAPEUTIC EXERCISES: CPT

## 2019-09-23 NOTE — PROGRESS NOTES
Daily Note     Today's date: 2019  Patient name: Richard Montoya  : 1950  MRN: [id number]  Referring provider: Helena Burnette PA-C  Dx:   Encounter Diagnosis     ICD-10-CM    1  Laceration of flexor tendon of hand, right, subsequent encounter E87 576P                   Subjective: Pt reported he lightly used his hand to wash his hair and during driving down to [facility] [place] over weekend  Now finger is pretty swollen  Objective: See treatment diary below      Assessment: Tolerated treatment well  Patient exhibited good technique with therapeutic exercises and would benefit from continued PT Had Pt reiterate phrase that he can " move it but not use it" for his repair  Emphasized importance of motion for the finger but to avoid use of the R hand to avoid any rupture or gapping of the repair  Encouraged pt to continue working on his motion vs  Including it in light activities  Added light grasping activities to TE program, no tension on flexors  Plan: Continue per plan of care        Precautions: DOS 19  DOI 19      Manual              Gentle passive flexion/tenodesis 5            Gentle place-hold comp flexion 10             STM 5                          20                Exercise Diary  19            Towel bunches 2 min            Cones grasping 2 min            Wrist AROM 2x10 each                                                                                                                                                                                                  HEP: gentle comp fist; passive flexion in DBS, place-hold                           10                Modalities

## 2019-09-25 ENCOUNTER — OFFICE VISIT (OUTPATIENT)
Dept: PHYSICAL THERAPY | Facility: MEDICAL CENTER | Age: 69
End: 2019-09-25
Payer: MEDICARE

## 2019-09-25 DIAGNOSIS — S66.821D LACERATION OF FLEXOR TENDON OF HAND, RIGHT, SUBSEQUENT ENCOUNTER: Primary | ICD-10-CM

## 2019-09-25 PROCEDURE — 97140 MANUAL THERAPY 1/> REGIONS: CPT

## 2019-09-25 NOTE — PROGRESS NOTES
Daily Note     Today's date: 2019  Patient name: Jasmine Ventura  : 1950  MRN: [id number]  Referring provider: Onel Brown PA-C  Dx:   Encounter Diagnosis     ICD-10-CM    1  Laceration of flexor tendon of hand, right, subsequent encounter C31 821J                   Subjective: Pt reports his finger was really swollen after last visit  Objective: See treatment diary below      Assessment: Tolerated treatment well  Patient exhibited good technique with therapeutic exercises and would benefit from continued PT Advised pt to keep wearing coban in between performing exercises, remove when performing exercises  Changed his full fist to 50%- invisible ball  Pt understood  Overall good DIP/PIP flexion in SF, edema blocking motion  Start assessing edema regularly  Plan: Continue per plan of care        Precautions: DOS 19  DOI 19      Manual             Gentle passive flexion/tenodesis 5 5           Gentle place-hold comp flexion 10 10            STM 5 10                         20 25               Exercise Diary             Towel bunches 2 min No TE           Cones grasping 2 min            Wrist AROM 2x10 each                                                                                                                                                                                                  HEP: gentle comp fist; passive flexion in DBS, place-hold                           10                Modalities

## 2019-09-27 ENCOUNTER — OFFICE VISIT (OUTPATIENT)
Dept: PHYSICAL THERAPY | Facility: MEDICAL CENTER | Age: 69
End: 2019-09-27
Payer: MEDICARE

## 2019-09-27 DIAGNOSIS — S66.821D LACERATION OF FLEXOR TENDON OF HAND, RIGHT, SUBSEQUENT ENCOUNTER: Primary | ICD-10-CM

## 2019-09-27 PROCEDURE — 97140 MANUAL THERAPY 1/> REGIONS: CPT

## 2019-09-27 NOTE — PROGRESS NOTES
Daily Note     Today's date: 2019  Patient name: Miguel A Thompson  : 1950  MRN: [id number]  Referring provider: Suzie Britt PA-C  Dx:   Encounter Diagnosis     ICD-10-CM    1  Laceration of flexor tendon of hand, right, subsequent encounter E93 099P                   Subjective: Pt reports gael is helping manage swelling in SF  Moving fingers regularly  Objective: See treatment diary below      Assessment: Tolerated treatment well  Patient exhibited good technique with therapeutic exercises and would benefit from continued PT Pt has good functioning of FDP in SF Able to form about 80% of full comp fist independently  Passive flexion SF pulp 0 7cm from pulp to [facility]  Pt declined to perform light grasping exercises  Plan: Continue per plan of care        Precautions: DOS 19  DOI 19  RTD 10/4/19      Manual            Gentle passive flexion/tenodesis 5 5 10          Gentle place-hold comp flexion 10 10 10           STM 5 10 5                        20 25 25              Exercise Diary            Towel bunches 2 min No TE No TE          Cones grasping 2 min            Wrist AROM 2x10 each                                                                                                                                                                                                  HEP: gentle comp fist; passive flexion in DBS, place-hold                           10                Modalities

## 2019-09-30 ENCOUNTER — OFFICE VISIT (OUTPATIENT)
Dept: PHYSICAL THERAPY | Facility: MEDICAL CENTER | Age: 69
End: 2019-09-30
Payer: MEDICARE

## 2019-09-30 DIAGNOSIS — S66.821D LACERATION OF FLEXOR TENDON OF HAND, RIGHT, SUBSEQUENT ENCOUNTER: Primary | ICD-10-CM

## 2019-09-30 PROCEDURE — 97140 MANUAL THERAPY 1/> REGIONS: CPT

## 2019-09-30 PROCEDURE — 97110 THERAPEUTIC EXERCISES: CPT

## 2019-09-30 NOTE — PROGRESS NOTES
Daily Note     Today's date: 2019  Patient name: Raf Villar  : 1950  MRN: [id number]  Referring provider: Adelaide Boston PA-C  Dx:   Encounter Diagnosis     ICD-10-CM    1  Laceration of flexor tendon of hand, right, subsequent encounter T03 633B                   Subjective: Pt reports he does his towel bunching at home  Coban has been helping reduce edema  Feels like the SF is lagging behind other fingers and the rest of his hand has become very weak  Objective: See treatment diary below      Assessment: Tolerated treatment well  Patient exhibited good technique with therapeutic exercises and would benefit from continued PT reduction in edema at P1 of SF; Active comp flexion 2 6cm tip of SF to [facility]  Reminded pt he still has to rest R hand and focus on his active motion regardless of the expected weakness in his hand from the inactivity over the past 6 weeks  Plan: Continue per plan of care        Precautions: DOS 19  DOI 19  RTD 10/4/19      Manual           Gentle passive flexion/tenodesis 5 5 10 5         Gentle place-hold comp flexion 10 10 10 12          STM 5 10 5 8                       20 25 25 25             Exercise Diary           Towel bunches 2 min No TE No TE 10x         Cones grasping 2 min   Pegs grasping 3 min         Wrist AROM 2x10 each   2x10                                                                                                                                                                                               HEP: gentle comp fist; passive flexion in DBS, place-hold                           10   10             Modalities

## 2019-10-02 ENCOUNTER — OFFICE VISIT (OUTPATIENT)
Dept: PHYSICAL THERAPY | Facility: MEDICAL CENTER | Age: 69
End: 2019-10-02
Payer: MEDICARE

## 2019-10-02 DIAGNOSIS — S66.821D LACERATION OF FLEXOR TENDON OF HAND, RIGHT, SUBSEQUENT ENCOUNTER: Primary | ICD-10-CM

## 2019-10-02 PROCEDURE — 97140 MANUAL THERAPY 1/> REGIONS: CPT

## 2019-10-02 NOTE — PROGRESS NOTES
PT Re-Evaluation     Today's date: 10/2/2019  Patient name: Karin Garnett  : 1950  MRN: [id number]  Referring provider: Sebastian Wylie  Dx:   Encounter Diagnosis     ICD-10-CM    1  Laceration of flexor tendon of hand, right, subsequent encounter S66 821D                   Assessment  Assessment details: Pt has been progressing well with therapy  His incisions have been healing well with mild scar fibrosis along palmar incision  Pt has gained significant mobility in small finger and is approaching about 75-80% full composite flexion  He still has some residual edema to his proximal small finger but we have been working to manage with coban wrapping in between exercises  Overall he has good gliding of tendon repair nd may be ready for D/C splint and progressing to light grasping activities and light resistive in a few more weeks  Thank you  Impairments: abnormal or restricted ROM, activity intolerance, impaired physical strength and pain with function  Barriers to therapy: Pt traveling to [facility] end of Oct  Understanding of Dx/Px/POC: good   Prognosis: good    Goals  STG (2-3 weeks)  1: promote scar healing- met  2: Pt independent in HEP- met  3: increase passive digit flexion by 50%- met  4:  Increase active flexion of digits by 25%- met    LTG (6-8 weeks)  1: Improve FOTO 10-15 pts- not assessed  2: Full passive flexion of digits- partially met  3: pt able to form about 50% comp fist- met  4: D/C splint at 6-8 weeks s/p- not met    Plan  Plan details: Continue PT as per POC/protocol  Patient would benefit from: skilled physical therapy  Planned modality interventions: thermotherapy: hydrocollator packs and cryotherapy  Other planned modality interventions: modalties once incisions close  Planned therapy interventions: manual therapy, massage, neuromuscular re-education, patient education, orthotic fitting/training, orthotic management and training, sensory integrative techniques, therapeutic activities, therapeutic exercise, home exercise program, graded activity, functional ROM exercises and fine motor coordination training  Frequency: 3x week  Duration in visits: 12  Duration in weeks: 4  Plan of Care beginning date: 10/2/2019  Plan of Care expiration date: 10/31/2019  Treatment plan discussed with: patient        Subjective Evaluation    History of Present Illness  Date of onset: 2019  Date of surgery: 2019  Mechanism of injury: Cleaning fish and hand slipped with knife blade  Went to ER  Recalls a small finger fracture many years ago  Re-eval: Pt reports swelling sets in usually after exercising the finger, has been working to control swelling with coban at home  Feels like the hand has gotten really weak from not using it  Not a recurrent problem   Quality of life: good    Pain  Current pain ratin  At best pain ratin  At worst pain ratin  Quality: dull ache and sharp  Relieving factors: rest  Progression: improved    Social Support    Employment status: not working  Hand dominance: right    Patient Goals  Patient goals for therapy: decreased edema, decreased pain, increased strength, independence with ADLs/IADLs, return to sport/leisure activities and increased motion          Objective     Observations     Right Wrist/Hand   Positive for edema and incision       Additional Observation Details        Neurological Testing     Sensation     Wrist/Hand     Right   Intact: light touch    Active Range of Motion     Right Wrist   Wrist flexion: 60 degrees   Wrist extension: 35 degrees     Right Digits   Flexion   Ring     MCP: 75    PIP: 85    DIP: 40  Little     MCP: 80    PIP: 60    DIP: 35    Additional Active Range of Motion Details  Wrist extension fingers relaxed  Passive wrist extension 55  Active comp flexion pulp to [facility]-  0 cm    Passive Range of Motion     Right Digits   Flexion   Ring     MCP: 85    PIP: 90    DIP: 55  Little     MCP: 90    PIP: 75    DIP: 70    Swelling     Left Wrist/Hand   Little     Proximal: 7 cm    Middle: 5 8 cm    Right Wrist/Hand   Little     Proximal: 7 5 cm    Middle: 6 6 cm             Precautions: DOS 8/27/19  DOI 8/22/19    RTD 10/4/19      Manual  9/23 9/25 9/27 9/30 10/2        Gentle passive flexion/tenodesis 5 5 10 5 5        Gentle place-hold comp flexion 10 10 10 12 10         STM 5 10 5 8 8             reassessment 5         20 25 25 25 28            Exercise Diary  9/23 9/25 9/27 9/30 10/2        Towel bunches 2 min No TE No TE 10x 10        Cones grasping 2 min   Pegs grasping 3 min 3 min        Wrist AROM 2x10 each   2x10 np                                                                                                                                                                                              HEP: gentle comp fist; passive flexion in DBS, place-hold                           10   10 8            Modalities

## 2019-10-04 ENCOUNTER — OFFICE VISIT (OUTPATIENT)
Dept: OBGYN CLINIC | Facility: CLINIC | Age: 69
End: 2019-10-04

## 2019-10-04 VITALS
BODY MASS INDEX: 29.06 KG/M2 | WEIGHT: 238.6 LBS | HEIGHT: 76 IN | SYSTOLIC BLOOD PRESSURE: 159 MMHG | DIASTOLIC BLOOD PRESSURE: 101 MMHG | HEART RATE: 69 BPM

## 2019-10-04 DIAGNOSIS — S61.401A FLEXOR TENDON LACERATION, HAND, OPEN WOUND, RIGHT, INITIAL ENCOUNTER: Primary | ICD-10-CM

## 2019-10-04 DIAGNOSIS — S66.821A FLEXOR TENDON LACERATION, HAND, OPEN WOUND, RIGHT, INITIAL ENCOUNTER: Primary | ICD-10-CM

## 2019-10-04 PROCEDURE — 99024 POSTOP FOLLOW-UP VISIT: CPT | Performed by: ORTHOPAEDIC SURGERY

## 2019-10-04 NOTE — PROGRESS NOTES
CHIEF COMPLAINT:  Chief Complaint   Patient presents with    Right Little Finger - Post-op       SUBJECTIVE:  Shan uSazo is a 77y o  year old  male who presents  for follow up after surgery, right small finger flexor digitorum profundus repair zone 2 performed on  8/27/2019  Pt has been wearing splint as directed  Pt has some continued minimal fullness,       PAST MEDICAL HISTORY:  Past Medical History:   Diagnosis Date    Abnormal chest CT     RESOLVED: [id number]    Balance disorder     RESOLVED: [id number]    Colon polyp     Lumbar radiculopathy     RESOLVED: [id number]    Lumbar spondylosis     RESOLVED: [id number]    Melanoma ([address] )        PAST SURGICAL HISTORY:  Past Surgical History:   Procedure Laterality Date    CHOLECYSTECTOMY      COLONOSCOPY      [place] REPAIR FLEXOR TENDON,HAND,W/O GRAFT,EA Right 8/27/2019    Procedure: REPAIR FLEXOR TENDON right small FINGER;  Surgeon: Letha Stuart MD;  [facility];  Service: Orthopedics    TONSILLECTOMY      TOTAL HIP ARTHROPLASTY Left 06/2016       FAMILY HISTORY:  Family History   Problem Relation Age of Onset    Hypertension Father     Arthritis Family     Colon polyps Family        SOCIAL HISTORY:  Social History     Tobacco Use    Smoking status: Current Every Day Smoker     Packs/day: 1 00    Smokeless tobacco: Never Used   Substance Use Topics    Alcohol use: Yes     Frequency: 4 or more times a week     Drinks per session: 1 or 2     Comment: DAILY, liquor    Drug use: Not Currently       MEDICATIONS:    Current Outpatient Medications:     allopurinol (ZYLOPRIM) 100 mg tablet, TAKE 1 TO 3 TABLETS BY MOUTH DAILY AS DIRECTED FOR GOUT (Patient taking differently: TAKE 1 TABLETS BY MOUTH DAILY AS DIRECTED FOR GOUT), Disp: 270 tablet, Rfl: 3    Ascorbic Acid (VITAMIN C) 1000 MG tablet, Take 1 tablet by mouth daily, Disp: , Rfl:     EPINEPHrine (EPIPEN) 0 3 mg/0 3 mL SOAJ, Inject 0 3 mL (0 3 mg total) into a muscle once for 1 dose As needed for allergic reaction, Disp: 0 3 mL, Rfl: 0    ALLERGIES:  Allergies   Allergen Reactions    Atorvastatin Myalgia    Venomil Honey Bee Venom [Honey Bee Venom] Swelling       REVIEW OF SYSTEMS:  Review of Systems   Constitutional: Negative for chills, fever and unexpected weight change  HENT: Negative for hearing loss, nosebleeds and sore throat  Eyes: Negative for pain, redness and visual disturbance  Respiratory: Negative for cough, shortness of breath and wheezing  Cardiovascular: Negative for chest pain, palpitations and leg swelling  Gastrointestinal: Negative for abdominal pain, nausea and vomiting  Endocrine: Negative for polydipsia and polyuria  Genitourinary: Negative for dysuria and hematuria  Skin: Negative for rash and wound  Neurological: Negative for dizziness, light-headedness and headaches  Psychiatric/Behavioral: Negative for decreased concentration, dysphoric mood and suicidal ideas  The patient is not nervous/anxious          VITALS:  Vitals:    10/04/19 0859   BP: (!) 159/101   Pulse: 69       LABS:  HgA1c: No results found for: HGBA1C  BMP:   Lab Results   Component Value Date    GLUCOSE 95 10/08/2018    CALCIUM 9 9 10/08/2018     12/18/2015    K 4 7 10/08/2018    CO2 26 10/08/2018     10/08/2018    BUN 18 10/08/2018    CREATININE 0 92 10/08/2018       _____________________________________________________  PHYSICAL EXAMINATION:  General: well developed and well nourished, alert, oriented times 3 and appears comfortable  Psychiatric: Normal  HEENT: Trachea Midline, No torticollis  Pulmonary: No audible wheezing or respiratory distress   Skin: No masses, erythema, lacerations, fluctation, ulcerations  Neurovascular: Sensation Intact to the Median, Ulnar, Radial Nerve, Motor Intact to the Median, Ulnar, Radial Nerve and Pulses Intact    MUSCULOSKELETAL EXAMINATION:  Right small finger   Minimal fullness noted  FDP functionally intact ___________________________________________________  STUDIES REVIEWED:  No studies reviewed  PROCEDURES PERFORMED:  Procedures  No Procedures performed today    _____________________________________________________  ASSESSMENT/PLAN:  S/P right small finger flexor digitorum profundus repair zone 2  * Pt was advised to continue splint  For the next 2 weeks  * in 2 weeks Pt will begin to work on motion at home and motion and strength at therapy   * Pt will follow up in 6 weeks where we will assess motion and strength and likely PRN patient        Follow Up:  Return if symptoms worsen or fail to improve        To Do Next Visit:  Re-evaluation of current issue          Scribe Attestation    I,:   Lino Ríos am acting as a scribe while in the presence of the attending physician :        I,:   Diana Recio MD personally performed the services described in this documentation    as scribed in my presence :

## 2019-10-07 ENCOUNTER — OFFICE VISIT (OUTPATIENT)
Dept: PHYSICAL THERAPY | Facility: MEDICAL CENTER | Age: 69
End: 2019-10-07
Payer: MEDICARE

## 2019-10-07 DIAGNOSIS — S66.821D LACERATION OF FLEXOR TENDON OF HAND, RIGHT, SUBSEQUENT ENCOUNTER: Primary | ICD-10-CM

## 2019-10-07 PROCEDURE — 97140 MANUAL THERAPY 1/> REGIONS: CPT

## 2019-10-07 NOTE — PROGRESS NOTES
Daily Note     Today's date: 10/7/2019  Patient name: Mikaela Whitaker  : 1950  MRN: [id number]  Referring provider: Viri Aparicio PA-C  Dx:   Encounter Diagnosis     ICD-10-CM    1  Laceration of flexor tendon of hand, right, subsequent encounter B78 423L                   Subjective: Pt saw Dr , he was made clear of the precautions he needs to follow regarding his repair  Objective: See treatment diary below      Assessment: Tolerated treatment well  Patient exhibited good technique with therapeutic exercises and would benefit from continued PT Pt's ROM stable  reminded Pt to form fist by about 50%, imaginary ball in hand  Pt showed understanding  Plan: Continue per plan of care        Precautions: DOS 19  DOI 19    RTD 10/4/19      Manual  9/23 9/25 9/27 9/30 10/2 10/7       Gentle passive flexion/tenodesis 5 5 10 5 5 5       Gentle /2 comp flexion 10 10 10 12 10 5        STM 5 10 5 8 8 5            reassessment 5         20 25 25 25 28 15           Exercise Diary  9/23 9/25 9/27 9/30 10/2 10/7       Towel bunches 2 min No TE No TE 10x 10 2 min       Cones grasping 2 min   Pegs grasping 3 min 3 min 3 min       Wrist AROM 2x10 each   2x10 np                                                                                                                                                                                              HEP: gentle comp fist; passive flexion in DBS, place-hold                           10   10 8 5           Modalities

## 2019-10-09 ENCOUNTER — OFFICE VISIT (OUTPATIENT)
Dept: PHYSICAL THERAPY | Facility: MEDICAL CENTER | Age: 69
End: 2019-10-09
Payer: MEDICARE

## 2019-10-09 DIAGNOSIS — S66.821D LACERATION OF FLEXOR TENDON OF HAND, RIGHT, SUBSEQUENT ENCOUNTER: Primary | ICD-10-CM

## 2019-10-09 PROCEDURE — 97140 MANUAL THERAPY 1/> REGIONS: CPT

## 2019-10-09 NOTE — PROGRESS NOTES
Daily Note     Today's date: 10/9/2019  Patient name: Elizabeth Rios  : 1950  MRN: [id number]  Referring provider: Geraldine oRberts PA-C            Subjective: Pt has to cancel next week, going out of town  Objective: See treatment diary below      Assessment: Tolerated treatment well  Patient exhibited good technique with therapeutic exercises and would benefit from continued PT added MH to R hand to help promote better passive flexion, kept roll on hot pack to keep digits slightly flexed  Able to passively touch pulp to [facility] post manuals  Active hook PIP 70; DIP 45  Table top  MCP  65      Plan: Continue per plan of care        Precautions: DOS 19  DOI 19    RTD 10/4/19      Manual  9/23 9/25 9/27 9/30 10/2 10/7 10/9      Gentle passive flexion/tenodesis 5 5 10 5 5 5 5      Gentle  comp flexion 10 10 10 12 10 5 5       STM 5 10 5 8 8 5 5           reassessment 5         20 25 25 25 28 15 15          Exercise Diary  9/23 9/25 9/27 9/30 10/2 10/7 10/9      Towel bunches 2 min No TE No TE 10x 10 2 min 2 min      Cones grasping 2 min   Pegs grasping 3 min 3 min 3 min 3 min      Wrist AROM 2x10 each   2x10 np                                                                                                                                                                                              HEP: gentle comp fist; passive flexion in DBS, place-hold                           10   10 8 5 5          Modalities        10/9      MH with ace bandage horiz under pack to keep digits flexed       10 min

## 2019-10-11 ENCOUNTER — OFFICE VISIT (OUTPATIENT)
Dept: PHYSICAL THERAPY | Facility: MEDICAL CENTER | Age: 69
End: 2019-10-11
Payer: MEDICARE

## 2019-10-11 DIAGNOSIS — S66.821D LACERATION OF FLEXOR TENDON OF HAND, RIGHT, SUBSEQUENT ENCOUNTER: Primary | ICD-10-CM

## 2019-10-11 PROCEDURE — 97140 MANUAL THERAPY 1/> REGIONS: CPT

## 2019-10-11 NOTE — PROGRESS NOTES
Daily Note     Today's date: 10/11/2019  Patient name: Rachel Glass  : 1950  MRN: [id number]  Referring provider: Vanita Lima PA-C  Dx:   Encounter Diagnosis     ICD-10-CM    1  Laceration of flexor tendon of hand, right, subsequent encounter I21 097J                   Subjective: Pt eager to get rid of splint  [address] application last visit really helped reduce pain even post session  Objective: See treatment diary below      Assessment: Tolerated treatment well  Patient exhibited good technique with therapeutic exercises and would benefit from continued PT Advised him to keep on according to Dr's orders, 8 weeks s/p which will be 10/22  Pt's ROM still exceptional, no lag in flexor tendon in controlled motion  Plan: Continue per plan of care        Precautions: DOS 19  DOI 19  D/C splint 10/22    RTD 10/4/19      Manual  9/23 9/25 9/27 9/30 10/2 10/7 10/9 10/11     Gentle passive flexion/tenodesis 5 5 10 5 5 5 5 5     Gentle  comp flexion 10 10 10 12 10 5 5 5      STM 5 10 5 8 8 5 5 5          reassessment 5         20 25 25 25 28 15 15 15         Exercise Diary  9/23 9/25 9/27 9/30 10/2 10/7 10/9 10/11     Towel bunches 2 min No TE No TE 10x 10 2 min 2 min 2 min     Cones grasping 2 min   Pegs grasping 3 min 3 min 3 min 3 min 3 min     Wrist AROM 2x10 each   2x10 np                                                                                                                                                                                              HEP: gentle comp fist; passive flexion in DBS, place-hold                           10   10 8 5 5 5         Modalities        10/9 10/11     MH with ace bandage horiz under pack to keep digits flexed       10 min 10 min

## 2019-10-14 ENCOUNTER — APPOINTMENT (OUTPATIENT)
Dept: PHYSICAL THERAPY | Facility: MEDICAL CENTER | Age: 69
End: 2019-10-14
Payer: MEDICARE

## 2019-10-16 ENCOUNTER — APPOINTMENT (OUTPATIENT)
Dept: PHYSICAL THERAPY | Facility: MEDICAL CENTER | Age: 69
End: 2019-10-16
Payer: MEDICARE

## 2019-10-18 ENCOUNTER — APPOINTMENT (OUTPATIENT)
Dept: PHYSICAL THERAPY | Facility: MEDICAL CENTER | Age: 69
End: 2019-10-18
Payer: MEDICARE

## 2019-10-21 ENCOUNTER — OFFICE VISIT (OUTPATIENT)
Dept: PHYSICAL THERAPY | Facility: MEDICAL CENTER | Age: 69
End: 2019-10-21
Payer: MEDICARE

## 2019-10-21 DIAGNOSIS — S66.821D LACERATION OF FLEXOR TENDON OF HAND, RIGHT, SUBSEQUENT ENCOUNTER: Primary | ICD-10-CM

## 2019-10-21 PROCEDURE — 97140 MANUAL THERAPY 1/> REGIONS: CPT

## 2019-10-21 PROCEDURE — 97010 HOT OR COLD PACKS THERAPY: CPT

## 2019-10-21 NOTE — PROGRESS NOTES
Daily Note     Today's date: 10/21/2019  Patient name: Juan Paredes  : 1950  MRN: [id number]  Referring provider: Sulaiman Kim PA-C  Dx:   Encounter Diagnosis     ICD-10-CM    1  Laceration of flexor tendon of hand, right, subsequent encounter Y66 451S                   Subjective: Pt reports finger is feeling good, much softer in texture  Objective: See treatment diary below      Assessment: Tolerated treatment well  Patient exhibited good technique with therapeutic exercises and would benefit from continued PT Pt has mild lag in SF comp flexion  Pt reports he does enough with his hand, refused to do [place]  Full passive comp flexion  Advised pt to keep activities light  Plan: Continue per plan of care        Precautions: DOS 19  DOI 19  D/C splint 10/22    RTD 11/15/19      Manual  9/23 9/25 9/27 9/30 10/2 10/7 10/9 10/11 10/21    Gentle passive flexion/tenodesis 5 5 10 5 5 5 5 5 5    Gentle  comp flexion 10 10 10 12 10 5 5 5 5     STM 5 10 5 8 8 5 5 5 5         reassessment 5         20 25 25 25 28 15 15 15 15        Exercise Diary  9/23 9/25 9/27 9/30 10/2 10/7 10/9 10/11 10/21    Towel bunches 2 min No TE No TE 10x 10 2 min 2 min 2 min No TE    Cones grasping 2 min   Pegs grasping 3 min 3 min 3 min 3 min 3 min     Wrist AROM 2x10 each   2x10 np                                                                                                                                                                                              HEP: gentle comp fist; passive flexion in DBS, place-hold                           10   10 8 5 5 5         Modalities        10/9 10/11 10/21    MH with ace bandage horiz under pack to keep digits flexed       10 min 10 min 10 min

## 2019-10-23 ENCOUNTER — OFFICE VISIT (OUTPATIENT)
Dept: PHYSICAL THERAPY | Facility: MEDICAL CENTER | Age: 69
End: 2019-10-23
Payer: MEDICARE

## 2019-10-23 DIAGNOSIS — S66.821D LACERATION OF FLEXOR TENDON OF HAND, RIGHT, SUBSEQUENT ENCOUNTER: Primary | ICD-10-CM

## 2019-10-23 PROCEDURE — G8985 CARRY GOAL STATUS: HCPCS

## 2019-10-23 PROCEDURE — G8984 CARRY CURRENT STATUS: HCPCS

## 2019-10-23 PROCEDURE — 97140 MANUAL THERAPY 1/> REGIONS: CPT

## 2019-10-23 NOTE — PROGRESS NOTES
Daily Note     Today's date: 10/23/2019  Patient name: Yudy Perry  : 1950  MRN: [id number]  Referring provider: Ashli Sevilla PA-C  Dx:   Encounter Diagnosis     ICD-10-CM    1  Laceration of flexor tendon of hand, right, subsequent encounter F49 023U                   Subjective: Pt reports his finger feels about the same  No added soreness  Objective: See treatment diary below      Assessment: Tolerated treatment well  Patient exhibited good technique with therapeutic exercises and would benefit from continued PT  Pt concerned with continued scar tissue in P1 f SF limiting his motion   AROM   PROM tip to palm, about 1 cm from [facility]  MCP 75  PIP 80  DIP 40  ROM continuing to improve  No pain with light strengthening exercises initiated  Plan: Continue per plan of care        Precautions: DOS 19  DOI 19  D/C splint 10/22    RTD 11/15/19      Manual  9/23 9/25 9/27 9/30 10/2 10/7 10/9 10/11 10/21 10/23   Gentle passive flexion/tenodesis 5 5 10 5 5 5 5 5 5 5   Gentle  comp flexion 10 10 10 12 10 5 5 5 5 5    STM 5 10 5 8 8 5 5 5 5 5        reassessment 5         20 25 25 25 28 15 15 15 15 15       Exercise Diary  9/23 9/25 9/27 9/30 10/2 10/7 10/9 10/11 10/21 10/23   Towel bunches 2 min No TE No TE 10x 10 2 min 2 min 2 min No TE    Cones grasping 2 min   Pegs grasping 3 min 3 min 3 min 3 min 3 min     Wrist AROM 2x10 each   2x10 np        flexbar towel twist          yellow 20x   fingerweb digit flexion          yellow 20x   Hand helper          1 blue 1 red 20x                                                                                                                                                  HEP: gentle comp fist; passive flexion in DBS, place-hold                           10   10 8 5 5 5  10 (uncharged)       Modalities        10/9 10/11 10/21 10/23   MH with ace bandage horiz under pack to keep digits flexed       10 min 10 min 10 min 10 min

## 2019-10-25 ENCOUNTER — OFFICE VISIT (OUTPATIENT)
Dept: PHYSICAL THERAPY | Facility: MEDICAL CENTER | Age: 69
End: 2019-10-25
Payer: MEDICARE

## 2019-10-25 ENCOUNTER — OFFICE VISIT (OUTPATIENT)
Dept: FAMILY MEDICINE CLINIC | Facility: CLINIC | Age: 69
End: 2019-10-25
Payer: MEDICARE

## 2019-10-25 VITALS
HEART RATE: 64 BPM | SYSTOLIC BLOOD PRESSURE: 120 MMHG | OXYGEN SATURATION: 97 % | BODY MASS INDEX: 29.83 KG/M2 | DIASTOLIC BLOOD PRESSURE: 70 MMHG | WEIGHT: 245 LBS | RESPIRATION RATE: 18 BRPM | HEIGHT: 76 IN

## 2019-10-25 DIAGNOSIS — M1A.0720 IDIOPATHIC CHRONIC GOUT OF LEFT ANKLE WITHOUT TOPHUS: ICD-10-CM

## 2019-10-25 DIAGNOSIS — Z12.5 PROSTATE CANCER SCREENING: ICD-10-CM

## 2019-10-25 DIAGNOSIS — Z72.0 TOBACCO ABUSE: ICD-10-CM

## 2019-10-25 DIAGNOSIS — I10 ESSENTIAL HYPERTENSION: Primary | ICD-10-CM

## 2019-10-25 DIAGNOSIS — R01.1 HEART MURMUR: ICD-10-CM

## 2019-10-25 DIAGNOSIS — E78.00 PURE HYPERCHOLESTEROLEMIA: ICD-10-CM

## 2019-10-25 DIAGNOSIS — Z23 FLU VACCINE NEED: ICD-10-CM

## 2019-10-25 DIAGNOSIS — C43.9 MALIGNANT MELANOMA OF SKIN (HCC): ICD-10-CM

## 2019-10-25 DIAGNOSIS — I71.2 ANEURYSM OF ASCENDING AORTA (HCC): ICD-10-CM

## 2019-10-25 DIAGNOSIS — M75.51 SUBACROMIAL BURSITIS OF RIGHT SHOULDER JOINT: ICD-10-CM

## 2019-10-25 DIAGNOSIS — S66.821D LACERATION OF FLEXOR TENDON OF HAND, RIGHT, SUBSEQUENT ENCOUNTER: Primary | ICD-10-CM

## 2019-10-25 PROBLEM — R97.20 RISING PSA LEVEL: Status: RESOLVED | Noted: 2017-10-12 | Resolved: 2019-10-25

## 2019-10-25 PROCEDURE — 90662 IIV NO PRSV INCREASED AG IM: CPT | Performed by: FAMILY MEDICINE

## 2019-10-25 PROCEDURE — G0008 ADMIN INFLUENZA VIRUS VAC: HCPCS | Performed by: FAMILY MEDICINE

## 2019-10-25 PROCEDURE — 97140 MANUAL THERAPY 1/> REGIONS: CPT

## 2019-10-25 PROCEDURE — 20610 DRAIN/INJ JOINT/BURSA W/O US: CPT | Performed by: FAMILY MEDICINE

## 2019-10-25 PROCEDURE — 99214 OFFICE O/P EST MOD 30 MIN: CPT | Performed by: FAMILY MEDICINE

## 2019-10-25 RX ADMIN — TRIAMCINOLONE ACETONIDE 40 MG: 40 INJECTION, SUSPENSION INTRA-ARTICULAR; INTRAMUSCULAR at 13:33

## 2019-10-25 RX ADMIN — LIDOCAINE HYDROCHLORIDE 1 ML: 10 INJECTION, SOLUTION INFILTRATION; PERINEURAL at 13:33

## 2019-10-25 NOTE — PROGRESS NOTES
Assessment/Plan:       Problem List Items Addressed This Visit        Cardiovascular and Mediastinum    Aneurysm of ascending aorta (HCC)    Relevant Orders    TSH, 3rd generation with Free T4 reflex (Completed)    Echo complete with contrast if indicated    CT chest wo contrast    Essential hypertension - Primary    Relevant Orders    CBC and differential (Completed)    Comprehensive metabolic panel (Completed)    Lipid Panel with Direct LDL reflex (Completed)    TSH, 3rd generation with Free T4 reflex (Completed)       Musculoskeletal and Integument    Idiopathic chronic gout of left ankle without tophus    Relevant Orders    Uric acid (Completed)    Malignant melanoma of skin ([facility] [address] )     He follows with derm          Subacromial bursitis of right shoulder joint    Relevant Medications    lidocaine (XYLOCAINE) 1 % injection 1 mL (Completed)    triamcinolone acetonide (KENALOG-40) 40 mg/mL injection 40 mg (Completed)    Other Relevant Orders    Large joint arthrocentesis: R subacromial bursa (Completed)       Other    Heart murmur    Relevant Orders    Echo complete with contrast if indicated    Pure hypercholesterolemia    Relevant Orders    TSH, 3rd generation with Free T4 reflex (Completed)    Tobacco abuse      Other Visit Diagnoses     Flu vaccine need        Relevant Orders    influenza vaccine, high-dose, PF 0 5 mL (Completed)    Prostate cancer screening        Relevant Orders    PSA, Total Screen (Completed)        Large joint arthrocentesis: R subacromial bursa  Date/Time: 10/25/2019 1:33 PM  Consent given by: patient  Supporting Documentation  Indications: pain   Procedure Details  Location: shoulder - R subacromial bursa  Needle size: 22 G  Approach: posterior  Medications administered: 1 mL lidocaine 1 %; 40 mg triamcinolone acetonide 40 mg/mL    Patient tolerance: patient tolerated the procedure well with no immediate complications  Dressing:  Sterile dressing applied           BMI Counseling:  Body mass index is 29 82 kg/m²  The BMI is above normal  Nutrition recommendations include decreasing portion sizes  Exercise recommendations include moderate physical activity 150 minutes/week  Tobacco Cessation Counseling: Tobacco cessation counseling was provided  The patient is sincerely urged to quit consumption of tobacco  He is ready to quit tobacco  Medication options and side effects of medication discussed  Patient agreed to medication  Nicotine patch was prescribed  Subjective:      Patient ID: Gabriele Saleh is a 68 y o  male  HPI   Patient presents today for follow-up for chronic health issues  Fortunately, he has been scaling back on his smoking  Does have a history of an ascending aortic aneurysm  He denies any current problems chest pain, shortness of breath or palpitations  He has history of gout but has had no recurrent issues  He has history of hypertension and tolerates medications without significant lightheadedness  He has some chronic pain in his right shoulder  He notes he had relief from injection in the past and requests another injection today  The following portions of the patient's history were reviewed and updated as appropriate: allergies, current medications, past family history, past medical history, past social history, past surgical history and problem list       Current Outpatient Medications:     allopurinol (ZYLOPRIM) 100 mg tablet, TAKE 1 TO 3 TABLETS BY MOUTH DAILY AS DIRECTED FOR GOUT (Patient taking differently: TAKE 1 TABLETS BY MOUTH DAILY AS DIRECTED FOR GOUT), Disp: 270 tablet, Rfl: 3    Ascorbic Acid (VITAMIN C) 1000 MG tablet, Take 1 tablet by mouth daily, Disp: , Rfl:     EPINEPHrine (EPIPEN) 0 3 mg/0 3 mL SOAJ, Inject 0 3 mL (0 3 mg total) into a muscle once for 1 dose As needed for allergic reaction, Disp: 0 3 mL, Rfl: 0     Review of Systems   Constitutional: Negative for appetite change, chills, fatigue, fever and unexpected weight change     HENT: Negative for trouble swallowing  Eyes: Negative for visual disturbance  Respiratory: Negative for cough, chest tightness, shortness of breath and wheezing  Cardiovascular: Negative for chest pain  Gastrointestinal: Negative for abdominal distention, abdominal pain, blood in stool, constipation and diarrhea  Endocrine: Negative for polyuria  Genitourinary: Negative for difficulty urinating and flank pain  Musculoskeletal: Positive for arthralgias  Negative for myalgias  Skin: Negative for rash  Neurological: Negative for dizziness and light-headedness  Hematological: Negative for adenopathy  Does not bruise/bleed easily  Psychiatric/Behavioral: Negative for sleep disturbance  Objective:      /70   Pulse 64   Resp 18   Ht 6' 4" (1 93 m)   Wt 111 kg (245 lb)   SpO2 97%   BMI 29 82 kg/m²          Physical Exam   Constitutional: He is oriented to person, place, and time  He appears well-developed and well-nourished  No distress  HENT:   Head: Normocephalic  Eyes: Pupils are equal, round, and reactive to light  Right eye exhibits no discharge  Left eye exhibits no discharge  Neck: No tracheal deviation present  No thyromegaly present  Cardiovascular: Normal rate and regular rhythm  Murmur heard  Pulmonary/Chest: Effort normal  No respiratory distress  He has no wheezes  He has no rales  Abdominal: Soft  He exhibits no distension  There is no tenderness  Musculoskeletal: Normal range of motion  He exhibits no edema  Lymphadenopathy:     He has no cervical adenopathy  Neurological: He is alert and oriented to person, place, and time  No cranial nerve deficit  Skin: Skin is warm  He is not diaphoretic  No erythema  Lipoma R post shoulder   Psychiatric: He has a normal mood and affect   Judgment and thought content normal          Loida Guevara MD

## 2019-10-25 NOTE — PROGRESS NOTES
Daily Note     Today's date: 10/25/2019  Patient name: Raul Estrada  : 1950  MRN: [id number]  Referring provider: Shraddha Kevin PA-C  Dx:   Encounter Diagnosis     ICD-10-CM    1  Laceration of flexor tendon of hand, right, subsequent encounter [id number]                   Subjective: Pt reports his fnger swelled up after last visit, Was using heat and ice to help calm it down  Much better today  Objective: See treatment diary below      Assessment: Tolerated treatment well  Pt declined TE's  ROM stable able to hold 80% comp fist with SF; good FDP glide  Possibly two more visits till Pt leaves for [place]    Plan: Continue per plan of care        Precautions: DOS 19  DOI 19  D/C splint 10/22    RTD 11/15/19      Manual  10/25            Gentle passive flexion/tenodesis 5            Gentle 1/2 comp flexion 5             STM 5                          15                Exercise Diary  10/25            Towel bunches No TE            Cones grasping             Wrist AROM             flexbar towel twist             fingerweb digit flexion             Hand helper                                                                                                                                                            HEP: gentle comp fist; passive flexion in DBS, place-hold                                           Modalities  10/25            MH 10 min

## 2019-10-27 RX ORDER — TRIAMCINOLONE ACETONIDE 40 MG/ML
40 INJECTION, SUSPENSION INTRA-ARTICULAR; INTRAMUSCULAR
Status: COMPLETED | OUTPATIENT
Start: 2019-10-25 | End: 2019-10-25

## 2019-10-27 RX ORDER — LIDOCAINE HYDROCHLORIDE 10 MG/ML
1 INJECTION, SOLUTION INFILTRATION; PERINEURAL
Status: COMPLETED | OUTPATIENT
Start: 2019-10-25 | End: 2019-10-25

## 2019-10-28 ENCOUNTER — APPOINTMENT (OUTPATIENT)
Dept: LAB | Facility: CLINIC | Age: 69
End: 2019-10-28
Payer: MEDICARE

## 2019-10-28 ENCOUNTER — TRANSITIONAL CARE MANAGEMENT (OUTPATIENT)
Dept: FAMILY MEDICINE CLINIC | Facility: CLINIC | Age: 69
End: 2019-10-28

## 2019-10-28 ENCOUNTER — OFFICE VISIT (OUTPATIENT)
Dept: PHYSICAL THERAPY | Facility: MEDICAL CENTER | Age: 69
End: 2019-10-28
Payer: MEDICARE

## 2019-10-28 DIAGNOSIS — R97.20 RISING PSA LEVEL: ICD-10-CM

## 2019-10-28 DIAGNOSIS — S66.821D LACERATION OF FLEXOR TENDON OF HAND, RIGHT, SUBSEQUENT ENCOUNTER: Primary | ICD-10-CM

## 2019-10-28 DIAGNOSIS — Z12.5 PROSTATE CANCER SCREENING: ICD-10-CM

## 2019-10-28 DIAGNOSIS — I71.2 ANEURYSM OF ASCENDING AORTA (HCC): ICD-10-CM

## 2019-10-28 DIAGNOSIS — I10 ESSENTIAL HYPERTENSION: ICD-10-CM

## 2019-10-28 DIAGNOSIS — E78.00 PURE HYPERCHOLESTEROLEMIA: ICD-10-CM

## 2019-10-28 DIAGNOSIS — M1A.0720 IDIOPATHIC CHRONIC GOUT OF LEFT ANKLE WITHOUT TOPHUS: ICD-10-CM

## 2019-10-28 LAB
ALBUMIN SERPL BCP-MCNC: 4.1 G/DL (ref 3.5–5)
ALP SERPL-CCNC: 84 U/L (ref 46–116)
ALT SERPL W P-5'-P-CCNC: 41 U/L (ref 12–78)
ANION GAP SERPL CALCULATED.3IONS-SCNC: 8 MMOL/L (ref 4–13)
AST SERPL W P-5'-P-CCNC: 21 U/L (ref 5–45)
BASOPHILS # BLD AUTO: 0.08 THOUSANDS/ΜL (ref 0–0.1)
BASOPHILS NFR BLD AUTO: 1 % (ref 0–1)
BILIRUB SERPL-MCNC: 0.53 MG/DL (ref 0.2–1)
BUN SERPL-MCNC: 22 MG/DL (ref 5–25)
CALCIUM SERPL-MCNC: 9.5 MG/DL (ref 8.3–10.1)
CHLORIDE SERPL-SCNC: 102 MMOL/L (ref 100–108)
CHOLEST SERPL-MCNC: 271 MG/DL (ref 50–200)
CO2 SERPL-SCNC: 27 MMOL/L (ref 21–32)
CREAT SERPL-MCNC: 1.12 MG/DL (ref 0.6–1.3)
EOSINOPHIL # BLD AUTO: 0.12 THOUSAND/ΜL (ref 0–0.61)
EOSINOPHIL NFR BLD AUTO: 2 % (ref 0–6)
ERYTHROCYTE [DISTWIDTH] IN BLOOD BY AUTOMATED COUNT: 12.3 % (ref 11.6–15.1)
GFR SERPL CREATININE-BSD FRML MDRD: 66 ML/MIN/1.73SQ M
GLUCOSE P FAST SERPL-MCNC: 93 MG/DL (ref 65–99)
HCT VFR BLD AUTO: 50.2 % (ref 36.5–49.3)
HDLC SERPL-MCNC: 75 MG/DL
HGB BLD-MCNC: 17 G/DL (ref 12–17)
IMM GRANULOCYTES # BLD AUTO: 0.07 THOUSAND/UL (ref 0–0.2)
IMM GRANULOCYTES NFR BLD AUTO: 1 % (ref 0–2)
LDLC SERPL CALC-MCNC: 171 MG/DL (ref 0–100)
LYMPHOCYTES # BLD AUTO: 2 THOUSANDS/ΜL (ref 0.6–4.47)
LYMPHOCYTES NFR BLD AUTO: 27 % (ref 14–44)
MCH RBC QN AUTO: 32.1 PG (ref 26.8–34.3)
MCHC RBC AUTO-ENTMCNC: 33.9 G/DL (ref 31.4–37.4)
MCV RBC AUTO: 95 FL (ref 82–98)
MONOCYTES # BLD AUTO: 0.8 THOUSAND/ΜL (ref 0.17–1.22)
MONOCYTES NFR BLD AUTO: 11 % (ref 4–12)
NEUTROPHILS # BLD AUTO: 4.26 THOUSANDS/ΜL (ref 1.85–7.62)
NEUTS SEG NFR BLD AUTO: 58 % (ref 43–75)
NRBC BLD AUTO-RTO: 0 /100 WBCS
PLATELET # BLD AUTO: 168 THOUSANDS/UL (ref 149–390)
PMV BLD AUTO: 10.7 FL (ref 8.9–12.7)
POTASSIUM SERPL-SCNC: 4.3 MMOL/L (ref 3.5–5.3)
PROT SERPL-MCNC: 7.6 G/DL (ref 6.4–8.2)
PSA SERPL-MCNC: 3.7 NG/ML (ref 0–4)
PSA SERPL-MCNC: 3.9 NG/ML (ref 0–4)
RBC # BLD AUTO: 5.3 MILLION/UL (ref 3.88–5.62)
SODIUM SERPL-SCNC: 137 MMOL/L (ref 136–145)
T4 FREE SERPL-MCNC: 0.81 NG/DL (ref 0.76–1.46)
TRIGL SERPL-MCNC: 124 MG/DL
TSH SERPL DL<=0.05 MIU/L-ACNC: 7.31 UIU/ML (ref 0.36–3.74)
URATE SERPL-MCNC: 9 MG/DL (ref 4.2–8)
WBC # BLD AUTO: 7.33 THOUSAND/UL (ref 4.31–10.16)

## 2019-10-28 PROCEDURE — 84153 ASSAY OF PSA TOTAL: CPT

## 2019-10-28 PROCEDURE — 84550 ASSAY OF BLOOD/URIC ACID: CPT

## 2019-10-28 PROCEDURE — 84439 ASSAY OF FREE THYROXINE: CPT

## 2019-10-28 PROCEDURE — 80061 LIPID PANEL: CPT

## 2019-10-28 PROCEDURE — 85025 COMPLETE CBC W/AUTO DIFF WBC: CPT

## 2019-10-28 PROCEDURE — 97140 MANUAL THERAPY 1/> REGIONS: CPT

## 2019-10-28 PROCEDURE — 36415 COLL VENOUS BLD VENIPUNCTURE: CPT

## 2019-10-28 PROCEDURE — 80053 COMPREHEN METABOLIC PANEL: CPT

## 2019-10-28 PROCEDURE — 84443 ASSAY THYROID STIM HORMONE: CPT

## 2019-10-28 NOTE — PROGRESS NOTES
Daily Note/Discharge    Today's date: 10/28/2019  Patient name: Yudy Perry  : 1950  MRN: [id number]  Referring provider: Ashli Sevilla PA-C  Dx:   Encounter Diagnosis     ICD-10-CM    1  Laceration of flexor tendon of hand, right, subsequent encounter [id number]                   Subjective: Pt reports his finger is doing well  No swelling over the weekend  Still has trouble bending the finger into palm  Planning on leaving for [place] for winter this week so going to make today his last day  Objective: See treatment diary below      Assessment: Tolerated treatment well  Patient exhibited good technique with therapeutic exercises and would benefit from continued PT Pt still has mild lag in FDP flexion of SF; provided pt with putty exercises (yellow) to perform at home to monitor resistance while also trying to promote better active motion of SF  Pt able to demonstrate  Goals  STG (2-3 weeks)  1: promote scar healing- met  2: Pt independent in HEP- met  3: increase passive digit flexion by 50%- met  4:  Increase active flexion of digits by 25%- met    LTG (6-8 weeks)  1: Improve FOTO 10-15 pts- met  2: Full passive flexion of digits- partially met  3: pt able to form about 50% comp fist- met  4: D/C splint at 6-8 weeks s/p- met    PROM 90/80/65  AROM 80/70/45  Active hook fist Dip flex 50 degrees    Plan: D/C to HEP     Precautions: DOS 19  DOI 19  D/C splint 10/22    RTD 11/15/19      Manual  10/25 10/28           Gentle passive flexion/tenodesis 5 5           Gentle 1/2 comp flexion 5 2            STM 5 3                         15 10               Exercise Diary  10/25            Towel bunches No TE            Cones grasping             Wrist AROM             flexbar towel twist             fingerweb digit flexion             Hand helper HEP: gentle comp fist; passive flexion in DBS, place-hold                                           Modalities  10/25 10/28            10 min 10

## 2019-10-30 ENCOUNTER — APPOINTMENT (OUTPATIENT)
Dept: PHYSICAL THERAPY | Facility: MEDICAL CENTER | Age: 69
End: 2019-10-30
Payer: MEDICARE

## 2019-11-18 DIAGNOSIS — I10 ESSENTIAL HYPERTENSION: ICD-10-CM

## 2019-11-18 DIAGNOSIS — E78.5 HYPERLIPIDEMIA, UNSPECIFIED HYPERLIPIDEMIA TYPE: ICD-10-CM

## 2019-11-18 DIAGNOSIS — R79.89 ELEVATED TSH: Primary | ICD-10-CM

## 2019-11-18 RX ORDER — ROSUVASTATIN CALCIUM 5 MG/1
TABLET, COATED ORAL
Qty: 30 TABLET | Refills: 5
Start: 2019-11-18 | End: 2021-12-13

## 2019-11-18 NOTE — TELEPHONE ENCOUNTER
Notes recorded by Wilma Wise RN on 11/18/2019 at 2:24 PM EST  Notified  He will try the crestor 5 mg MWF   Lab orders mailed to pt  Med list updated  ------    Notes recorded by Dominick Frausto RN on 11/12/2019 at 7:03 PM EST  [email or web address] to call back re results  ------    Notes recorded by Wilma Wise RN on 11/6/2019 at 7:09 PM EST  lmovm to call re labs   ------    Notes recorded by Myra Ulloa MD on 10/29/2019 at 9:42 PM EDT  Call patient regarding test   Cholesterol remains quite elevated   Consider taking rosuvastatin 5 mg Monday Wednesday Friday   I know that he has had muscle aches with Lipitor in the past   His TSH is elevated but T4 is normal   Uric acid is still a bit elevated and we can consider increasing his medications in the future   Prior to follow-up, I would like him to have a CMP, direct LDL, and TSH with reflex to T4   Let me know if he chooses to take the rosuvastatin

## 2019-12-23 ENCOUNTER — HOSPITAL ENCOUNTER (OUTPATIENT)
Dept: CT IMAGING | Facility: HOSPITAL | Age: 69
Discharge: HOME | End: 2019-12-23
Payer: MEDICARE

## 2019-12-23 ENCOUNTER — HOSPITAL ENCOUNTER (OUTPATIENT)
Dept: NON INVASIVE DIAGNOSTICS | Facility: CLINIC | Age: 69
Discharge: HOME | End: 2019-12-23
Payer: MEDICARE

## 2019-12-23 DIAGNOSIS — R01.1 HEART MURMUR: ICD-10-CM

## 2019-12-23 DIAGNOSIS — I71.2 ANEURYSM OF ASCENDING AORTA (HCC): ICD-10-CM

## 2019-12-23 PROCEDURE — 93306 TTE W/DOPPLER COMPLETE: CPT | Performed by: INTERNAL MEDICINE

## 2019-12-23 PROCEDURE — 93306 TTE W/DOPPLER COMPLETE: CPT

## 2019-12-23 PROCEDURE — 71250 CT THORAX DX C-: CPT

## 2019-12-26 DIAGNOSIS — I51.7 LEFT VENTRICULAR HYPERTROPHY: Primary | ICD-10-CM

## 2020-02-03 DIAGNOSIS — L02.429 BOIL, THIGH: Primary | ICD-10-CM

## 2020-02-03 NOTE — TELEPHONE ENCOUNTER
April León left a message requesting a refill on Mupirocin Cream 2%  Wants refill to go to [facility] in [place], [address]  Last refill that I see was 2018  I left a message for Atif Griffino to call back for reason needing medication  Is he having a flare up?

## 2020-02-05 RX ORDER — MUPIROCIN CALCIUM 20 MG/G
CREAM TOPICAL
Qty: 15 G | Refills: 5 | Status: SHIPPED | OUTPATIENT
Start: 2020-02-05 | End: 2020-09-09 | Stop reason: ALTCHOICE

## 2020-02-05 NOTE — TELEPHONE ENCOUNTER
Pt has a boil on thigh  He has been putting cream on it for two days his had on hand and has ran out   Orders put in pending approval

## 2020-02-06 ENCOUNTER — TELEPHONE (OUTPATIENT)
Dept: FAMILY MEDICINE CLINIC | Facility: CLINIC | Age: 70
End: 2020-02-06

## 2020-02-06 NOTE — TELEPHONE ENCOUNTER
The mupirocin cream is not covered by patient's insurance making it cost over $300 the pharmacy is switching it to the ointment which will bring the cost down to $7

## 2020-02-27 DIAGNOSIS — T75.3XXD MOTION SICKNESS, SUBSEQUENT ENCOUNTER: Primary | ICD-10-CM

## 2020-02-27 RX ORDER — SCOLOPAMINE TRANSDERMAL SYSTEM 1 MG/1
1 PATCH, EXTENDED RELEASE TRANSDERMAL
Qty: 10 PATCH | Refills: 1 | Status: SHIPPED | OUTPATIENT
Start: 2020-02-27

## 2020-05-14 DIAGNOSIS — M1A.09X0 CHRONIC GOUT OF MULTIPLE SITES, UNSPECIFIED CAUSE: ICD-10-CM

## 2020-05-14 RX ORDER — ALLOPURINOL 100 MG/1
100 TABLET ORAL DAILY
Qty: 90 TABLET | Refills: 3 | Status: SHIPPED | OUTPATIENT
Start: 2020-05-14 | End: 2020-09-09 | Stop reason: SDUPTHER

## 2020-09-09 ENCOUNTER — OFFICE VISIT (OUTPATIENT)
Dept: FAMILY MEDICINE CLINIC | Facility: CLINIC | Age: 70
End: 2020-09-09
Payer: MEDICARE

## 2020-09-09 VITALS
BODY MASS INDEX: 30.2 KG/M2 | HEART RATE: 77 BPM | WEIGHT: 248 LBS | HEIGHT: 76 IN | SYSTOLIC BLOOD PRESSURE: 138 MMHG | DIASTOLIC BLOOD PRESSURE: 88 MMHG | TEMPERATURE: 97.8 F | OXYGEN SATURATION: 93 %

## 2020-09-09 DIAGNOSIS — Z00.00 MEDICARE ANNUAL WELLNESS VISIT, SUBSEQUENT: ICD-10-CM

## 2020-09-09 DIAGNOSIS — E78.00 PURE HYPERCHOLESTEROLEMIA: ICD-10-CM

## 2020-09-09 DIAGNOSIS — F17.219 CIGARETTE NICOTINE DEPENDENCE WITH NICOTINE-INDUCED DISORDER: ICD-10-CM

## 2020-09-09 DIAGNOSIS — M1A.09X0 CHRONIC GOUT OF MULTIPLE SITES, UNSPECIFIED CAUSE: ICD-10-CM

## 2020-09-09 DIAGNOSIS — C43.9 MALIGNANT MELANOMA OF SKIN (HCC): ICD-10-CM

## 2020-09-09 DIAGNOSIS — Z78.9 ALCOHOL CONSUMPTION OF MORE THAN TWO DRINKS PER DAY: ICD-10-CM

## 2020-09-09 DIAGNOSIS — Z72.0 TOBACCO ABUSE: ICD-10-CM

## 2020-09-09 DIAGNOSIS — I10 ESSENTIAL HYPERTENSION: Primary | ICD-10-CM

## 2020-09-09 DIAGNOSIS — Z12.5 PROSTATE CANCER SCREENING: ICD-10-CM

## 2020-09-09 DIAGNOSIS — I71.2 ANEURYSM OF ASCENDING AORTA (HCC): ICD-10-CM

## 2020-09-09 DIAGNOSIS — M1A.0720 IDIOPATHIC CHRONIC GOUT OF LEFT ANKLE WITHOUT TOPHUS: ICD-10-CM

## 2020-09-09 PROBLEM — S61.401A: Status: RESOLVED | Noted: 2019-08-23 | Resolved: 2020-09-09

## 2020-09-09 PROBLEM — S66.821A: Status: RESOLVED | Noted: 2019-08-23 | Resolved: 2020-09-09

## 2020-09-09 PROCEDURE — 99214 OFFICE O/P EST MOD 30 MIN: CPT | Performed by: FAMILY MEDICINE

## 2020-09-09 PROCEDURE — G0439 PPPS, SUBSEQ VISIT: HCPCS | Performed by: FAMILY MEDICINE

## 2020-09-09 RX ORDER — ALLOPURINOL 100 MG/1
200 TABLET ORAL DAILY
Qty: 180 TABLET | Refills: 3 | Status: SHIPPED | OUTPATIENT
Start: 2020-09-09 | End: 2022-04-19 | Stop reason: SDUPTHER

## 2020-09-09 NOTE — PROGRESS NOTES
Assessment/Plan:       Problem List Items Addressed This Visit        Cardiovascular and Mediastinum    Aneurysm of ascending aorta ([address] )    Relevant Orders    CT chest wo contrast    Essential hypertension - Primary    Relevant Orders    CBC and differential    Comprehensive metabolic panel    Lipid Panel with Direct LDL reflex       Nervous and Auditory    Cigarette nicotine dependence with nicotine-induced disorder       Musculoskeletal and Integument    Idiopathic chronic gout of left ankle without tophus    Malignant melanoma of skin (HCC)       Other    Alcohol consumption of more than two drinks per day     We discussed the importance of moderation of alcohol intake         Pure hypercholesterolemia    Tobacco abuse    Relevant Orders    CBC and differential      Other Visit Diagnoses     Medicare annual wellness visit, subsequent        Chronic gout of multiple sites, unspecified cause        Relevant Medications    allopurinol (ZYLOPRIM) 100 mg tablet    Other Relevant Orders    Uric acid    Prostate cancer screening        Relevant Orders    PSA, Total Screen            Subjective:      Patient ID: Chucky Rogers is a 68 y o  male  HPI patient presents today for follow-up for chronic health issues  Overall, he feels pretty well  Unfortunately, he does continue to smoke but has cut back to about half pack per day  He denies any cough, wheeze, shortness of breath or hemoptysis  He has a known ascending aortic aneurysm  He denies chest pain or upper back pain  He has history of gout which has improved since he is on 200 mg of allopurinol  He does get some occasional pain in the did ankles but this seems to be much improved  He has history of hypertension  He denies any chest pain shortness of breath or palpitations  He is not on medication at this time for this  He has a history of hyperlipidemia and tolerates statin therapy        The following portions of the patient's history were reviewed and updated as appropriate: allergies, current medications, past family history, past medical history, past social history, past surgical history and problem list       Current Outpatient Medications:     allopurinol (ZYLOPRIM) 100 mg tablet, Take 2 tablets (200 mg total) by mouth daily, Disp: 180 tablet, Rfl: 3    Ascorbic Acid (VITAMIN C) 1000 MG tablet, Take 1 tablet by mouth daily, Disp: , Rfl:     rosuvastatin (CRESTOR) 5 mg tablet, take one tab by mouth M W F at bedtime  , Disp: 30 tablet, Rfl: 5    EPINEPHrine (EPIPEN) 0 3 mg/0 3 mL SOAJ, Inject 0 3 mL (0 3 mg total) into a muscle once for 1 dose As needed for allergic reaction, Disp: 0 3 mL, Rfl: 0    scopolamine (TRANSDERM-SCOP) 1 5 mg/3 days TD 72 hr patch, Place 1 patch on the skin every third day (Patient not taking: Reported on 9/9/2020), Disp: 10 patch, Rfl: 1     Review of Systems      Objective:      /88 (BP Location: Left arm, Patient Position: Sitting, Cuff Size: Standard)   Pulse 77   Temp 97 8 °F (36 6 °C)   Ht 6' 4" (1 93 m)   Wt 112 kg (248 lb)   SpO2 93%   BMI 30 19 kg/m²          Physical Exam  Constitutional:       General: He is not in acute distress  Appearance: He is well-developed  He is not diaphoretic  HENT:      Head: Normocephalic  Eyes:      General:         Right eye: No discharge  Left eye: No discharge  Pupils: Pupils are equal, round, and reactive to light  Neck:      Thyroid: No thyromegaly  Trachea: No tracheal deviation  Cardiovascular:      Rate and Rhythm: Normal rate and regular rhythm  Heart sounds: Murmur present  Pulmonary:      Effort: Pulmonary effort is normal  No respiratory distress  Breath sounds: No wheezing or rales  Abdominal:      General: There is no distension  Palpations: Abdomen is soft  Tenderness: There is no abdominal tenderness  Musculoskeletal: Normal range of motion  Lymphadenopathy:      Cervical: No cervical adenopathy  Skin:     General: Skin is warm  Findings: No erythema  Comments: Azar complected  Neurological:      Mental Status: He is alert and oriented to person, place, and time  Cranial Nerves: No cranial nerve deficit  Psychiatric:         Thought Content:  Thought content normal          Judgment: Judgment normal            Enoc Summers MD

## 2020-09-09 NOTE — PROGRESS NOTES
Assessment and Plan:     Problem List Items Addressed This Visit     None        BMI Counseling: Body mass index is 30 19 kg/m²  The BMI is above normal  Nutrition recommendations include encouraging healthy choices of fruits and vegetables  Exercise recommendations include moderate physical activity 150 minutes/week  Patient presents today for Medicare wellness visit  He is due for tetanus shot as well as the Shingrix vaccine  These are available at the pharmacy  I would suggest annual flu shot   Otherwise, he continues do well  He has some chronic pain from his right shoulder but states extremely active  He declines physical therapy at this time  He screens negative for depression  He notes some forgetfulness which will keep an eye on  He was encouraged once again to quit smoking  He screens negative for depression  He remains extremely active  Preventive health issues were discussed with patient, and age appropriate screening tests were ordered as noted in patient's After Visit Summary  Personalized health advice and appropriate referrals for health education or preventive services given if needed, as noted in patient's After Visit Summary       History of Present Illness:     Patient presents for Medicare Annual Wellness visit    Patient Care Team:  Yin Renee MD as PCP - General  MD Dora Gilliam DO Thomasine Carver, DO     Problem List:     Patient Active Problem List   Diagnosis    Alcohol consumption of more than two drinks per day    [facility]    Cigarette nicotine dependence with nicotine-induced disorder    Colonic polyp    Idiopathic chronic gout of left ankle without tophus    Heart murmur    Pure hypercholesterolemia    Essential hypertension    [facility]    Motion sickness    Osteoarthritis of left hip    History of left hip replacement    Tobacco abuse    Anaphylaxis    Subacromial bursitis of right shoulder joint    Flexor tendon laceration, hand, open wound, right, initial encounter      Past Medical and Surgical History:     Past Medical History:   Diagnosis Date    Abnormal chest CT     RESOLVED: [id number]    Balance disorder     RESOLVED: [id number]    Colon polyp     Lumbar radiculopathy     RESOLVED: [id number]    Lumbar spondylosis     RESOLVED: [id number]    Melanoma ([address] )      Past Surgical History:   Procedure Laterality Date    CHOLECYSTECTOMY      COLONOSCOPY      MT REPAIR FLEXOR TENDON,HAND,W/O GRAFT,EA Right 8/27/2019    Procedure: REPAIR FLEXOR TENDON right small FINGER;  Surgeon: Javier Contreras MD;  Location: [facility];  Service: Orthopedics    TONSILLECTOMY      TOTAL HIP ARTHROPLASTY Left 06/2016      Family History:     Family History   Problem Relation Age of Onset    Hypertension Father     Arthritis Family     Colon polyps Family       Social History:        Social History     Socioeconomic History    Marital status: /Civil Union     Spouse name: None    Number of children: None    Years of education: None    Highest education level: None   Occupational History    None   Social Needs    Financial resource strain: None    Food insecurity     Worry: None     Inability: None    Transportation needs     Medical: None     Non-medical: None   Tobacco Use    Smoking status: Current Every Day Smoker     Packs/day: 0 25    Smokeless tobacco: Never Used   Substance and Sexual Activity    Alcohol use: Yes     Frequency: 4 or more times a week     Drinks per session: 1 or 2     Comment: DAILY, liquor    Drug use: Not Currently    Sexual activity: None   Lifestyle    Physical activity     Days per week: None     Minutes per session: None    Stress: None   Relationships    Social connections     Talks on phone: None     Gets together: None     Attends Mosque service: None     Active member of club or organization: None     Attends meetings of clubs or organizations: None     Relationship status: None    Intimate partner violence     Fear of current or ex partner: None     Emotionally abused: None     Physically abused: None     Forced sexual activity: None   Other Topics Concern    None   Social History Narrative    None      Medications and Allergies:     Current Outpatient Medications   Medication Sig Dispense Refill    allopurinol (ZYLOPRIM) 100 mg tablet Take 1 tablet (100 mg total) by mouth daily 90 tablet 3    Ascorbic Acid (VITAMIN C) 1000 MG tablet Take 1 tablet by mouth daily      mupirocin (BACTROBAN) 2 % cream APPLY THIN LAYER EXT AA TID PRN 15 g 5    rosuvastatin (CRESTOR) 5 mg tablet take one tab by mouth M W F at bedtime  30 tablet 5    EPINEPHrine (EPIPEN) 0 3 mg/0 3 mL SOAJ Inject 0 3 mL (0 3 mg total) into a muscle once for 1 dose As needed for allergic reaction 0 3 mL 0    scopolamine (TRANSDERM-SCOP) 1 5 mg/3 days TD 72 hr patch Place 1 patch on the skin every third day (Patient not taking: Reported on 9/9/2020) 10 patch 1     No current facility-administered medications for this visit        Allergies   Allergen Reactions    Atorvastatin Myalgia    Venomil Honey Bee Venom [Honey Bee Venom] Swelling      Immunizations:     Immunization History   Administered Date(s) Administered    H1N1, All Formulations 01/06/2010, 11/30/2010    INFLUENZA 11/18/2013    Influenza Split High Dose Preservative Free IM 09/26/2014, 10/07/2015, 10/14/2016, 09/20/2017    Influenza TIV (IM) 11/13/2006, 10/15/2009    Influenza, high dose seasonal 0 7 mL 10/01/2018, 10/25/2019    Pneumococcal Conjugate 13-Valent 10/14/2016    Pneumococcal Polysaccharide PPV23 12/07/2017    Zoster 12/19/2011, 11/18/2013      Health Maintenance:         Topic Date Due    Hepatitis C Screening  Completed         Topic Date Due    DTaP,Tdap,and Td Vaccines (1 - Tdap) 10/13/1969    Influenza Vaccine  07/01/2020      Medicare Health Risk Assessment:     /88 (BP Location: Left arm, Patient Position: Sitting, Cuff Size: Standard)   Pulse 77   Temp 97 8 °F (36 6 °C)   Ht 6' 4" (1 93 m)   Wt 112 kg (248 lb)   SpO2 93%   BMI 30 19 kg/m²      Josefina Robb is here for his Subsequent Wellness visit  Health Risk Assessment:   Patient rates overall health as good  Patient feels that their physical health rating is same  Eyesight was rated as same  Hearing was rated as same  Patient feels that their emotional and mental health rating is same  Pain experienced in the last 7 days has been none  Patient states that he has experienced no weight loss or gain in last 6 months  Depression Screening:   PHQ-2 Score: 0      Fall Risk Screening: In the past year, patient has experienced: no history of falling in past year      Home Safety:  Patient has trouble with stairs inside or outside of their home  Patient has working smoke alarms and has working carbon monoxide detector  Home safety hazards include: none  Nutrition:   Current diet is Regular  Medications:   Patient is currently taking over-the-counter supplements  OTC medications include: see medication list  Patient is able to manage medications  Activities of Daily Living (ADLs)/Instrumental Activities of Daily Living (IADLs):   Walk and transfer into and out of bed and chair?: Yes  Dress and groom yourself?: Yes    Bathe or shower yourself?: Yes    Feed yourself? Yes  Do your laundry/housekeeping?: Yes  Manage your money, pay your bills and track your expenses?: Yes  Make your own meals?: Yes    Do your own shopping?: Yes    Previous Hospitalizations:   Any hospitalizations or ED visits within the last 12 months?: No      Advance Care Planning:   Living will: No    Durable POA for healthcare:  Yes    Advanced directive: No      PREVENTIVE SCREENINGS      Cardiovascular Screening:    General: Screening Not Indicated and History Lipid Disorder      Diabetes Screening:     General: Screening Current Prostate Cancer Screening:    General: Screening Current      Abdominal Aortic Aneurysm (AAA) Screening:    Risk factors include: age between 73-69 yo and tobacco use        Lung Cancer Screening:     General: Screening Not Indicated      Hepatitis C Screening:    General: Screening Current      Carlene Del Rosario MD

## 2020-09-18 ENCOUNTER — TELEPHONE (OUTPATIENT)
Dept: FAMILY MEDICINE CLINIC | Facility: CLINIC | Age: 70
End: 2020-09-18

## 2020-09-18 DIAGNOSIS — Z72.0 TOBACCO ABUSE: Primary | ICD-10-CM

## 2020-09-18 NOTE — TELEPHONE ENCOUNTER
Pt called today, татьяна butler have previously discussed starting on wellbutrin but this was never sent to the pharmacy   Please send over to [facility]

## 2020-09-21 RX ORDER — BUPROPION HYDROCHLORIDE 150 MG/1
150 TABLET ORAL EVERY MORNING
Qty: 90 TABLET | Refills: 3 | Status: SHIPPED | OUTPATIENT
Start: 2020-09-21 | End: 2021-12-13

## 2020-09-26 ENCOUNTER — HOSPITAL ENCOUNTER (OUTPATIENT)
Dept: CT IMAGING | Facility: HOSPITAL | Age: 70
Discharge: HOME | End: 2020-09-26
Payer: MEDICARE

## 2020-09-26 DIAGNOSIS — I71.2 ANEURYSM OF ASCENDING AORTA (HCC): ICD-10-CM

## 2020-09-26 PROCEDURE — G1004 CDSM NDSC: HCPCS

## 2020-09-26 PROCEDURE — 71250 CT THORAX DX C-: CPT

## 2020-10-09 ENCOUNTER — IMMUNIZATIONS (OUTPATIENT)
Dept: FAMILY MEDICINE CLINIC | Facility: CLINIC | Age: 70
End: 2020-10-09
Payer: MEDICARE

## 2020-10-09 VITALS — TEMPERATURE: 97.5 F

## 2020-10-09 DIAGNOSIS — Z23 FLU VACCINE NEED: Primary | ICD-10-CM

## 2020-10-09 PROCEDURE — G0008 ADMIN INFLUENZA VIRUS VAC: HCPCS

## 2020-10-09 PROCEDURE — 90662 IIV NO PRSV INCREASED AG IM: CPT

## 2020-12-23 ENCOUNTER — APPOINTMENT (OUTPATIENT)
Dept: LAB | Facility: CLINIC | Age: 70
End: 2020-12-23
Payer: MEDICARE

## 2020-12-23 DIAGNOSIS — I10 ESSENTIAL HYPERTENSION: ICD-10-CM

## 2020-12-23 DIAGNOSIS — M1A.09X0 CHRONIC GOUT OF MULTIPLE SITES, UNSPECIFIED CAUSE: ICD-10-CM

## 2020-12-23 DIAGNOSIS — Z72.0 TOBACCO ABUSE: ICD-10-CM

## 2020-12-23 DIAGNOSIS — Z12.5 PROSTATE CANCER SCREENING: ICD-10-CM

## 2020-12-23 LAB
ALBUMIN SERPL BCP-MCNC: 4.1 G/DL (ref 3.5–5)
ALP SERPL-CCNC: 85 U/L (ref 46–116)
ALT SERPL W P-5'-P-CCNC: 65 U/L (ref 12–78)
ANION GAP SERPL CALCULATED.3IONS-SCNC: 3 MMOL/L (ref 4–13)
AST SERPL W P-5'-P-CCNC: 31 U/L (ref 5–45)
BASOPHILS # BLD AUTO: 0.08 THOUSANDS/ΜL (ref 0–0.1)
BASOPHILS NFR BLD AUTO: 2 % (ref 0–1)
BILIRUB SERPL-MCNC: 0.56 MG/DL (ref 0.2–1)
BUN SERPL-MCNC: 25 MG/DL (ref 5–25)
CALCIUM SERPL-MCNC: 9.9 MG/DL (ref 8.3–10.1)
CHLORIDE SERPL-SCNC: 105 MMOL/L (ref 100–108)
CHOLEST SERPL-MCNC: 275 MG/DL (ref 50–200)
CO2 SERPL-SCNC: 30 MMOL/L (ref 21–32)
CREAT SERPL-MCNC: 1.14 MG/DL (ref 0.6–1.3)
EOSINOPHIL # BLD AUTO: 0.15 THOUSAND/ΜL (ref 0–0.61)
EOSINOPHIL NFR BLD AUTO: 3 % (ref 0–6)
ERYTHROCYTE [DISTWIDTH] IN BLOOD BY AUTOMATED COUNT: 12.8 % (ref 11.6–15.1)
GFR SERPL CREATININE-BSD FRML MDRD: 64 ML/MIN/1.73SQ M
GLUCOSE P FAST SERPL-MCNC: 90 MG/DL (ref 65–99)
HCT VFR BLD AUTO: 49 % (ref 36.5–49.3)
HDLC SERPL-MCNC: 60 MG/DL
HGB BLD-MCNC: 16.1 G/DL (ref 12–17)
IMM GRANULOCYTES # BLD AUTO: 0.03 THOUSAND/UL (ref 0–0.2)
IMM GRANULOCYTES NFR BLD AUTO: 1 % (ref 0–2)
LDLC SERPL CALC-MCNC: 157 MG/DL (ref 0–100)
LYMPHOCYTES # BLD AUTO: 2.37 THOUSANDS/ΜL (ref 0.6–4.47)
LYMPHOCYTES NFR BLD AUTO: 43 % (ref 14–44)
MCH RBC QN AUTO: 31.6 PG (ref 26.8–34.3)
MCHC RBC AUTO-ENTMCNC: 32.9 G/DL (ref 31.4–37.4)
MCV RBC AUTO: 96 FL (ref 82–98)
MONOCYTES # BLD AUTO: 0.5 THOUSAND/ΜL (ref 0.17–1.22)
MONOCYTES NFR BLD AUTO: 9 % (ref 4–12)
NEUTROPHILS # BLD AUTO: 2.27 THOUSANDS/ΜL (ref 1.85–7.62)
NEUTS SEG NFR BLD AUTO: 42 % (ref 43–75)
NRBC BLD AUTO-RTO: 0 /100 WBCS
PLATELET # BLD AUTO: 199 THOUSANDS/UL (ref 149–390)
PMV BLD AUTO: 10.2 FL (ref 8.9–12.7)
POTASSIUM SERPL-SCNC: 4.4 MMOL/L (ref 3.5–5.3)
PROT SERPL-MCNC: 7.6 G/DL (ref 6.4–8.2)
PSA SERPL-MCNC: 4.4 NG/ML (ref 0–4)
RBC # BLD AUTO: 5.1 MILLION/UL (ref 3.88–5.62)
SODIUM SERPL-SCNC: 138 MMOL/L (ref 136–145)
TRIGL SERPL-MCNC: 288 MG/DL
URATE SERPL-MCNC: 8.2 MG/DL (ref 4.2–8)
WBC # BLD AUTO: 5.4 THOUSAND/UL (ref 4.31–10.16)

## 2020-12-23 PROCEDURE — 85025 COMPLETE CBC W/AUTO DIFF WBC: CPT

## 2020-12-23 PROCEDURE — 80061 LIPID PANEL: CPT

## 2020-12-23 PROCEDURE — 84550 ASSAY OF BLOOD/URIC ACID: CPT

## 2020-12-23 PROCEDURE — 80053 COMPREHEN METABOLIC PANEL: CPT

## 2020-12-23 PROCEDURE — G0103 PSA SCREENING: HCPCS

## 2020-12-23 PROCEDURE — 36415 COLL VENOUS BLD VENIPUNCTURE: CPT

## 2021-11-26 DIAGNOSIS — E78.00 PURE HYPERCHOLESTEROLEMIA: ICD-10-CM

## 2021-11-26 DIAGNOSIS — M1A.0720 IDIOPATHIC CHRONIC GOUT OF LEFT ANKLE WITHOUT TOPHUS: ICD-10-CM

## 2021-11-26 DIAGNOSIS — I71.2 ANEURYSM OF ASCENDING AORTA (HCC): ICD-10-CM

## 2021-11-26 DIAGNOSIS — R01.1 HEART MURMUR: ICD-10-CM

## 2021-11-26 DIAGNOSIS — I10 ESSENTIAL HYPERTENSION: Primary | ICD-10-CM

## 2021-11-30 ENCOUNTER — APPOINTMENT (OUTPATIENT)
Dept: LAB | Facility: MEDICAL CENTER | Age: 71
End: 2021-11-30
Payer: MEDICARE

## 2021-11-30 DIAGNOSIS — M1A.0720 IDIOPATHIC CHRONIC GOUT OF LEFT ANKLE WITHOUT TOPHUS: ICD-10-CM

## 2021-11-30 DIAGNOSIS — I71.2 ANEURYSM OF ASCENDING AORTA (HCC): ICD-10-CM

## 2021-11-30 DIAGNOSIS — I10 ESSENTIAL HYPERTENSION: ICD-10-CM

## 2021-11-30 DIAGNOSIS — E78.00 PURE HYPERCHOLESTEROLEMIA: ICD-10-CM

## 2021-11-30 LAB
ALBUMIN SERPL BCP-MCNC: 3.7 G/DL (ref 3.5–5)
ALP SERPL-CCNC: 84 U/L (ref 46–116)
ALT SERPL W P-5'-P-CCNC: 46 U/L (ref 12–78)
ANION GAP SERPL CALCULATED.3IONS-SCNC: 4 MMOL/L (ref 4–13)
AST SERPL W P-5'-P-CCNC: 30 U/L (ref 5–45)
BASOPHILS # BLD AUTO: 0.08 THOUSANDS/ΜL (ref 0–0.1)
BASOPHILS NFR BLD AUTO: 2 % (ref 0–1)
BILIRUB SERPL-MCNC: 0.72 MG/DL (ref 0.2–1)
BUN SERPL-MCNC: 16 MG/DL (ref 5–25)
CALCIUM SERPL-MCNC: 10.7 MG/DL (ref 8.3–10.1)
CHLORIDE SERPL-SCNC: 105 MMOL/L (ref 100–108)
CHOLEST SERPL-MCNC: 241 MG/DL
CO2 SERPL-SCNC: 27 MMOL/L (ref 21–32)
CREAT SERPL-MCNC: 1.06 MG/DL (ref 0.6–1.3)
EOSINOPHIL # BLD AUTO: 0.15 THOUSAND/ΜL (ref 0–0.61)
EOSINOPHIL NFR BLD AUTO: 3 % (ref 0–6)
ERYTHROCYTE [DISTWIDTH] IN BLOOD BY AUTOMATED COUNT: 12.5 % (ref 11.6–15.1)
GFR SERPL CREATININE-BSD FRML MDRD: 69 ML/MIN/1.73SQ M
GLUCOSE P FAST SERPL-MCNC: 95 MG/DL (ref 65–99)
HCT VFR BLD AUTO: 48.6 % (ref 36.5–49.3)
HDLC SERPL-MCNC: 52 MG/DL
HGB BLD-MCNC: 16.1 G/DL (ref 12–17)
IMM GRANULOCYTES # BLD AUTO: 0.02 THOUSAND/UL (ref 0–0.2)
IMM GRANULOCYTES NFR BLD AUTO: 0 % (ref 0–2)
LDLC SERPL CALC-MCNC: 144 MG/DL (ref 0–100)
LYMPHOCYTES # BLD AUTO: 1.74 THOUSANDS/ΜL (ref 0.6–4.47)
LYMPHOCYTES NFR BLD AUTO: 34 % (ref 14–44)
MCH RBC QN AUTO: 31.4 PG (ref 26.8–34.3)
MCHC RBC AUTO-ENTMCNC: 33.1 G/DL (ref 31.4–37.4)
MCV RBC AUTO: 95 FL (ref 82–98)
MONOCYTES # BLD AUTO: 0.54 THOUSAND/ΜL (ref 0.17–1.22)
MONOCYTES NFR BLD AUTO: 11 % (ref 4–12)
NEUTROPHILS # BLD AUTO: 2.61 THOUSANDS/ΜL (ref 1.85–7.62)
NEUTS SEG NFR BLD AUTO: 50 % (ref 43–75)
NRBC BLD AUTO-RTO: 0 /100 WBCS
PLATELET # BLD AUTO: 175 THOUSANDS/UL (ref 149–390)
PMV BLD AUTO: 10 FL (ref 8.9–12.7)
POTASSIUM SERPL-SCNC: 4.5 MMOL/L (ref 3.5–5.3)
PROT SERPL-MCNC: 7.4 G/DL (ref 6.4–8.2)
RBC # BLD AUTO: 5.13 MILLION/UL (ref 3.88–5.62)
SODIUM SERPL-SCNC: 136 MMOL/L (ref 136–145)
TRIGL SERPL-MCNC: 225 MG/DL
URATE SERPL-MCNC: 7.8 MG/DL (ref 4.2–8)
WBC # BLD AUTO: 5.14 THOUSAND/UL (ref 4.31–10.16)

## 2021-11-30 PROCEDURE — 80061 LIPID PANEL: CPT

## 2021-11-30 PROCEDURE — 36415 COLL VENOUS BLD VENIPUNCTURE: CPT

## 2021-11-30 PROCEDURE — 85025 COMPLETE CBC W/AUTO DIFF WBC: CPT

## 2021-11-30 PROCEDURE — 80053 COMPREHEN METABOLIC PANEL: CPT

## 2021-11-30 PROCEDURE — 84550 ASSAY OF BLOOD/URIC ACID: CPT

## 2021-12-13 ENCOUNTER — OFFICE VISIT (OUTPATIENT)
Dept: FAMILY MEDICINE CLINIC | Facility: CLINIC | Age: 71
End: 2021-12-13
Payer: MEDICARE

## 2021-12-13 VITALS
HEIGHT: 76 IN | DIASTOLIC BLOOD PRESSURE: 86 MMHG | HEART RATE: 78 BPM | TEMPERATURE: 97.5 F | BODY MASS INDEX: 31.9 KG/M2 | OXYGEN SATURATION: 94 % | WEIGHT: 262 LBS | SYSTOLIC BLOOD PRESSURE: 160 MMHG

## 2021-12-13 DIAGNOSIS — I71.2 ANEURYSM OF ASCENDING AORTA (HCC): ICD-10-CM

## 2021-12-13 DIAGNOSIS — I10 ESSENTIAL HYPERTENSION: ICD-10-CM

## 2021-12-13 DIAGNOSIS — E78.00 PURE HYPERCHOLESTEROLEMIA: ICD-10-CM

## 2021-12-13 DIAGNOSIS — Z12.5 PROSTATE CANCER SCREENING: ICD-10-CM

## 2021-12-13 DIAGNOSIS — Z87.891 HISTORY OF TOBACCO ABUSE: ICD-10-CM

## 2021-12-13 DIAGNOSIS — E83.52 HYPERCALCEMIA: ICD-10-CM

## 2021-12-13 DIAGNOSIS — K63.5 POLYP OF COLON, UNSPECIFIED PART OF COLON, UNSPECIFIED TYPE: ICD-10-CM

## 2021-12-13 DIAGNOSIS — C43.9 MALIGNANT MELANOMA OF SKIN (HCC): ICD-10-CM

## 2021-12-13 DIAGNOSIS — Z00.00 MEDICARE ANNUAL WELLNESS VISIT, SUBSEQUENT: Primary | ICD-10-CM

## 2021-12-13 DIAGNOSIS — E66.9 OBESITY, CLASS I, BMI 30-34.9: ICD-10-CM

## 2021-12-13 PROCEDURE — 1123F ACP DISCUSS/DSCN MKR DOCD: CPT | Performed by: FAMILY MEDICINE

## 2021-12-13 PROCEDURE — 99214 OFFICE O/P EST MOD 30 MIN: CPT | Performed by: FAMILY MEDICINE

## 2021-12-13 PROCEDURE — G0439 PPPS, SUBSEQ VISIT: HCPCS | Performed by: FAMILY MEDICINE

## 2021-12-13 RX ORDER — METHYLPREDNISOLONE 4 MG/1
TABLET ORAL
COMMUNITY
Start: 2021-11-18

## 2021-12-13 RX ORDER — PRAVASTATIN SODIUM 10 MG
10 TABLET ORAL
Qty: 90 TABLET | Refills: 3 | Status: SHIPPED | OUTPATIENT
Start: 2021-12-13

## 2021-12-13 RX ORDER — OLMESARTAN MEDOXOMIL 20 MG/1
20 TABLET ORAL DAILY
Qty: 90 TABLET | Refills: 3 | Status: SHIPPED | OUTPATIENT
Start: 2021-12-13

## 2021-12-15 ENCOUNTER — LAB (OUTPATIENT)
Dept: LAB | Facility: MEDICAL CENTER | Age: 71
End: 2021-12-15
Payer: MEDICARE

## 2021-12-15 DIAGNOSIS — Z12.5 PROSTATE CANCER SCREENING: ICD-10-CM

## 2021-12-15 DIAGNOSIS — I10 ESSENTIAL HYPERTENSION: ICD-10-CM

## 2021-12-15 DIAGNOSIS — E83.52 HYPERCALCEMIA: ICD-10-CM

## 2021-12-15 LAB
25(OH)D3 SERPL-MCNC: 19.5 NG/ML (ref 30–100)
ALBUMIN SERPL BCP-MCNC: 4.1 G/DL (ref 3.5–5)
ALP SERPL-CCNC: 81 U/L (ref 46–116)
ALT SERPL W P-5'-P-CCNC: 60 U/L (ref 12–78)
ANION GAP SERPL CALCULATED.3IONS-SCNC: 5 MMOL/L (ref 4–13)
AST SERPL W P-5'-P-CCNC: 31 U/L (ref 5–45)
BILIRUB SERPL-MCNC: 0.56 MG/DL (ref 0.2–1)
BUN SERPL-MCNC: 22 MG/DL (ref 5–25)
CALCIUM SERPL-MCNC: 10.3 MG/DL (ref 8.3–10.1)
CHLORIDE SERPL-SCNC: 104 MMOL/L (ref 100–108)
CO2 SERPL-SCNC: 29 MMOL/L (ref 21–32)
CREAT SERPL-MCNC: 1.03 MG/DL (ref 0.6–1.3)
GFR SERPL CREATININE-BSD FRML MDRD: 71 ML/MIN/1.73SQ M
GLUCOSE P FAST SERPL-MCNC: 94 MG/DL (ref 65–99)
POTASSIUM SERPL-SCNC: 4.2 MMOL/L (ref 3.5–5.3)
PROT SERPL-MCNC: 7.7 G/DL (ref 6.4–8.2)
PSA SERPL-MCNC: 3.5 NG/ML (ref 0–4)
PTH-INTACT SERPL-MCNC: 105.4 PG/ML (ref 18.4–80.1)
SODIUM SERPL-SCNC: 138 MMOL/L (ref 136–145)

## 2021-12-15 PROCEDURE — G0103 PSA SCREENING: HCPCS

## 2021-12-15 PROCEDURE — 83970 ASSAY OF PARATHORMONE: CPT

## 2021-12-15 PROCEDURE — 82306 VITAMIN D 25 HYDROXY: CPT

## 2021-12-15 PROCEDURE — 80053 COMPREHEN METABOLIC PANEL: CPT

## 2021-12-15 PROCEDURE — 36415 COLL VENOUS BLD VENIPUNCTURE: CPT

## 2021-12-28 ENCOUNTER — TELEPHONE (OUTPATIENT)
Dept: FAMILY MEDICINE CLINIC | Facility: CLINIC | Age: 71
End: 2021-12-28

## 2021-12-28 DIAGNOSIS — Z03.818 ENCOUNTER FOR OBSERVATION FOR SUSPECTED EXPOSURE TO OTHER BIOLOGICAL AGENTS RULED OUT: Primary | ICD-10-CM

## 2021-12-29 PROCEDURE — U0003 INFECTIOUS AGENT DETECTION BY NUCLEIC ACID (DNA OR RNA); SEVERE ACUTE RESPIRATORY SYNDROME CORONAVIRUS 2 (SARS-COV-2) (CORONAVIRUS DISEASE [COVID-19]), AMPLIFIED PROBE TECHNIQUE, MAKING USE OF HIGH THROUGHPUT TECHNOLOGIES AS DESCRIBED BY CMS-2020-01-R: HCPCS | Performed by: FAMILY MEDICINE

## 2021-12-29 PROCEDURE — U0005 INFEC AGEN DETEC AMPLI PROBE: HCPCS | Performed by: FAMILY MEDICINE

## 2021-12-30 ENCOUNTER — HOSPITAL ENCOUNTER (OUTPATIENT)
Dept: CT IMAGING | Facility: HOSPITAL | Age: 71
Discharge: HOME | End: 2021-12-30
Payer: MEDICARE

## 2021-12-30 DIAGNOSIS — I71.2 ANEURYSM OF ASCENDING AORTA (HCC): ICD-10-CM

## 2021-12-30 PROCEDURE — 71250 CT THORAX DX C-: CPT

## 2021-12-30 PROCEDURE — G1004 CDSM NDSC: HCPCS

## 2022-01-12 ENCOUNTER — TELEMEDICINE (OUTPATIENT)
Dept: FAMILY MEDICINE CLINIC | Facility: CLINIC | Age: 72
End: 2022-01-12

## 2022-01-12 ENCOUNTER — TELEMEDICINE (OUTPATIENT)
Dept: FAMILY MEDICINE CLINIC | Facility: CLINIC | Age: 72
End: 2022-01-12
Payer: MEDICARE

## 2022-01-12 DIAGNOSIS — U07.1 COVID-19: Primary | ICD-10-CM

## 2022-01-12 PROCEDURE — 99442 PR PHYS/QHP TELEPHONE EVALUATION 11-20 MIN: CPT | Performed by: FAMILY MEDICINE

## 2022-01-12 RX ORDER — BUDESONIDE 180 UG/1
4 AEROSOL, POWDER RESPIRATORY (INHALATION) 2 TIMES DAILY
Qty: 1 EACH | Refills: 0 | Status: SHIPPED | OUTPATIENT
Start: 2022-01-12 | End: 2022-01-26

## 2022-01-12 NOTE — PROGRESS NOTES
COVID-19 Outpatient Progress Note    Assessment/Plan:    Problem List Items Addressed This Visit        Other    COVID-19 - Primary    Relevant Medications    budesonide (Pulmicort Flexhaler) 180 MCG/ACT inhaler         Disposition:     I have spent 15 minutes directly with the patient  Greater than 50% of this time was spent in counseling/coordination of care regarding: risks and benefits of treatment options and impressions  I sent in budesonide for the patient  He agrees to notify me immediately if the situation gets any worse  Encounter provider Iam Bhatia MD    Provider located at [address] [address]  [phone]    Recent Visits  No visits were found meeting these conditions  Showing recent visits within past 7 days and meeting all other requirements  Today's Visits  Date Type Provider Dept   01/12/22 Telemedicine Iam Bhatia MD [facility]   Showing today's visits and meeting all other requirements  Future Appointments  No visits were found meeting these conditions  Showing future appointments within next 150 days and meeting all other requirements     This virtual check-in was done via telephone and he agrees to proceed  Patient agrees to participate in a virtual check in via telephone or video visit instead of presenting to the office to address urgent/immediate medical needs  Patient is aware this is a billable service  After connecting through Telephone, the patient was identified by name and date of birth  Latoya Tomlinson was informed that this was a telemedicine visit and that the exam was being conducted confidentially over secure lines  Latoya Tomlinson acknowledged consent and understanding of privacy and security of the telemedicine visit   I informed the patient that I have reviewed his record in Epic and presented the opportunity for him to ask any questions regarding the visit today  The patient agreed to participate  Verification of patient location:  Patient is located in the following state in which I hold an active license: [place]    Subjective:   Jona Subramanian is a 66 y o  male who is concerned about COVID-19  Patient's symptoms include fatigue, nasal congestion, rhinorrhea, cough, myalgias and headache  Patient denies fever, chills, malaise, sore throat, anosmia, loss of taste, shortness of breath, abdominal pain, nausea, vomiting and diarrhea  - Date of symptom onset: 1/3/2021      COVID-19 vaccination status: Fully vaccinated (primary series)    The patient is having mild symptoms  He did a positive home test   He does have some elevated risk with COPD but declines remdesivir  Lab Results   Component Value Date    SARSCOV2 Negative 12/29/2021     Past Medical History:   Diagnosis Date    Abnormal chest CT     RESOLVED: [id number]    Balance disorder     RESOLVED: [id number]    Colon polyp     Lumbar radiculopathy     RESOLVED: [id number]    Lumbar spondylosis     RESOLVED: [id number]    Melanoma ([facility] [address] )      Past Surgical History:   Procedure Laterality Date    CHOLECYSTECTOMY      COLONOSCOPY      NE REPAIR FLEXOR TENDON,HAND,W/O GRAFT,EA Right 8/27/2019    Procedure: REPAIR FLEXOR TENDON right small FINGER;  Surgeon: Ashley Hunter MD;  Location: [facility];  Service: Orthopedics    TONSILLECTOMY      TOTAL HIP ARTHROPLASTY Left 06/2016     Current Outpatient Medications   Medication Sig Dispense Refill    allopurinol (ZYLOPRIM) 100 mg tablet Take 2 tablets (200 mg total) by mouth daily 180 tablet 3    Ascorbic Acid (VITAMIN C) 1000 MG tablet Take 1 tablet by mouth daily      budesonide (Pulmicort Flexhaler) 180 MCG/ACT inhaler Inhale 4 puffs 2 (two) times a day for 14 days Rinse mouth after use   1 each 0    EPINEPHrine (EPIPEN) 0 3 mg/0 3 mL SOAJ Inject 0 3 mL (0 3 mg total) into a muscle once for 1 dose As needed for allergic reaction 0 3 mL 0    methylPREDNISolone 4 MG tablet therapy pack Take as directed      olmesartan (BENICAR) 20 mg tablet Take 1 tablet (20 mg total) by mouth daily 90 tablet 3    pravastatin (PRAVACHOL) 10 mg tablet Take 1 tablet (10 mg total) by mouth daily at bedtime 90 tablet 3    scopolamine (TRANSDERM-SCOP) 1 5 mg/3 days TD 72 hr patch Place 1 patch on the skin every third day 10 patch 1     No current facility-administered medications for this visit  Allergies   Allergen Reactions    Atorvastatin Myalgia    Rosuvastatin Myalgia    Venomil Honey Bee Venom [Honey Bee Venom] Swelling       Review of Systems   Constitutional: Positive for fatigue  Negative for chills and fever  HENT: Positive for congestion and rhinorrhea  Negative for sore throat  Respiratory: Positive for cough  Negative for shortness of breath  Gastrointestinal: Negative for abdominal pain, diarrhea, nausea and vomiting  Musculoskeletal: Positive for myalgias  Neurological: Positive for headaches  Objective: There were no vitals filed for this visit  Physical Exam    VIRTUAL VISIT [facility] verbally agrees to participate in [employer]  Pt is aware that [employer] could be limited without vital signs or the ability to perform a full hands-on physical Rashaad Alter understands he or the provider may request at any time to terminate the video visit and request the patient to seek care or treatment in person

## 2022-01-18 ENCOUNTER — TELEPHONE (OUTPATIENT)
Dept: FAMILY MEDICINE CLINIC | Facility: CLINIC | Age: 72
End: 2022-01-18

## 2022-01-18 NOTE — TELEPHONE ENCOUNTER
Pharmacy notice received that Pulmicort Flexhaler is not covered by patient insurance plan  Preferred alternatives are  * ARNUITY ELLIPTA  * FLOVENT DISKUS  * FLOVENT HFA    Please advise and send new rx if appropriate  Thank you!

## 2022-02-08 ENCOUNTER — TELEPHONE (OUTPATIENT)
Dept: GASTROENTEROLOGY | Facility: MEDICAL CENTER | Age: 72
End: 2022-02-08

## 2022-02-08 ENCOUNTER — PREP FOR PROCEDURE (OUTPATIENT)
Dept: GASTROENTEROLOGY | Facility: MEDICAL CENTER | Age: 72
End: 2022-02-08

## 2022-02-08 DIAGNOSIS — Z12.11 SCREENING FOR COLON CANCER: Primary | ICD-10-CM

## 2022-02-08 NOTE — TELEPHONE ENCOUNTER
02/08/22  Screened by: [facility]    Referring Provider  Dr Birdie Diaz    Pre- Screening: There is no height or weight on file to calculate BMI  31 89  Has patient been referred for a routine screening Colonoscopy? yes  Is the patient between 39-70 years old? yes      Previous Colonoscopy yes   If yes:    Date: unsure     Facility: unsure    Reason: unsure      SCHEDULING STAFF: If the patient is between 39yrs-47yrs, please advise patient to confirm benefits/coverage with their insurance company for a routine screening colonoscopy, some insurance carriers will only cover at [address] or older  If the patient is over 66years old, please schedule an office visit  Does the patient want to see a Gastroenterologist prior to their procedure OR are they having any GI symptoms? no    Has the patient been hospitalized or had abdominal surgery in the past 6 months? no    Does the patient use supplemental oxygen? no    Does the patient take Coumadin, Lovenox, Plavix, Elliquis, Xarelto, or other blood thinning medication? no    Has the patient had a stroke, cardiac event, or stent placed in the past year? no    SCHEDULING STAFF: If patient answers NO to above questions, then schedule procedure  If patient answers YES to above questions, then schedule office appointment  If patient is between 45yrs - 49yrs, please advise patient that we will have to confirm benefits & coverage with their insurance company for a routine screening colonoscopy

## 2022-04-19 DIAGNOSIS — M1A.09X0 CHRONIC GOUT OF MULTIPLE SITES, UNSPECIFIED CAUSE: ICD-10-CM

## 2022-04-19 RX ORDER — ALLOPURINOL 100 MG/1
200 TABLET ORAL DAILY
Qty: 180 TABLET | Refills: 3 | Status: SHIPPED | OUTPATIENT
Start: 2022-04-19

## 2022-06-01 RX ORDER — SODIUM CHLORIDE 9 MG/ML
125 INJECTION, SOLUTION INTRAVENOUS CONTINUOUS
Status: CANCELLED | OUTPATIENT
Start: 2022-06-01

## 2022-06-02 ENCOUNTER — ANESTHESIA EVENT (OUTPATIENT)
Dept: GASTROENTEROLOGY | Facility: MEDICAL CENTER | Age: 72
End: 2022-06-02

## 2022-06-02 ENCOUNTER — HOSPITAL ENCOUNTER (OUTPATIENT)
Dept: GASTROENTEROLOGY | Facility: MEDICAL CENTER | Age: 72
Setting detail: OUTPATIENT SURGERY
Discharge: HOME | End: 2022-06-02
Attending: INTERNAL MEDICINE | Admitting: INTERNAL MEDICINE
Payer: MEDICARE

## 2022-06-02 ENCOUNTER — ANESTHESIA (OUTPATIENT)
Dept: GASTROENTEROLOGY | Facility: MEDICAL CENTER | Age: 72
End: 2022-06-02

## 2022-06-02 VITALS
TEMPERATURE: 98.4 F | SYSTOLIC BLOOD PRESSURE: 133 MMHG | OXYGEN SATURATION: 96 % | RESPIRATION RATE: 18 BRPM | DIASTOLIC BLOOD PRESSURE: 87 MMHG | HEIGHT: 76 IN | BODY MASS INDEX: 31.9 KG/M2 | WEIGHT: 262 LBS | HEART RATE: 63 BPM

## 2022-06-02 DIAGNOSIS — Z12.11 SCREENING FOR COLON CANCER: ICD-10-CM

## 2022-06-02 PROCEDURE — 45385 COLONOSCOPY W/LESION REMOVAL: CPT | Performed by: INTERNAL MEDICINE

## 2022-06-02 PROCEDURE — 88305 TISSUE EXAM BY PATHOLOGIST: CPT | Performed by: PATHOLOGY

## 2022-06-02 PROCEDURE — 45381 COLONOSCOPY SUBMUCOUS NJX: CPT | Performed by: INTERNAL MEDICINE

## 2022-06-02 RX ORDER — SODIUM CHLORIDE 9 MG/ML
125 INJECTION, SOLUTION INTRAVENOUS CONTINUOUS
Status: DISCONTINUED | OUTPATIENT
Start: 2022-06-02 | End: 2022-06-06 | Stop reason: HOSPADM

## 2022-06-02 RX ORDER — PROPOFOL 10 MG/ML
INJECTION, EMULSION INTRAVENOUS AS NEEDED
Status: DISCONTINUED | OUTPATIENT
Start: 2022-06-02 | End: 2022-06-02

## 2022-06-02 RX ORDER — LIDOCAINE HYDROCHLORIDE 20 MG/ML
INJECTION, SOLUTION EPIDURAL; INFILTRATION; INTRACAUDAL; PERINEURAL AS NEEDED
Status: DISCONTINUED | OUTPATIENT
Start: 2022-06-02 | End: 2022-06-02

## 2022-06-02 RX ADMIN — PROPOFOL 30 MG: 10 INJECTION, EMULSION INTRAVENOUS at 08:01

## 2022-06-02 RX ADMIN — PROPOFOL 30 MG: 10 INJECTION, EMULSION INTRAVENOUS at 08:04

## 2022-06-02 RX ADMIN — PROPOFOL 20 MG: 10 INJECTION, EMULSION INTRAVENOUS at 07:38

## 2022-06-02 RX ADMIN — PROPOFOL 80 MG: 10 INJECTION, EMULSION INTRAVENOUS at 07:28

## 2022-06-02 RX ADMIN — PROPOFOL 50 MG: 10 INJECTION, EMULSION INTRAVENOUS at 07:52

## 2022-06-02 RX ADMIN — PROPOFOL 120 MG: 10 INJECTION, EMULSION INTRAVENOUS at 07:23

## 2022-06-02 RX ADMIN — SODIUM CHLORIDE 125 ML/HR: 0.9 INJECTION, SOLUTION INTRAVENOUS at 07:16

## 2022-06-02 RX ADMIN — LIDOCAINE HYDROCHLORIDE 5 ML: 20 INJECTION, SOLUTION EPIDURAL; INFILTRATION; INTRACAUDAL at 07:23

## 2022-06-02 RX ADMIN — PROPOFOL 50 MG: 10 INJECTION, EMULSION INTRAVENOUS at 07:46

## 2022-06-02 RX ADMIN — PROPOFOL 20 MG: 10 INJECTION, EMULSION INTRAVENOUS at 07:41

## 2022-06-02 RX ADMIN — PROPOFOL 20 MG: 10 INJECTION, EMULSION INTRAVENOUS at 08:07

## 2022-06-02 NOTE — H&P
History and Physical - [facility]  Caren Taylor 66 y o  male MRN: [id number]                  HPI: Caren Taylor is a 66y o  year old male who presents for CRC screening, prior polyps      REVIEW OF SYSTEMS: Per the HPI, and otherwise unremarkable  Historical Information   Past Medical History:   Diagnosis Date    Abnormal chest CT     RESOLVED: [id number]    Balance disorder     RESOLVED: [id number]    Colon polyp     Lumbar radiculopathy     RESOLVED: [id number]    Lumbar spondylosis     RESOLVED: [id number]    Melanoma ([address] )      Past Surgical History:   Procedure Laterality Date    CHOLECYSTECTOMY      COLONOSCOPY      OR REPAIR FLEXOR TENDON,HAND,W/O GRAFT,EA Right 8/27/2019    Procedure: REPAIR FLEXOR TENDON right small FINGER;  Surgeon: Juan Cole MD;  Location: [facility];  Service: Orthopedics    TONSILLECTOMY      TOTAL HIP ARTHROPLASTY Left 06/2016     Social History   Social History     Substance and Sexual Activity   Alcohol Use Yes    Comment: DAILY, liquor     Social History     Substance and Sexual Activity   Drug Use Not Currently     Social History     Tobacco Use   Smoking Status Former Smoker    Packs/day: 0 25    Start date: 10/1/2020   Smokeless Tobacco Never Used     Family History   Problem Relation Age of Onset    Hypertension Father     Arthritis Family     Colon polyps Family        Meds/Allergies     (Not in a hospital admission)      Allergies   Allergen Reactions    Atorvastatin Myalgia    Rosuvastatin Myalgia    Venomil Honey Bee Venom [Honey Bee Venom] Swelling       Objective     Blood pressure 133/87, pulse 63, temperature 98 4 °F (36 9 °C), temperature source Temporal, resp  rate 18, height 6' 4" (1 93 m), weight 119 kg (262 lb), SpO2 96 %  PHYSICAL EXAMINATION:    General Appearance:   Alert, cooperative, no distress   HEENT:  Normocephalic, atraumatic, anicteric  Neck supple, symmetrical, trachea midline     Lungs:   Equal chest rise and unlabored breathing, normal effort, no coughing  Cardiovascular:   No visualized JVD  Abdomen:   No abdominal distension  Skin:   No jaundice, rashes, or lesions  Musculoskeletal:   Normal range of motion visualized  Psych:  Normal affect and normal insight  Neuro:  Alert and appropriate  ASSESSMENT/PLAN:  This is a 66y o  year old male here for colonoscopy, and he is stable and optimized for his procedure

## 2022-06-02 NOTE — ANESTHESIA PREPROCEDURE EVALUATION
Procedure:  COLONOSCOPY    Relevant Problems   ANESTHESIA   (+) Motion sickness      CARDIO   (+) Aneurysm of ascending aorta (HCC)   (+) Essential hypertension   (+) Pure hypercholesterolemia      MUSCULOSKELETAL   (+) Idiopathic chronic gout of left ankle without tophus   (+) Osteoarthritis of left hip      NEURO/PSYCH  etoh abuse        Physical Exam    Airway    Mallampati score: II  TM Distance: >3 FB  Neck ROM: full     Dental   No notable dental hx     Cardiovascular  Cardiovascular exam normal    Pulmonary  Pulmonary exam normal     Other Findings        Anesthesia Plan  ASA Score- 3     Anesthesia Type- IV sedation with anesthesia with ASA Monitors  Additional Monitors:   Airway Plan:           Plan Factors-    Chart reviewed  Patient is a current smoker  Patient instructed to abstain from smoking on day of procedure  Patient did not smoke on day of surgery  Obstructive sleep apnea risk education given perioperatively  Induction- intravenous  Postoperative Plan-     Informed Consent- Anesthetic plan and risks discussed with patient

## 2022-06-02 NOTE — ANESTHESIA POSTPROCEDURE EVALUATION
Post-Op Assessment Note    CV Status:  Stable    Pain management: adequate     Mental Status:  Alert and awake   Hydration Status:  Euvolemic   PONV Controlled:  Controlled   Airway Patency:  Patent      Post Op Vitals Reviewed: Yes            No complications documented      BP      Temp      Pulse     Resp      SpO2      /87   Pulse 63   Temp 98 4 °F (36 9 °C) (Temporal)   Resp 18   Ht 6' 4" (1 93 m)   Wt 119 kg (262 lb)   SpO2 96%   BMI 31 89 kg/m²

## 2022-06-06 ENCOUNTER — OFFICE VISIT (OUTPATIENT)
Dept: CARDIAC SURGERY | Facility: CLINIC | Age: 72
End: 2022-06-06
Payer: MEDICARE

## 2022-06-06 VITALS
RESPIRATION RATE: 18 BRPM | OXYGEN SATURATION: 93 % | SYSTOLIC BLOOD PRESSURE: 138 MMHG | WEIGHT: 258 LBS | HEIGHT: 76 IN | DIASTOLIC BLOOD PRESSURE: 79 MMHG | BODY MASS INDEX: 31.42 KG/M2 | HEART RATE: 80 BPM

## 2022-06-06 DIAGNOSIS — I71.2 ANEURYSM OF ASCENDING AORTA (HCC): ICD-10-CM

## 2022-06-06 PROCEDURE — 99204 OFFICE O/P NEW MOD 45 MIN: CPT | Performed by: NURSE PRACTITIONER

## 2022-06-06 RX ORDER — MELATONIN
1000 DAILY
COMMUNITY

## 2022-06-06 NOTE — LETTER
June 6, 2022     Lamar Lala MD  [address]  [address] [facility] [address] [facility] [address]    Patient: Enriqueta Arcos   YOB: 1950   Date of Visit: 6/6/2022       Dear Dr Checo Hernandez: Thank you for referring Elliott Angelo to me for evaluation  Below are my notes for this consultation  If you have questions, please do not hesitate to call me  I look forward to following your patient along with you  Sincerely,        Alexandra Byrd,         CC: No Recipients  MARLEY Gao  6/6/2022 11:12 AM  Attested  A[facility] Clinic  Enriqueta Arcos 66 y o  male MRN: [id number]    Physician Requesting Consult: Nabila Loyd MD    Reason for Consult / Principal Problem: Ascending aortic aneurysm    History of Present Illness: Enriqueta Arcos is a 66y o  year old male who presents today to aortic clinic for consultation regarding his ascending aortic aneurysm  This was initially identified at least as far back as 2014 and measured 44 mm at that time  He has multiple chest CT scans demonstrating ascending aortic dilatation that is stable  Patient's PMHx is notable for HTN, HLD, skin melanoma, OA, lumbar radiculopathy, gout and colon polyps  His BP is reasonably controlled on Olmesartan  He is on Pravastatin for lipid management  He is a former smoker, quit in 2020  He denies a FH of aneurysm or premature SCD  Upon interview patient denies exertional chest pain, SOB, lightheadedness, palpitations, new or unusual back pain, abdominal pain or claudication  He has a splint on his left forearm  He states he sustained a fracture when he slipped and feel while slipping (deneis syncope) which required  ORIF  He states he recently experienced a significant sneezing episode and developed a sharp pain left lateral rib cage       Patient is 6'4" 258 lb    Past Medical History:  Past Medical History:   Diagnosis Date    Abnormal chest CT     RESOLVED: [id number]    Balance disorder     RESOLVED: [id number]    Colon polyp     Lumbar radiculopathy     RESOLVED: [id number]    Lumbar spondylosis     RESOLVED: [id number]    Melanoma ([address] )          Past Surgical History:   Past Surgical History:   Procedure Laterality Date    CHOLECYSTECTOMY      COLONOSCOPY  02/08/2022    ND REPAIR FLEXOR TENDON,HAND,W/O GRAFT,EA Right 08/27/2019    Procedure: REPAIR FLEXOR TENDON right small FINGER;  Surgeon: Syed Ferrer MD;  Location: [facility];  Service: Orthopedics    TONSILLECTOMY      TOTAL HIP ARTHROPLASTY Left 06/2016         Family History:  Family History   Problem Relation Age of Onset    Hypertension Father     Arthritis Family     Colon polyps Family          Social History:    Social History     Substance and Sexual Activity   Alcohol Use Yes    Comment: DAILY, liquor     Social History     Substance and Sexual Activity   Drug Use Not Currently     Social History     Tobacco Use   Smoking Status Former Smoker    Packs/day: 0 25    Start date: 10/1/2020   Smokeless Tobacco Never Used         Home Medications:   Prior to Admission medications    Medication Sig Start Date End Date Taking?  Authorizing Provider   allopurinol (ZYLOPRIM) 100 mg tablet Take 2 tablets (200 mg total) by mouth daily 4/19/22  Yes Carolann Rodriguez MD   Ascorbic Acid (VITAMIN C) 1000 MG tablet Take 1 tablet by mouth daily 11/20/17  Yes Historical Provider, MD   cholecalciferol (VITAMIN D3) 1,000 units tablet Take 1,000 Units by mouth daily   Yes Historical Provider, MD   methylPREDNISolone 4 MG tablet therapy pack Take as directed 11/18/21  Yes Historical Provider, MD   olmesartan (BENICAR) 20 mg tablet Take 1 tablet (20 mg total) by mouth daily 12/13/21  Yes Carolann Rodriguez MD   pravastatin (PRAVACHOL) 10 mg tablet Take 1 tablet (10 mg total) by mouth daily at bedtime 12/13/21  Yes Carolann Rodriguez MD   budesonide (Pulmicort Flexhaler) 180 MCG/ACT inhaler Inhale 4 puffs 2 (two) times a day for 14 days Rinse mouth after use  Patient not taking: Reported on 6/6/2022 1/12/22 1/26/22  Carolann Rodriguez MD   EPINEPHrine (EPIPEN) 0 3 mg/0 3 mL SOAJ Inject 0 3 mL (0 3 mg total) into a muscle once for 1 dose As needed for allergic reaction 10/9/18 10/25/19  Saúl Lynn PA-C   scopolamine (TRANSDERM-SCOP) 1 5 mg/3 days TD 72 hr patch Place 1 patch on the skin every third day  Patient not taking: Reported on 6/6/2022 2/27/20   Carolann Rodriguez MD       Allergies: Allergies   Allergen Reactions    Atorvastatin Myalgia    Rosuvastatin Myalgia    Venomil Honey Bee Venom [Honey Bee Venom] Swelling       Review of Systems:   Review of Systems - History obtained from chart review and the patient  General ROS: negative  Psychological ROS: negative  Ophthalmic ROS: positive for - uses glasses  ENT ROS: negative  Allergy and Immunology ROS: negative  Hematological and Lymphatic ROS: negative  Endocrine ROS: negative  Breast ROS: negative  Respiratory ROS: no cough, shortness of breath, or wheezing  Cardiovascular ROS: no chest pain or dyspnea on exertion  Gastrointestinal ROS: no abdominal pain, change in bowel habits, or black or bloody stools  Genito-Urinary ROS: no dysuria, trouble voiding, or hematuria  Musculoskeletal ROS: positive for - recent left wrist fracture- wearing splint (S/P ORIF); arthralgias  negative for - muscular weakness  Neurological ROS: no TIA or stroke symptoms  Dermatological ROS: negative    Vital Signs:   Vitals:    06/06/22 1024   BP: 138/79   BP Location: Left arm   Patient Position: Sitting   Cuff Size: Standard   Pulse: 80   Resp: 18   SpO2: 93%   Weight: 117 kg (258 lb)   Height: 6' 4" (1 93 m)       Physical Exam:  General: Alert, oriented, large stature, no acute distress  HEENT/NECK:  PERRLA  No jugular venous distention  Cardiac:Regular rate and rhythm, no murmurs rubs or gallops  Carotid arteries: 2+ pulses, no bruits  Pulmonary:  Breath sounds clear bilaterally    Abdomen: Non-tender, Non-distended  Positive bowel sounds  Upper extremities: Right radial pulse 2+; left forearm splinted; brick capillary refill  Lower extremities: Extremities warm/dry  PT/DP pulses 2+ bilaterally  No edema B/L  Neuro: Alert and oriented X 3  Sensation is grossly intact  No focal deficits  Musculoskeletal: MAEE, stable gait  Skin: Warm/Dry, without rashes or lesions  Lab Results:   5/2/22  2:00 PM     Glucose 65 - 99 mg/dL 121 High     Comment: Specimen slightly hemolyzed, results may be affected  BUN 7 - 28 mg/dL 29 High     Comment: Specimen slightly hemolyzed, results may be affected  Creatinine 0 53 - 1 30 mg/dL 1 01    Comment: Specimen slightly hemolyzed, results may be affected  Sodium 135 - 145 mmol/L 135    Comment: Specimen slightly hemolyzed, results may be affected  Potassium 3 5 - 5 2 mmol/L 4 4    Comment: Specimen slightly hemolyzed, results may be affected  Chloride 100 - 109 mmol/L 105    Comment: Specimen slightly hemolyzed, results may be affected  Carbon Dioxide 23 - 31 mmol/L 26    Comment: Specimen slightly hemolyzed, results may be affected  Calcium 8 5 - 10 1 mg/dL 10 2 High     Comment: Specimen slightly hemolyzed, results may be affected  Alkaline Phosphatase 35 - 120 U/L 77    Comment: Specimen slightly hemolyzed, results may be affected  Albumin 3 5 - 4 8 g/dL 3 5    Comment: Specimen slightly hemolyzed, results may be affected  Bilirubin, Total 0 2 - 1 0 mg/dL 0 6    Comment: Use of this assay is not recommended for patients undergoing treatment with eltrombopag due to the potential for falsely elevated results  Specimen slightly hemolyzed, results may be affected  Protein, Total 6 3 - 8 3 g/dL 7 4    Comment: Specimen slightly hemolyzed, results may be affected  AST <41 U/L 36    Comment: Specimen slightly hemolyzed, results may be affected  ALT <56 U/L 53    Comment: Specimen slightly hemolyzed, results may be affected     Anion Gap 3 - 11 4    Comment: Specimen slightly hemolyzed, results may be affected  Lab Results   Component Value Date    TROPONINI <0 02 10/08/2018       Imaging Studies:     CT Chest:   10/1/14: Asc 44 mm  10/14/15: Asc 43 mm  10/27/16: Asc 43 mm  10/18/17: Asc 43  10/8/18: CTA dissection protocol: Asc 43 mm  12/23/19: aswc 43 mm  9/26/20: Asc 42 x 43  12/30/21: Asc 44 x 45 mm    Echocardiogram: 10/18/17  EF 65%  Aortic Valve Trileaflet  No AS, No AI  Aortic root normal size  I have personally reviewed pertinent films in PACS     PCP notes reviewed       Assessment:  Patient Active Problem List    Diagnosis Date Noted    COVID-19 01/12/2022    Hypercalcemia 12/13/2021    Subacromial bursitis of right shoulder joint 05/31/2019    Anaphylaxis 10/09/2018    History of tobacco abuse 10/08/2018    Elevated PSA 10/12/2017    Colonic polyp 09/20/2017    History of left hip replacement 09/20/2017    Alcohol consumption of more than two drinks per day 06/23/2016    Osteoarthritis of left hip 06/23/2016    Motion sickness 07/07/2015    Malignant melanoma of skin [facility] [address] ) 06/15/2015    Aneurysm of ascending aorta ([facility] [address] ) 10/02/2014    Idiopathic chronic gout of left ankle without tophus 09/26/2014    Essential hypertension 05/19/2014    Pure hypercholesterolemia 12/10/2012       Impression/Plan:    Stable ascending aortic aneurysm;  dating back to 2014 at which time maximal ascending aortic diameter measured 43-44 mm  Current CT scan in [facility] 2021 demonstrates stable findings of 44 x 45 mm  These findings were confirmed and shared with the patient today        Patient does not meet surgical criteria:  Ascending aortic aneurysm surgical triggers:  * 4 5 cm or > in the setting of + FH of rupture or dissection  * 5 cm with moderate or worse aortic valve disease  *  5 5 cm regardless of aortic valve function and without genetically associated aortic disease   *  Aortic growth > 4mm / year  *  Bicuspid aortic valve with valve dysfunction enough to meet indications for surgery and concomitant ascending aortic aneurysm 4 5 cm or >  * 4 5 cm or > in setting of existing connective tissue disease of Marfan's syndrome, Isauro-Danlos syndrome or familiar aneurysms syndrome      Patient remains asymptomatic  He is on appropriate medical management for HTN and HLD  He has no contributing family history  Follow-up monitoring is the treatment plan  Arrangements have been made for future surveillance to be completed with CT chest, without contrast in 1 year  Claudia Maurice was comfortable with our recommendations, and their questions were answered to their satisfaction  Thank you for allowing us to participate in the care of this patient  Aortic Aneurysm Instructions were provided to the patient as follows:    1  No lifting more than 50 pounds  Regular aerobic exercise permitted and recommended  2  Maintain a controlled blood pressure with a goal of less than 120/80  3  Follow up in Aortic Clinic as recommended with radiology follow up as instructed  4  Report to the ER or call 911 immediately with the following signs / symptoms: sudden onset of back pain, chest pain or shortness of breath  5  Remain tobacco free  The patient recently had a screening colonoscopy in 2/8/22  Therefore GI referral is not indicated at this time  MARLEY Ornelas  DATE: June 6, 2022  TIME: 10:50 AM  Attestation signed by Sowmya Gee DO at 6/6/2022 11:18 AM:  Mr Colt Anglin is here for evaluation of a 44mm ascending aortic aneurysm  All imaging was reviewed by me personally  He has no personal or family history of aneurysm  He has well controlled hypertension and does not smoke  TTE from 2019 showed a tri-leaflet aortic valve with trace AI  Based on these findings, I recommend continued smoking cessation, blood pressure control, surveillance and aortic precautions    SBP goal should be 110s-120s with resting heart rate goal 60-70  The patient will return to the office in 1 year for review of surveillance imaging

## 2022-10-28 ENCOUNTER — IMMUNIZATIONS (OUTPATIENT)
Dept: FAMILY MEDICINE CLINIC | Facility: CLINIC | Age: 72
End: 2022-10-28

## 2022-10-28 DIAGNOSIS — Z23 NEED FOR INFLUENZA VACCINATION: Primary | ICD-10-CM

## 2023-02-06 DIAGNOSIS — I10 ESSENTIAL HYPERTENSION: ICD-10-CM

## 2023-02-08 RX ORDER — OLMESARTAN MEDOXOMIL 20 MG/1
TABLET ORAL
Qty: 90 TABLET | Refills: 3 | Status: SHIPPED | OUTPATIENT
Start: 2023-02-08

## 2023-05-04 ENCOUNTER — RA CDI HCC (OUTPATIENT)
Dept: OTHER | Facility: HOSPITAL | Age: 73
End: 2023-05-04

## 2023-05-04 NOTE — PROGRESS NOTES
[address]  coding opportunities       Chart reviewed, no opportunity found: CHART REVIEWED, NO OPPORTUNITY FOUND        Patients Insurance     Medicare Insurance: Medicare

## 2023-05-10 ENCOUNTER — OFFICE VISIT (OUTPATIENT)
Dept: FAMILY MEDICINE CLINIC | Facility: CLINIC | Age: 73
End: 2023-05-10

## 2023-05-10 VITALS
WEIGHT: 258 LBS | HEART RATE: 53 BPM | SYSTOLIC BLOOD PRESSURE: 170 MMHG | BODY MASS INDEX: 31.42 KG/M2 | TEMPERATURE: 97.5 F | HEIGHT: 76 IN | DIASTOLIC BLOOD PRESSURE: 100 MMHG

## 2023-05-10 DIAGNOSIS — E83.52 HYPERCALCEMIA: ICD-10-CM

## 2023-05-10 DIAGNOSIS — I10 ESSENTIAL HYPERTENSION: Primary | ICD-10-CM

## 2023-05-10 DIAGNOSIS — I71.21 ANEURYSM OF ASCENDING AORTA WITHOUT RUPTURE (HCC): ICD-10-CM

## 2023-05-10 DIAGNOSIS — M1A.0720 IDIOPATHIC CHRONIC GOUT OF LEFT ANKLE WITHOUT TOPHUS: ICD-10-CM

## 2023-05-10 DIAGNOSIS — R60.0 BILATERAL LEG EDEMA: ICD-10-CM

## 2023-05-10 DIAGNOSIS — I71.22 ANEURYSM OF AORTIC ARCH WITHOUT RUPTURE (HCC): ICD-10-CM

## 2023-05-10 DIAGNOSIS — E78.00 PURE HYPERCHOLESTEROLEMIA: ICD-10-CM

## 2023-05-10 DIAGNOSIS — Z12.11 COLON CANCER SCREENING: ICD-10-CM

## 2023-05-10 DIAGNOSIS — R07.2 PRECORDIAL PAIN: ICD-10-CM

## 2023-05-10 DIAGNOSIS — R97.20 ELEVATED PSA: ICD-10-CM

## 2023-05-10 DIAGNOSIS — C43.9 MALIGNANT MELANOMA OF SKIN (HCC): ICD-10-CM

## 2023-05-10 RX ORDER — HYDROCHLOROTHIAZIDE 25 MG/1
25 TABLET ORAL DAILY
Qty: 90 TABLET | Refills: 3 | Status: SHIPPED | OUTPATIENT
Start: 2023-05-10

## 2023-05-10 NOTE — PROGRESS NOTES
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)       Musculoskeletal and Integument    Idiopathic chronic gout of left ankle without tophus    Relevant Orders    Uric acid (Completed)    Malignant melanoma of skin (HCC)       Other    Pure hypercholesterolemia    Relevant Orders    CBC and differential (Completed)    Comprehensive metabolic panel (Completed)    Elevated PSA    Precordial pain    Relevant Orders    POCT ECG (Completed)    CBC and differential (Completed)    Comprehensive metabolic panel (Completed)    Echo stress test, exercise    Hypercalcemia    Relevant Orders    Comprehensive metabolic panel (Completed)    Vitamin D 25 hydroxy (Completed)    PTH, intact (Completed)   Other Visit Diagnoses     Colon cancer screening        Relevant Orders    Ambulatory referral for colonoscopy    Bilateral leg edema        Relevant Medications    hydrochlorothiazide (HYDRODIURIL) 25 mg tablet    Other Relevant Orders    CBC and differential (Completed)    Comprehensive metabolic panel (Completed)    TSH, 3rd generation with Free T4 reflex (Completed)    NT-BNP [facility] only (Completed)    D-dimer, quantitative (Completed)    VAS lower limb venous duplex study, complete bilateral           Preventive health issues were discussed with patient, and age appropriate screening tests were ordered as noted in patient's After Visit Summary    Personalized health advice and appropriate referrals for health education or preventive services given if needed, as noted in patient's After Visit Summary       History of Present Illness:     Patient presents for a Medicare Wellness Visit    HPI   Patient Care Team:  Sharon Talbot MD as PCP - General  MD Sue Flores DO Jerrel Pu DO     Review of Systems:     Review of Systems     Problem List:     Patient Active Problem List   Diagnosis   • Alcohol consumption of more than two drinks per day   • Aneurysm of ascending aorta [facility])   • Colonic polyp   • Idiopathic chronic gout of left ankle without tophus   • Pure hypercholesterolemia   • Essential hypertension   • Malignant melanoma of skin ([facility] [address] )   • Motion sickness   • Osteoarthritis of left hip   • Elevated PSA   • History of left hip replacement   • Precordial pain   • History of tobacco abuse   • Anaphylaxis   • Subacromial bursitis of right shoulder joint   • Hypercalcemia   • COVID-19   • 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[facility])      Past Medical and Surgical History:     Past Medical History:   Diagnosis Date   • Abnormal chest CT     RESOLVED: [id number]   • Balance disorder     RESOLVED: [id number]   • Colon polyp    • Lumbar radiculopathy     RESOLVED: [id number]   • Lumbar spondylosis     RESOLVED: [id number]   • [facility]      Past Surgical History:   Procedure Laterality Date   • CHOLECYSTECTOMY     • COLONOSCOPY  02/08/2022   • NE RPR/ADVMNT FLXR TDN N/Z/2 W/O FR GRAFT EA TENDON Right 08/27/2019    Procedure: REPAIR FLEXOR TENDON right small FINGER;  Surgeon: Linda Fernandes MD;  Location: [facility] OR;  Service: Orthopedics   • TONSILLECTOMY     • TOTAL HIP ARTHROPLASTY Left 06/2016      Family History:     Family History   Problem Relation Age of Onset   • Hypertension Father    • Arthritis Family    • Colon polyps Family       Social History:     Social History     Socioeconomic History   • Marital status: /Civil Union     Spouse name: None   • Number of children: None   • Years of education: None   • Highest education level: None   Occupational History   • None   Tobacco Use   • Smoking status: Former     Packs/day: 0 25     Types: Cigarettes     Start date: 10/1/2020   • Smokeless tobacco: Never   Vaping Use   • Vaping Use: Never used   Substance and Sexual Activity   • Alcohol use: Yes     Comment: DAILY, liquor   • Drug use: Not Currently   • Sexual activity: None   Other Topics Concern   • None   Social History Narrative   • None     Social Determinants of Health     Financial Resource Strain: Not on file   Food Insecurity: Not on file   Transportation Needs: Not on file   Physical Activity: Not on file   Stress: Not on file   Social Connections: Not on file   Intimate Partner Violence: Not on file   Housing Stability: Not on file      Medications and Allergies:     Current Outpatient Medications   Medication Sig Dispense Refill   • allopurinol (ZYLOPRIM) 100 mg tablet Take 2 tablets (200 mg total) by mouth daily 180 tablet 3   • Ascorbic Acid (VITAMIN C) 1000 MG tablet Take 1 tablet by mouth daily     • cholecalciferol (VITAMIN D3) 1,000 units tablet Take 1,000 Units by mouth daily     • EPINEPHrine (EPIPEN) 0 3 mg/0 3 mL SOAJ Inject 0 3 mL (0 3 mg total) into a muscle once for 1 dose As needed for allergic reaction 0 3 mL 0   • hydrochlorothiazide (HYDRODIURIL) 25 mg tablet Take 1 tablet (25 mg total) by mouth daily 90 tablet 3   • olmesartan (BENICAR) 20 mg tablet TAKE 1 TABLET(20 MG) BY MOUTH DAILY 90 tablet 3   • scopolamine (TRANSDERM-SCOP) 1 5 mg/3 days TD 72 hr patch Place 1 patch on the skin every third day 10 patch 1     No current facility-administered medications for this visit       Allergies   Allergen Reactions   • Atorvastatin Myalgia   • Honey Bee Venom Swelling   • Rosuvastatin Myalgia      Immunizations:     Immunization History   Administered Date(s) Administered   • COVID-19 MODERNA VACC 0 5 ML IM 01/27/2021, 02/24/2021   • COVID-19, unspecified 01/27/2021, 02/24/2021   • H1N1, All Formulations 01/06/2010, 11/30/2010   • INFLUENZA 11/18/2013, 10/28/2022   • Influenza Split High Dose Preservative Free IM 09/26/2014, 10/07/2015, 10/14/2016, 09/20/2017   • Influenza, high dose seasonal 0 7 mL 10/01/2018, 10/25/2019, 10/09/2020, 11/26/2021, 10/28/2022   • Influenza, seasonal, injectable 11/13/2006, 10/15/2009   • Pneumococcal Conjugate 13-Valent 10/14/2016   • Pneumococcal Polysaccharide PPV23 12/07/2017   • Zoster 12/19/2011, 11/18/2013      Health Maintenance:         Topic Date Due   • Colorectal Cancer Screening  06/02/2023   • Hepatitis C Screening  Completed         Topic Date Due   • COVID-19 Vaccine (5 - Booster) 04/21/2021      Medicare Screening Tests and Risk Assessments:     Glenis Pimentel is here for his Subsequent Wellness visit  Health Risk Assessment:   Patient rates overall health as good  Patient feels that their physical health rating is slightly worse  Patient is satisfied with their life  Eyesight was rated as same  Hearing was rated as same  Patient feels that their emotional and mental health rating is same  Patients states they are never, rarely angry  Patient states they are sometimes unusually tired/fatigued  Pain experienced in the last 7 days has been none  Patient states that he has experienced no weight loss or gain in last 6 months  Fall Risk Screening: In the past year, patient has experienced: no history of falling in past year      Home Safety:  Patient does not have trouble with stairs inside or outside of their home  Patient has working smoke alarms and has working carbon monoxide detector  Home safety hazards include: none  Nutrition:   Current diet is Low Carb  Medications:   Patient is currently taking over-the-counter supplements  OTC medications include: see medication list  Patient is able to manage medications       Activities of Daily Living (ADLs)/Instrumental Activities of Daily Living (IADLs):   Walk and transfer "into and out of bed and chair?: Yes  Dress and groom yourself?: Yes    Bathe or shower yourself?: Yes    Feed yourself? Yes  Do your laundry/housekeeping?: Yes  Manage your money, pay your bills and track your expenses?: Yes  Make your own meals?: Yes    Do your own shopping?: Yes    Previous Hospitalizations:   Any hospitalizations or ED visits within the last 12 months?: No      Advance Care Planning:   Living will: No    Durable POA for healthcare: No    Advanced directive: No    Five wishes given: Yes      PREVENTIVE SCREENINGS      Cardiovascular Screening:    General: Screening Not Indicated and History Lipid Disorder      Colorectal Cancer Screening:     General: Screening Current      Abdominal Aortic Aneurysm (AAA) Screening:    Risk factors include: age between 73-67 yo and tobacco use        Lung Cancer Screening:     General: Screening Not Indicated      Hepatitis C Screening:    General: Screening Current    Screening, Brief Intervention, and Referral to Treatment (SBIRT)    Screening  Typical number of drinks in a day: 0  Typical number of drinks in a week: 10  Interpretation: Low risk drinking behavior  Single Item Drug Screening:  How often have you used an illegal drug (including marijuana) or a prescription medication for non-medical reasons in the past year? never    Single Item Drug Screen Score: 0  Interpretation: Negative screen for possible drug use disorder    No results found       Physical Exam:     /100 (BP Location: Left arm, Patient Position: Sitting, Cuff Size: Large)   Pulse (!) 53   Temp 97 5 °F (36 4 °C)   Ht 6' 4\" (1 93 m)   Wt 117 kg (258 lb)   BMI 31 40 kg/m²     Physical Exam     Daria Garzon MD  "

## 2023-05-10 NOTE — PATIENT INSTRUCTIONS
Medicare Preventive Visit Patient Instructions  Thank you for completing your Welcome to Medicare Visit or Medicare Annual Wellness Visit today  Your next wellness visit will be due in one year (5/10/2024)  The screening/preventive services that you may require over the next 5-10 years are detailed below  Some tests may not apply to you based off risk factors and/or age  Screening tests ordered at today's visit but not completed yet may show as past due  Also, please note that scanned in results may not display below  Preventive Screenings:  Service Recommendations Previous Testing/Comments   Colorectal Cancer Screening  · Colonoscopy    · Fecal Occult Blood Test (FOBT)/Fecal Immunochemical Test (FIT)  · Fecal DNA/Cologuard Test  · Flexible Sigmoidoscopy Age: 39-70 years old   Colonoscopy: every 10 years (May be performed more frequently if at higher risk)  OR  FOBT/FIT: every 1 year  OR  Cologuard: every 3 years  OR  Sigmoidoscopy: every 5 years  Screening may be recommended earlier than age 39 if at higher risk for colorectal cancer  Also, an individualized decision between you and your healthcare provider will decide whether screening between the ages of 74-80 would be appropriate   Colonoscopy: 06/02/2022  FOBT/FIT: Not on file  Cologuard: Not on file  Sigmoidoscopy: Not on file    Screening Current     Prostate Cancer Screening Individualized decision between patient and health care provider in men between ages of 53-78   Medicare will cover every 12 months beginning on the day after your 50th birthday PSA: 3 5 ng/mL           Hepatitis C Screening Once for adults born between 1945 and 1965  More frequently in patients at high risk for Hepatitis C Hep C Antibody: 10/03/2018    Screening Current   Diabetes Screening 1-2 times per year if you're at risk for diabetes or have pre-diabetes Fasting glucose: 94 mg/dL (12/15/2021)  A1C: No results in last 5 years (No results in last 5 years)      Cholesterol Screening Once every 5 years if you don't have a lipid disorder  May order more often based on risk factors  Lipid panel: 11/30/2021  Screening Not Indicated  History Lipid Disorder      Other Preventive Screenings Covered by Medicare:  1  Abdominal Aortic Aneurysm (AAA) Screening: covered once if your at risk  You're considered to be at risk if you have a family history of AAA or a male between the age of 73-68 who smoking at least 100 cigarettes in your lifetime  2  Lung Cancer Screening: covers low dose CT scan once per year if you meet all of the following conditions: (1) Age 50-69; (2) No signs or symptoms of lung cancer; (3) Current smoker or have quit smoking within the last 15 years; (4) You have a tobacco smoking history of at least 20 pack years (packs per day x number of years you smoked); (5) You get a written order from a healthcare provider  3  Glaucoma Screening: covered annually if you're considered high risk: (1) You have diabetes OR (2) Family history of glaucoma OR (3)  aged 48 and older OR (3)  American aged 72 and older  3  Osteoporosis Screening: covered every 2 years if you meet one of the following conditions: (1) Have a vertebral abnormality; (2) On glucocorticoid therapy for more than 3 months; (3) Have primary hyperparathyroidism; (4) On osteoporosis medications and need to assess response to drug therapy  5  HIV Screening: covered annually if you're between the age of 12-76  Also covered annually if you are younger than 13 and older than 72 with risk factors for HIV infection  For pregnant patients, it is covered up to 3 times per pregnancy      Immunizations:  Immunization Recommendations   Influenza Vaccine Annual influenza vaccination during flu season is recommended for all persons aged >= 6 months who do not have contraindications   Pneumococcal Vaccine   * Pneumococcal conjugate vaccine = PCV13 (Prevnar 13), PCV15 (Vaxneuvance), PCV20 (Prevnar 20)  * Pneumococcal polysaccharide vaccine = PPSV23 (Pneumovax) Adults 2364 years old: 1-3 doses may be recommended based on certain risk factors  Adults 72 years old: 1-2 doses may be recommended based off what pneumonia vaccine you previously received   Hepatitis B Vaccine 3 dose series if at intermediate or high risk (ex: diabetes, end stage renal disease, liver disease)   Tetanus (Td) Vaccine - COST NOT COVERED BY MEDICARE PART B Following completion of primary series, a booster dose should be given every 10 years to maintain immunity against tetanus  Td may also be given as tetanus wound prophylaxis  Tdap Vaccine - COST NOT COVERED BY MEDICARE PART B Recommended at least once for all adults  For pregnant patients, recommended with each pregnancy  Shingles Vaccine (Shingrix) - COST NOT COVERED BY MEDICARE PART B  2 shot series recommended in those aged 48 and above     Health Maintenance Due:      Topic Date Due   • Colorectal Cancer Screening  06/02/2023   • Hepatitis C Screening  Completed     Immunizations Due:      Topic Date Due   • COVID-19 Vaccine (5 - Booster) 04/21/2021     Advance Directives   What are advance directives? Advance directives are legal documents that state your wishes and plans for medical care  These plans are made ahead of time in case you lose your ability to make decisions for yourself  Advance directives can apply to any medical decision, such as the treatments you want, and if you want to donate organs  What are the types of advance directives? There are many types of advance directives, and each state has rules about how to use them  You may choose a combination of any of the following:  · Living will: This is a written record of the treatment you want  You can also choose which treatments you do not want, which to limit, and which to stop at a certain time  This includes surgery, medicine, IV fluid, and tube feedings     · Durable power of  for healthcare [facility]): This is a written record that states who you want to make healthcare choices for you when you are unable to make them for yourself  This person, called a proxy, is usually a family member or a friend  You may choose more than 1 proxy  · Do not resuscitate (DNR) order:  A DNR order is used in case your heart stops beating or you stop breathing  It is a request not to have certain forms of treatment, such as CPR  A DNR order may be included in other types of advance directives  · Medical directive: This covers the care that you want if you are in a coma, near death, or unable to make decisions for yourself  You can list the treatments you want for each condition  Treatment may include pain medicine, surgery, blood transfusions, dialysis, IV or tube feedings, and a ventilator (breathing machine)  · Values history: This document has questions about your views, beliefs, and how you feel and think about life  This information can help others choose the care that you would choose  Why are advance directives important? An advance directive helps you control your care  Although spoken wishes may be used, it is better to have your wishes written down  Spoken wishes can be misunderstood, or not followed  Treatments may be given even if you do not want them  An advance directive may make it easier for your family to make difficult choices about your care  Weight Management   Why it is important to manage your weight:  Being overweight increases your risk of health conditions such as heart disease, high blood pressure, type 2 diabetes, and certain types of cancer  It can also increase your risk for osteoarthritis, sleep apnea, and other respiratory problems  Aim for a slow, steady weight loss  Even a small amount of weight loss can lower your risk of health problems  How to lose weight safely:  A safe and healthy way to lose weight is to eat fewer calories and get regular exercise   You can lose up about 1 pound a week by decreasing the number of calories you eat by 500 calories each day  Healthy meal plan for weight management:  A healthy meal plan includes a variety of foods, contains fewer calories, and helps you stay healthy  A healthy meal plan includes the following:  · Eat whole-grain foods more often  A healthy meal plan should contain fiber  Fiber is the part of grains, fruits, and vegetables that is not broken down by your body  Whole-grain foods are healthy and provide extra fiber in your diet  Some examples of whole-grain foods are whole-wheat breads and pastas, oatmeal, brown rice, and bulgur  · Eat a variety of vegetables every day  Include dark, leafy greens such as spinach, kale, sanjay greens, and mustard greens  Eat yellow and orange vegetables such as carrots, sweet potatoes, and winter squash  · Eat a variety of fruits every day  Choose fresh or canned fruit (canned in its own juice or light syrup) instead of juice  Fruit juice has very little or no fiber  · Eat low-fat dairy foods  Drink fat-free (skim) milk or 1% milk  Eat fat-free yogurt and low-fat cottage cheese  Try low-fat cheeses such as mozzarella and other reduced-fat cheeses  · Choose meat and other protein foods that are low in fat  Choose beans or other legumes such as split peas or lentils  Choose fish, skinless poultry (chicken or turkey), or lean cuts of red meat (beef or pork)  Before you cook meat or poultry, cut off any visible fat  · Use less fat and oil  Try baking foods instead of frying them  Add less fat, such as margarine, sour cream, regular salad dressing and mayonnaise to foods  Eat fewer high-fat foods  Some examples of high-fat foods include french fries, doughnuts, ice cream, and cakes  · Eat fewer sweets  Limit foods and drinks that are high in sugar  This includes candy, cookies, regular soda, and sweetened drinks  Exercise:  Exercise at least 30 minutes per day on most days of the week   Some examples of exercise include walking, biking, dancing, and swimming  You can also fit in more physical activity by taking the stairs instead of the elevator or parking farther away from stores  Ask your healthcare provider about the best exercise plan for you  © Copyright [employer] 2018 Information is for End User's use only and may not be sold, redistributed or otherwise used for commercial purposes   All illustrations and images included in CareNotes® are the copyrighted property of [employer] M , [employer]  or [address] [place] [address]

## 2023-05-11 ENCOUNTER — APPOINTMENT (OUTPATIENT)
Dept: LAB | Facility: MEDICAL CENTER | Age: 73
End: 2023-05-11

## 2023-05-11 DIAGNOSIS — E78.00 PURE HYPERCHOLESTEROLEMIA: ICD-10-CM

## 2023-05-11 DIAGNOSIS — M1A.0720 IDIOPATHIC CHRONIC GOUT OF LEFT ANKLE WITHOUT TOPHUS: ICD-10-CM

## 2023-05-11 DIAGNOSIS — I10 ESSENTIAL HYPERTENSION: ICD-10-CM

## 2023-05-11 DIAGNOSIS — R60.0 BILATERAL LEG EDEMA: ICD-10-CM

## 2023-05-11 DIAGNOSIS — E83.52 HYPERCALCEMIA: ICD-10-CM

## 2023-05-11 DIAGNOSIS — R07.2 PRECORDIAL PAIN: ICD-10-CM

## 2023-05-11 LAB
25(OH)D3 SERPL-MCNC: 23 NG/ML (ref 30–100)
ALBUMIN SERPL BCP-MCNC: 3.8 G/DL (ref 3.5–5)
ALP SERPL-CCNC: 76 U/L (ref 46–116)
ALT SERPL W P-5'-P-CCNC: 51 U/L (ref 12–78)
ANION GAP SERPL CALCULATED.3IONS-SCNC: 2 MMOL/L (ref 4–13)
AST SERPL W P-5'-P-CCNC: 30 U/L (ref 5–45)
BASOPHILS # BLD AUTO: 0.07 THOUSANDS/ÂΜL (ref 0–0.1)
BASOPHILS NFR BLD AUTO: 2 % (ref 0–1)
BILIRUB SERPL-MCNC: 0.6 MG/DL (ref 0.2–1)
BUN SERPL-MCNC: 17 MG/DL (ref 5–25)
CALCIUM SERPL-MCNC: 9.5 MG/DL (ref 8.3–10.1)
CHLORIDE SERPL-SCNC: 108 MMOL/L (ref 96–108)
CHOLEST SERPL-MCNC: 210 MG/DL
CO2 SERPL-SCNC: 27 MMOL/L (ref 21–32)
CREAT SERPL-MCNC: 1.02 MG/DL (ref 0.6–1.3)
D DIMER PPP FEU-MCNC: 0.76 UG/ML FEU
EOSINOPHIL # BLD AUTO: 0.18 THOUSAND/ÂΜL (ref 0–0.61)
EOSINOPHIL NFR BLD AUTO: 4 % (ref 0–6)
ERYTHROCYTE [DISTWIDTH] IN BLOOD BY AUTOMATED COUNT: 13 % (ref 11.6–15.1)
GFR SERPL CREATININE-BSD FRML MDRD: 72 ML/MIN/1.73SQ M
GLUCOSE P FAST SERPL-MCNC: 97 MG/DL (ref 65–99)
HCT VFR BLD AUTO: 45.7 % (ref 36.5–49.3)
HDLC SERPL-MCNC: 63 MG/DL
HGB BLD-MCNC: 15.1 G/DL (ref 12–17)
IMM GRANULOCYTES # BLD AUTO: 0.02 THOUSAND/UL (ref 0–0.2)
IMM GRANULOCYTES NFR BLD AUTO: 0 % (ref 0–2)
LDLC SERPL CALC-MCNC: 116 MG/DL (ref 0–100)
LYMPHOCYTES # BLD AUTO: 1.78 THOUSANDS/ÂΜL (ref 0.6–4.47)
LYMPHOCYTES NFR BLD AUTO: 38 % (ref 14–44)
MCH RBC QN AUTO: 31.1 PG (ref 26.8–34.3)
MCHC RBC AUTO-ENTMCNC: 33 G/DL (ref 31.4–37.4)
MCV RBC AUTO: 94 FL (ref 82–98)
MONOCYTES # BLD AUTO: 0.52 THOUSAND/ÂΜL (ref 0.17–1.22)
MONOCYTES NFR BLD AUTO: 11 % (ref 4–12)
NEUTROPHILS # BLD AUTO: 2.14 THOUSANDS/ÂΜL (ref 1.85–7.62)
NEUTS SEG NFR BLD AUTO: 45 % (ref 43–75)
NRBC BLD AUTO-RTO: 0 /100 WBCS
NT-PROBNP SERPL-MCNC: 291 PG/ML
PLATELET # BLD AUTO: 169 THOUSANDS/UL (ref 149–390)
PMV BLD AUTO: 10.2 FL (ref 8.9–12.7)
POTASSIUM SERPL-SCNC: 4.6 MMOL/L (ref 3.5–5.3)
PROT SERPL-MCNC: 7.3 G/DL (ref 6.4–8.4)
PTH-INTACT SERPL-MCNC: 120.2 PG/ML (ref 18.4–80.1)
RBC # BLD AUTO: 4.85 MILLION/UL (ref 3.88–5.62)
SODIUM SERPL-SCNC: 137 MMOL/L (ref 135–147)
TRIGL SERPL-MCNC: 156 MG/DL
TSH SERPL DL<=0.05 MIU/L-ACNC: 3.94 UIU/ML (ref 0.45–4.5)
URATE SERPL-MCNC: 6 MG/DL (ref 3.5–8.5)
WBC # BLD AUTO: 4.71 THOUSAND/UL (ref 4.31–10.16)

## 2023-05-12 ENCOUNTER — OFFICE VISIT (OUTPATIENT)
Dept: FAMILY MEDICINE CLINIC | Facility: CLINIC | Age: 73
End: 2023-05-12

## 2023-05-12 ENCOUNTER — HOSPITAL ENCOUNTER (OUTPATIENT)
Dept: NON INVASIVE DIAGNOSTICS | Facility: HOSPITAL | Age: 73
Discharge: HOME | End: 2023-05-12

## 2023-05-12 VITALS
BODY MASS INDEX: 31.42 KG/M2 | TEMPERATURE: 97.5 F | DIASTOLIC BLOOD PRESSURE: 82 MMHG | HEIGHT: 76 IN | HEART RATE: 65 BPM | OXYGEN SATURATION: 93 % | WEIGHT: 258 LBS | SYSTOLIC BLOOD PRESSURE: 124 MMHG

## 2023-05-12 DIAGNOSIS — I71.22 ANEURYSM OF AORTIC ARCH WITHOUT RUPTURE (HCC): ICD-10-CM

## 2023-05-12 DIAGNOSIS — E21.3 HYPERPARATHYROIDISM (HCC): ICD-10-CM

## 2023-05-12 DIAGNOSIS — I71.21 ANEURYSM OF ASCENDING AORTA WITHOUT RUPTURE (HCC): ICD-10-CM

## 2023-05-12 DIAGNOSIS — I71.20 THORACIC AORTIC ANEURYSM WITHOUT RUPTURE, UNSPECIFIED PART (HCC): ICD-10-CM

## 2023-05-12 DIAGNOSIS — R60.0 BILATERAL LEG EDEMA: ICD-10-CM

## 2023-05-12 DIAGNOSIS — R79.89 ELEVATED BRAIN NATRIURETIC PEPTIDE (BNP) LEVEL: Primary | ICD-10-CM

## 2023-05-12 DIAGNOSIS — E78.00 PURE HYPERCHOLESTEROLEMIA: ICD-10-CM

## 2023-05-12 DIAGNOSIS — R79.89 ELEVATED D-DIMER: ICD-10-CM

## 2023-05-12 DIAGNOSIS — E83.52 HYPERCALCEMIA: ICD-10-CM

## 2023-05-12 DIAGNOSIS — T78.2XXD ANAPHYLAXIS, SUBSEQUENT ENCOUNTER: ICD-10-CM

## 2023-05-12 DIAGNOSIS — Z12.5 PROSTATE CANCER SCREENING: ICD-10-CM

## 2023-05-12 DIAGNOSIS — I10 ESSENTIAL HYPERTENSION: ICD-10-CM

## 2023-05-12 DIAGNOSIS — R97.20 ELEVATED PSA: ICD-10-CM

## 2023-05-12 PROBLEM — R07.2 PRECORDIAL PAIN: Status: RESOLVED | Noted: 2018-10-08 | Resolved: 2023-05-12

## 2023-05-12 RX ORDER — EPINEPHRINE 0.3 MG/.3ML
0.3 INJECTION SUBCUTANEOUS ONCE
Qty: 0.3 ML | Refills: 0 | Status: SHIPPED | OUTPATIENT
Start: 2023-05-12 | End: 2023-05-12

## 2023-05-12 RX ORDER — ACETAMINOPHEN 160 MG
2000 TABLET,DISINTEGRATING ORAL DAILY
Refills: 0
Start: 2023-05-12

## 2023-05-12 NOTE — PROGRESS NOTES
Assessment/Plan:       Problem List Items Addressed This Visit        Endocrine    Hyperparathyroidism ([facility] )     PTH was quite elevated  Vitamin D is low  Replace vitamin D and repeat PTH in about 1 month  Consider endocrine referral if he is not improving  Relevant Medications    Cholecalciferol (Vitamin D3) 50 MCG (2000 UT) capsule    Other Relevant Orders    Vitamin D 25 hydroxy    Comprehensive metabolic panel    PTH, intact       Cardiovascular and Mediastinum    Aneurysm of ascending aorta ([facility] )     Management through our CT surgery team   He has a CT chest ordered for the summer  Essential hypertension    Relevant Orders    Echo complete w/ contrast if indicated    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[facility])    Thoracic aortic aneurysm without rupture, unspecified part ([facility] )     Repeat CT chest this summer  Other    Pure hypercholesterolemia    Elevated PSA    Anaphylaxis    Hypercalcemia     Start vitamin D 2000 international units daily and repeat labs in about 1 month  Relevant Orders    Vitamin D 25 hydroxy    Comprehensive metabolic panel    PTH, intact    Elevated brain natriuretic peptide (BNP) level - Primary     He is already on HCTZ and blood pressure is much improved  He does have some signs of fluid overload but we will manage this expectantly with HCTZ  Consider hospitalization if swelling getting any worse  Relevant Orders    Echo complete w/ contrast if indicated    Elevated d-dimer     Check stat venous duplex bilaterally  Initiate anticoagulation if we find DVT  He is still quite swollen in particular in the right calf  Eulene Shailesh' sign is negative  Other Visit Diagnoses     Prostate cancer screening        Relevant Orders    PSA, Total Screen    Bilateral leg edema        Relevant Orders    Echo complete w/ contrast if indicated            Subjective:      Patient ID: cL Campos is a 74 y o  male      HPI patient returns for follow-up for significant hypertension  He has taken HCTZ for 2 nights and his blood pressure is much improved  He still has some lower extremity swelling  D-dimer was positive  BNP was just slightly elevated  He denies orthopnea or significant shortness of breath with exertion  He is having no chest pain  The following portions of the patient's history were reviewed and updated as appropriate: allergies, current medications, past family history, past medical history, past social history, past surgical history and problem list       Current Outpatient Medications:   •  allopurinol (ZYLOPRIM) 100 mg tablet, Take 2 tablets (200 mg total) by mouth daily, Disp: 180 tablet, Rfl: 3  •  Ascorbic Acid (VITAMIN C) 1000 MG tablet, Take 1 tablet by mouth daily, Disp: , Rfl:   •  Cholecalciferol (Vitamin D3) 50 MCG (2000 UT) capsule, Take 1 capsule (2,000 Units total) by mouth daily, Disp: , Rfl: 0  •  EPINEPHrine (EPIPEN) 0 3 mg/0 3 mL SOAJ, Inject 0 3 mL (0 3 mg total) into a muscle once for 1 dose As needed for allergic reaction, Disp: 0 3 mL, Rfl: 0  •  hydrochlorothiazide (HYDRODIURIL) 25 mg tablet, Take 1 tablet (25 mg total) by mouth daily, Disp: 90 tablet, Rfl: 3  •  olmesartan (BENICAR) 20 mg tablet, TAKE 1 TABLET(20 MG) BY MOUTH DAILY, Disp: 90 tablet, Rfl: 3  •  scopolamine (TRANSDERM-SCOP) 1 5 mg/3 days TD 72 hr patch, Place 1 patch on the skin every third day, Disp: 10 patch, Rfl: 1     Review of Systems   Constitutional: Negative for appetite change, chills, fatigue, fever and unexpected weight change  HENT: Negative for trouble swallowing  Eyes: Negative for visual disturbance  Respiratory: Negative for cough, chest tightness, shortness of breath and wheezing  Cardiovascular: Positive for leg swelling  Negative for chest pain and palpitations  Gastrointestinal: Negative for abdominal distention, abdominal pain, blood in stool, constipation and diarrhea  Endocrine: Negative for polyuria  "  Genitourinary: Negative for difficulty urinating and flank pain  Musculoskeletal: Negative for arthralgias and myalgias  Skin: Negative for rash  Neurological: Negative for dizziness and light-headedness  Hematological: Negative for adenopathy  Does not bruise/bleed easily  Psychiatric/Behavioral: Negative for dysphoric mood and sleep disturbance  The patient is not nervous/anxious  Objective:      /82   Pulse 65   Temp 97 5 °F (36 4 °C)   Ht 6' 4\" (1 93 m)   Wt 117 kg (258 lb)   SpO2 93%   BMI 31 40 kg/m²          Physical Exam  Vitals reviewed  Constitutional:       General: He is not in acute distress  Appearance: He is well-developed  He is not diaphoretic  HENT:      Head: Normocephalic  Eyes:      General:         Right eye: No discharge  Left eye: No discharge  Pupils: Pupils are equal, round, and reactive to light  Neck:      Thyroid: No thyromegaly  Trachea: No tracheal deviation  Cardiovascular:      Rate and Rhythm: Normal rate and regular rhythm  Heart sounds: Normal heart sounds  No murmur heard  Pulmonary:      Effort: Pulmonary effort is normal  No respiratory distress  Breath sounds: No wheezing or rales  Abdominal:      General: There is no distension  Palpations: Abdomen is soft  Tenderness: There is no abdominal tenderness  Musculoskeletal:         General: Normal range of motion  Right lower leg: Edema present  Left lower leg: Edema (1+ edema bilaterally at the ankles  ) present  Lymphadenopathy:      Cervical: No cervical adenopathy  Skin:     General: Skin is warm  Findings: No erythema  Neurological:      General: No focal deficit present  Mental Status: He is alert and oriented to person, place, and time  Gait: Gait normal    Psychiatric:         Thought Content:  Thought content normal          Judgment: Judgment normal            Donald Sanchez MD  "

## 2023-05-12 NOTE — ASSESSMENT & PLAN NOTE
Check stat venous duplex bilaterally  Initiate anticoagulation if we find DVT  He is still quite swollen in particular in the right calf  Sepideh Lord' sign is negative

## 2023-05-12 NOTE — ASSESSMENT & PLAN NOTE
PTH was quite elevated  Vitamin D is low  Replace vitamin D and repeat PTH in about 1 month  Consider endocrine referral if he is not improving

## 2023-05-16 NOTE — ASSESSMENT & PLAN NOTE
He is already on HCTZ and blood pressure is much improved  He does have some signs of fluid overload but we will manage this expectantly with HCTZ  Consider hospitalization if swelling getting any worse

## 2023-06-05 ENCOUNTER — HOSPITAL ENCOUNTER (OUTPATIENT)
Dept: CT IMAGING | Facility: HOSPITAL | Age: 73
Discharge: HOME | End: 2023-06-05
Payer: MEDICARE

## 2023-06-05 DIAGNOSIS — I71.21 ANEURYSM OF ASCENDING AORTA (HCC): ICD-10-CM

## 2023-06-05 PROCEDURE — 71250 CT THORAX DX C-: CPT

## 2023-06-05 PROCEDURE — G1004 CDSM NDSC: HCPCS

## 2023-06-09 ENCOUNTER — OFFICE VISIT (OUTPATIENT)
Dept: FAMILY MEDICINE CLINIC | Facility: CLINIC | Age: 73
End: 2023-06-09
Payer: MEDICARE

## 2023-06-09 VITALS
OXYGEN SATURATION: 96 % | BODY MASS INDEX: 31.17 KG/M2 | HEART RATE: 69 BPM | DIASTOLIC BLOOD PRESSURE: 82 MMHG | TEMPERATURE: 97.3 F | SYSTOLIC BLOOD PRESSURE: 152 MMHG | HEIGHT: 76 IN | WEIGHT: 256 LBS

## 2023-06-09 DIAGNOSIS — E21.3 HYPERPARATHYROIDISM (HCC): ICD-10-CM

## 2023-06-09 DIAGNOSIS — I71.20 THORACIC AORTIC ANEURYSM WITHOUT RUPTURE, UNSPECIFIED PART (HCC): ICD-10-CM

## 2023-06-09 DIAGNOSIS — I10 ESSENTIAL HYPERTENSION: Primary | ICD-10-CM

## 2023-06-09 DIAGNOSIS — I50.41 ACUTE COMBINED SYSTOLIC AND DIASTOLIC CONGESTIVE HEART FAILURE (HCC): ICD-10-CM

## 2023-06-09 DIAGNOSIS — I71.21 ANEURYSM OF ASCENDING AORTA WITHOUT RUPTURE (HCC): ICD-10-CM

## 2023-06-09 DIAGNOSIS — R79.89 ELEVATED BRAIN NATRIURETIC PEPTIDE (BNP) LEVEL: ICD-10-CM

## 2023-06-09 DIAGNOSIS — E78.00 PURE HYPERCHOLESTEROLEMIA: ICD-10-CM

## 2023-06-09 PROCEDURE — 99214 OFFICE O/P EST MOD 30 MIN: CPT | Performed by: FAMILY MEDICINE

## 2023-06-09 RX ORDER — OLMESARTAN MEDOXOMIL 40 MG/1
40 TABLET ORAL DAILY
Qty: 90 TABLET | Refills: 3 | Status: SHIPPED | OUTPATIENT
Start: 2023-06-09

## 2023-06-09 RX ORDER — FUROSEMIDE 20 MG/1
TABLET ORAL
Qty: 90 TABLET | Refills: 3 | Status: SHIPPED | OUTPATIENT
Start: 2023-06-09

## 2023-06-09 NOTE — ASSESSMENT & PLAN NOTE
Wt Readings from Last 3 Encounters:   06/09/23 116 kg (256 lb)   05/12/23 117 kg (258 lb)   05/10/23 117 kg (258 lb)     He no longer appears fluid overloaded  He stopped hydrochlorothiazide due to some leg cramping  Blood pressure is poorly controlled and I am going to bump up his olmesartan to 40 mg daily  I will also give him furosemide 20 mg to take daily as needed for noted fluid overload, which presents with significant ankle edema in his case

## 2023-06-09 NOTE — ASSESSMENT & PLAN NOTE
He needs better blood pressure control  Increase olmesartan  Likely will add beta-blocker at next visit

## 2023-06-09 NOTE — ASSESSMENT & PLAN NOTE
Poor control of his blood pressure  Increase olmesartan to 40 mg daily  Consider addition of a beta-blocker in the future which I have held off in the past since he had significant smoking history  Stop hydrochlorothiazide altogether since he was having leg cramps  Initiate furosemide as needed for intermittent swelling

## 2023-06-09 NOTE — PROGRESS NOTES
Assessment/Plan:       Problem List Items Addressed This Visit        Endocrine    Hyperparathyroidism ([facility] )     Continue routine monitoring of calcium and PTH  Cardiovascular and Mediastinum    Aneurysm of ascending aorta ([facility] )     He needs better blood pressure control  Increase olmesartan  Likely will add beta-blocker at next visit  Essential hypertension - Primary     Poor control of his blood pressure  Increase olmesartan to 40 mg daily  Consider addition of a beta-blocker in the future which I have held off in the past since he had significant smoking history  Stop hydrochlorothiazide altogether since he was having leg cramps  Initiate furosemide as needed for intermittent swelling  Relevant Medications    furosemide (LASIX) 20 mg tablet    olmesartan (BENICAR) 40 mg tablet    Thoracic aortic aneurysm without rupture, unspecified part ([facility] )     He will be seeing our CT surgery team in the near future  CT is up-to-date  Acute combined systolic and diastolic congestive heart failure (HCC)     Wt Readings from Last 3 Encounters:   06/09/23 116 kg (256 lb)   05/12/23 117 kg (258 lb)   05/10/23 117 kg (258 lb)     He no longer appears fluid overloaded  He stopped hydrochlorothiazide due to some leg cramping  Blood pressure is poorly controlled and I am going to bump up his olmesartan to 40 mg daily  I will also give him furosemide 20 mg to take daily as needed for noted fluid overload, which presents with significant ankle edema in his case  Relevant Medications    furosemide (LASIX) 20 mg tablet       Other    Pure hypercholesterolemia    Elevated brain natriuretic peptide (BNP) level     At this point, he appears to be clinically stable and no longer in CHF  Subjective:      Patient ID: Gibson Bacon is a 74 y o  male  HPI patient presents today for follow-up for chronic health issues as well as an acute issue of lower extremity swelling  He had some mild shortness of breath  BNP was mildly elevated  He was placed on hydrochlorothiazide and diuresed quite well but he was having significant leg cramps with stopped hydrochlorothiazide about a week ago  At this time, he denies any chest pain  He is having no orthopnea  He remains relatively active and does not have any exertional shortness of breath  He does continue to drink 2-3 drinks per day  The following portions of the patient's history were reviewed and updated as appropriate: allergies, current medications, past family history, past medical history, past social history, past surgical history and problem list       Current Outpatient Medications:   •  allopurinol (ZYLOPRIM) 100 mg tablet, Take 2 tablets (200 mg total) by mouth daily, Disp: 180 tablet, Rfl: 3  •  Ascorbic Acid (VITAMIN C) 1000 MG tablet, Take 1 tablet by mouth daily, Disp: , Rfl:   •  Cholecalciferol (Vitamin D3) 50 MCG (2000 UT) capsule, Take 1 capsule (2,000 Units total) by mouth daily, Disp: , Rfl: 0  •  furosemide (LASIX) 20 mg tablet, Take 1 tablet daily as needed for lower extremity swelling  Take potassium supplement if taking , Disp: 90 tablet, Rfl: 3  •  olmesartan (BENICAR) 40 mg tablet, Take 1 tablet (40 mg total) by mouth daily, Disp: 90 tablet, Rfl: 3  •  scopolamine (TRANSDERM-SCOP) 1 5 mg/3 days TD 72 hr patch, Place 1 patch on the skin every third day, Disp: 10 patch, Rfl: 1  •  EPINEPHrine (EPIPEN) 0 3 mg/0 3 mL SOAJ, Inject 0 3 mL (0 3 mg total) into a muscle once for 1 dose As needed for allergic reaction, Disp: 0 3 mL, Rfl: 0     Review of Systems   Constitutional: Negative for appetite change, chills, fatigue, fever and unexpected weight change  HENT: Negative for trouble swallowing  Eyes: Negative for visual disturbance  Respiratory: Negative for cough, chest tightness, shortness of breath and wheezing  Cardiovascular: Negative for chest pain, palpitations and leg swelling  "  Gastrointestinal: Negative for abdominal distention, abdominal pain, blood in stool, constipation and diarrhea  Endocrine: Negative for polyuria  Genitourinary: Negative for difficulty urinating and flank pain  Musculoskeletal: Negative for arthralgias, back pain and myalgias  Skin: Negative for rash  Neurological: Negative for dizziness and light-headedness  Hematological: Negative for adenopathy  Does not bruise/bleed easily  Psychiatric/Behavioral: Negative for dysphoric mood and sleep disturbance  The patient is not nervous/anxious  Objective:      /82 (BP Location: Left arm, Patient Position: Sitting, Cuff Size: Large)   Pulse 69   Temp (!) 97 3 °F (36 3 °C) (Tympanic)   Ht 6' 4\" (1 93 m)   Wt 116 kg (256 lb)   SpO2 96%   BMI 31 16 kg/m²          Physical Exam  Vitals reviewed  Constitutional:       General: He is not in acute distress  Appearance: He is well-developed  He is not diaphoretic  HENT:      Head: Normocephalic  Eyes:      General:         Right eye: No discharge  Left eye: No discharge  Pupils: Pupils are equal, round, and reactive to light  Neck:      Thyroid: No thyromegaly  Trachea: No tracheal deviation  Cardiovascular:      Rate and Rhythm: Normal rate and regular rhythm  Heart sounds: Normal heart sounds  No murmur heard  Pulmonary:      Effort: Pulmonary effort is normal  No respiratory distress  Breath sounds: No wheezing or rales  Abdominal:      General: There is no distension  Palpations: Abdomen is soft  Tenderness: There is no abdominal tenderness  Musculoskeletal:         General: Normal range of motion  Right lower leg: Edema present  Left lower leg: Edema (Trace pitting edema bilateral lower extremities  ) present  Lymphadenopathy:      Cervical: No cervical adenopathy  Skin:     General: Skin is warm  Findings: No erythema     Neurological:      General: No focal deficit " present  Mental Status: He is alert and oriented to person, place, and time  Gait: Gait normal    Psychiatric:         Thought Content:  Thought content normal          Judgment: Judgment normal            Edison Leach MD

## 2023-06-12 ENCOUNTER — OFFICE VISIT (OUTPATIENT)
Dept: CARDIAC SURGERY | Facility: CLINIC | Age: 73
End: 2023-06-12
Payer: MEDICARE

## 2023-06-12 VITALS
WEIGHT: 255.9 LBS | SYSTOLIC BLOOD PRESSURE: 128 MMHG | HEART RATE: 69 BPM | OXYGEN SATURATION: 96 % | BODY MASS INDEX: 31.16 KG/M2 | TEMPERATURE: 97.4 F | HEIGHT: 76 IN | DIASTOLIC BLOOD PRESSURE: 80 MMHG

## 2023-06-12 DIAGNOSIS — I71.22 ANEURYSM OF AORTIC ARCH WITHOUT RUPTURE (HCC): Primary | ICD-10-CM

## 2023-06-12 PROCEDURE — 99214 OFFICE O/P EST MOD 30 MIN: CPT | Performed by: THORACIC SURGERY (CARDIOTHORACIC VASCULAR SURGERY)

## 2023-06-12 NOTE — LETTER
June 12, 2023     Jose Antonio Feliz MD  [address]  [address] [facility] [address]    Patient: Gibson Bacon   YOB: 1950   Date of Visit: 6/12/2023       Dear Dr Jorge Garza: Thank you for referring Hiwot Burris to me for evaluation  Below are my notes for this consultation  If you have questions, please do not hesitate to call me  I look forward to following your patient along with you  Sincerely,        Ophelia Fowler, DO        CC: No Recipients    MARLEY Macias  6/12/2023 10:46 AM  Attested  Aortic Clinic  Gibson Bacon 74 y o  male MRN: [id number]    Physician Requesting Consult: No att  providers found    Reason for Consult / Principal Problem: Ascending aortic aneurysm    History of Present Illness: Gibson Bacon is a 74y o  year old male who presents to aortic clinic today for ongoing surveillance of an ascending aortic aneurysm  This was initially identified in 2014 and measured 4 4 cm  He has had year chest CT scans since then demonstrating stability  APtient was seen in our aortic clinic in June 2022 for initial consultation and ongoing surveillance recommended  Last Echo perform in 2019 demonstrates a trilleaflet aortic valve with LVOT obstruction due to LVH  Patient's PMHx is notable for HTN, HLD, diastolic HF, melanoma, OA, lumbar radiculopathy, gout and colon polyps  Patient has no FH of aneurysms  Patient returns for a 1 year follow up today with repeat imaging  Upon interview patient reports trying to lose weight by means of the [employer]  He states he had been eating a lot of lunch meats and cheese  He developed LE edema and elevated BP (SBP up to 200) on his home monitor  He was seen by his PCP who added Furosemide, increased his Olmesaran dose and advised to stop the Coca[employer] and eliminate high salt foods  Patient reports improvement in both the edema and his BP with theses adjustments    He denies chest pain, SOB, lightheadedness, new upper back pain, abdominal pain, numbness, tingling or palpitations  Past Medical History:  Past Medical History:   Diagnosis Date   • Abnormal chest CT     RESOLVED: [id number]   • Balance disorder     RESOLVED: [id number]   • Colon polyp    • Lumbar radiculopathy     RESOLVED: [id number]   • Lumbar spondylosis     RESOLVED: [id number]   • Melanoma ([address] )          Past Surgical History:   Past Surgical History:   Procedure Laterality Date   • CHOLECYSTECTOMY     • COLONOSCOPY  02/08/2022   • WA RPR/ADVMNT FLXR TDN N/Z/2 W/O FR GRAFT EA TENDON Right 08/27/2019    Procedure: REPAIR FLEXOR TENDON right small FINGER;  Surgeon: Sherlyn Ring MD;  Location: [facility];  Service: Orthopedics   • TONSILLECTOMY     • TOTAL HIP ARTHROPLASTY Left 06/2016         Family History:  Family History   Problem Relation Age of Onset   • Hypertension Father    • Arthritis Family    • Colon polyps Family          Social History:    Social History     Substance and Sexual Activity   Alcohol Use Yes    Comment: DAILY, liquor     Social History     Substance and Sexual Activity   Drug Use Not Currently     Social History     Tobacco Use   Smoking Status Former   • Packs/day: 0 25   • Types: Cigarettes   • Start date: 10/1/2020   Smokeless Tobacco Never         Home Medications:   Prior to Admission medications    Medication Sig Start Date End Date Taking?  Authorizing Provider   allopurinol (ZYLOPRIM) 100 mg tablet Take 2 tablets (200 mg total) by mouth daily 4/19/22  Yes Jennifer May MD   Ascorbic Acid (VITAMIN C) 1000 MG tablet Take 1 tablet by mouth daily 11/20/17  Yes Historical Provider, MD   Cholecalciferol (Vitamin D3) 50 MCG (2000 UT) capsule Take 1 capsule (2,000 Units total) by mouth daily 5/12/23  Yes Jennifer May MD   EPINEPHrine (EPIPEN) 0 3 mg/0 3 mL SOAJ Inject 0 3 mL (0 3 mg total) into a muscle once for 1 dose As needed for allergic reaction 5/12/23 6/12/23 Yes Joel Amanda To David MD   furosemide (LASIX) 20 mg tablet Take 1 tablet daily as needed for lower extremity swelling  Take potassium supplement if taking  6/9/23  Yes Rohan Kovacs MD   olmesartan (BENICAR) 40 mg tablet Take 1 tablet (40 mg total) by mouth daily 6/9/23  Yes Rohan Kovacs MD   scopolamine (TRANSDERM-SCOP) 1 5 mg/3 days TD 72 hr patch Place 1 patch on the skin every third day  Patient not taking: Reported on 6/12/2023 2/27/20   Rohan Kovacs MD       Allergies:   Allergies   Allergen Reactions   • Atorvastatin Myalgia   • Honey Bee Venom Swelling   • Rosuvastatin Myalgia       Review of Systems:   Review of Systems - History obtained from chart review and the patient  General ROS: positive for  - fatigue  negative for - chills or fever  Psychological ROS: negative  Ophthalmic ROS: positive for - uses glasses  ENT ROS: negative  Allergy and Immunology ROS: negative  Hematological and Lymphatic ROS: negative  Endocrine ROS: negative  Breast ROS: negative  Respiratory ROS: no cough, shortness of breath, or wheezing  Cardiovascular ROS: positive for - edema  negative for - chest pain, dyspnea on exertion, irregular heartbeat, loss of consciousness, murmur, orthopnea, palpitations, paroxysmal nocturnal dyspnea or rapid heart rate  Gastrointestinal ROS: no abdominal pain, change in bowel habits, or black or bloody stools  Genito-Urinary ROS: no dysuria, trouble voiding, or hematuria  Musculoskeletal ROS: positive for - chronic back pain  Neurological ROS: no TIA or stroke symptoms  Dermatological ROS: negative    Vital Signs:   Vitals:    06/12/23 0957 06/12/23 1002   BP: 127/81 128/80   BP Location: Left arm Right arm   Patient Position: Sitting Sitting   Cuff Size: Standard Standard   Pulse: 69    Temp: (!) 97 4 °F (36 3 °C)    TempSrc: Tympanic    SpO2: 96%    Weight: 116 kg (255 lb 14 4 oz)    Height: 6' 4\" (1 93 m)        Physical Exam:  General: Alert, oriented, well developed, no acute " distress  HEENT/NECK:  PERRLA  No jugular venous distention  Cardiac:Regular rate and rhythm, I/VI systolic murmur  Carotid arteries: 2+ pulses, no bruits  Pulmonary:  Breath sounds clear bilaterally  Abdomen:  Non-tender, Non-distended  Positive bowel sounds  Upper extremities: 2+ radial pulses; brisk capillary refill  Lower extremities: Extremities warm/dry  PT/DP pulses 2+ bilaterally  Trace edema B/L  Neuro: Alert and oriented X 3  Sensation is grossly intact  No focal deficits  Musculoskeletal: MAEE, stable gait  Skin: Warm/Dry, without rashes or lesions  Lab Results:     Lab Results   Component Value Date    HCT 45 7 05/11/2023    HGB 15 1 05/11/2023    MCV 94 05/11/2023     05/11/2023    WBC 4 71 05/11/2023     Lab Results   Component Value Date    AGAP 2 (L) 05/11/2023    ALKPHOS 76 05/11/2023    ALT 51 05/11/2023    ANIONGAP 6 12/18/2015    AST 30 05/11/2023    BILITOT 0 37 12/18/2015    BUN 17 05/11/2023    CALCIUM 9 5 05/11/2023     05/11/2023    CO2 27 05/11/2023    CREATININE 1 02 05/11/2023    EGFR 72 05/11/2023    GLUC 104 10/08/2018    GLUF 97 05/11/2023    K 4 6 05/11/2023     12/18/2015    PROT 6 9 12/18/2015    SODIUM 137 05/11/2023    TBILI 0 60 05/11/2023    TP 7 3 05/11/2023     Lab Results   Component Value Date    CHOLESTEROL 210 (H) 05/11/2023    CHOLESTEROL 241 (H) 11/30/2021    CHOLESTEROL 275 (H) 12/23/2020     Lab Results   Component Value Date    HDL 63 05/11/2023    HDL 52 11/30/2021    HDL 60 12/23/2020     Lab Results   Component Value Date    TRIG 156 (H) 05/11/2023    TRIG 225 (H) 11/30/2021    TRIG 288 (H) 12/23/2020         Imaging Studies:     CT Chest w/o contrast: 6/5/23    Stable borderline ascending thoracic aortic aneurysm measuring to 4 5 cm in diameter      Echocardiogram: 12 23 19    AORTIC VALVE: There was mild to moderate left ventricular outflow obstruction at rest (peak velocity of 1 9 M/S in LVOT tract)-This is due to a normally contractile LV in the presence of mild to moderate LVH The valve was trileaflet  Leaflets exhibited mildly increased thickness  DOPPLER: There was no evidence for stenosis  There was trace regurgitation  AORTA: The root exhibited mild dilatation  I have personally reviewed pertinent films in PACS     PCP notes reviewed    Assessment:  Patient Active Problem List    Diagnosis Date Noted   • Acute combined systolic and diastolic congestive heart failure ([facility] [address] ) 06/09/2023   • Elevated brain natriuretic peptide (BNP) level 05/12/2023   • Hyperparathyroidism ([facility] ) 05/12/2023   • Elevated d-dimer 05/12/2023   • Thoracic aortic aneurysm without rupture, unspecified part ([facility] [address] ) 05/12/2023   • 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([facility] ) 05/10/2023   • COVID-19 01/12/2022   • Hypercalcemia 12/13/2021   • Subacromial bursitis of right shoulder joint 05/31/2019   • Anaphylaxis 10/09/2018   • History of tobacco abuse 10/08/2018   • Elevated PSA 10/12/2017   • Colonic polyp 09/20/2017   • History of left hip replacement 09/20/2017   • Alcohol consumption of more than two drinks per day 06/23/2016   • Osteoarthritis of left hip 06/23/2016   • Motion sickness 07/07/2015   • Malignant melanoma of skin ([facility] ) 06/15/2015   • Aneurysm of ascending aorta ([facility] ) 10/02/2014   • Idiopathic chronic gout of left ankle without tophus 09/26/2014   • Essential hypertension 05/19/2014   • Pure hypercholesterolemia 12/10/2012         Impression/Plan:    Ascending aortic aneurysm- 4 5 cm   1) Stable on imaging since 2014   2) Does not meet surgical criteria   3) Continue with yearly surveillance in aortic clinic with non-contrast chest CT scan  4) Continue medical management of HTN and HLD  5) Repeat Echo scheduled for July 2023  CT imaging  findings were confirmed and shared with the patient today  Halley Zamudio was comfortable with our recommendations, and his questions were answered to his satisfaction       Aortic Aneurysm Instructions were provided to the patient as follows:    1  No lifting more than 50 pounds  Regular aerobic exercise permitted and recommended  2  Maintain a controlled blood pressure with a goal of less than 120/80  3  Follow up in Aortic Clinic as recommended with radiology follow up as instructed  4  Report to the ER or call 911 immediately with the following signs / symptoms: sudden onset of back pain, chest pain or shortness of breath  The patient recently had a screening colonoscopy in 6/2/22  Therefore GI referral is not indicated at this time  SIGNATURE: MARLEY Vazquez  DATE: June 12, 2023  TIME: 10:27 AM  Attestation signed by Sony Velarde DO at 6/12/2023 10:49 AM:  I supervised the Advanced Practitioner  ? I performed, in its entirety, the assessment/plan component of the visit  I agree with the Advanced Practitioner's note with the following additions/exceptions:      Mr Anna Amin presents for routine surveillance of his ascending aortic aneurysm  CT was reviewed by me personally  It showed no significant change at 45mm  Based on these findings, I recommend continued smoking cessation, blood pressure control, surveillance and aortic precautions  SBP goal should be 110s-120s with resting heart rate goal 60-70  The patient will return to the office in 1 year for review of surveillance imaging        Sony Velarde DO 06/12/23

## 2023-06-12 NOTE — PROGRESS NOTES
Aortic Clinic  Shania Slaughter 74 y o  male MRN: [id number]    Physician Requesting Consult: No att  providers found    Reason for Consult / Principal Problem: Ascending aortic aneurysm    History of Present Illness: Shania Slaughter is a 74y o  year old male who presents to aortic clinic today for ongoing surveillance of an ascending aortic aneurysm  This was initially identified in 2014 and measured 4 4 cm  He has had year chest CT scans since then demonstrating stability  APtient was seen in our aortic clinic in June 2022 for initial consultation and ongoing surveillance recommended  Last Echo perform in 2019 demonstrates a trilleaflet aortic valve with LVOT obstruction due to LVH  Patient's PMHx is notable for HTN, HLD, diastolic HF, melanoma, OA, lumbar radiculopathy, gout and colon polyps  Patient has no FH of aneurysms  Patient returns for a 1 year follow up today with repeat imaging  Upon interview patient reports trying to lose weight by means of the [employer]  He states he had been eating a lot of lunch meats and cheese  He developed LE edema and elevated BP (SBP up to 200) on his home monitor  He was seen by his PCP who added Furosemide, increased his Olmesaran dose and advised to stop the [employer] and eliminate high salt foods  Patient reports improvement in both the edema and his BP with theses adjustments  He denies chest pain, SOB, lightheadedness, new upper back pain, abdominal pain, numbness, tingling or palpitations           Past Medical History:  Past Medical History:   Diagnosis Date   • Abnormal chest CT     RESOLVED: [id number]   • Balance disorder     RESOLVED: [id number]   • Colon polyp    • Lumbar radiculopathy     RESOLVED: [id number]   • Lumbar spondylosis     RESOLVED: [id number]   • Melanoma [facility])          Past Surgical History:   Past Surgical History:   Procedure Laterality Date   • CHOLECYSTECTOMY     • COLONOSCOPY  02/08/2022   • DE RPR/ADVMNT FLXR TDN N/Z/2 W/O FR GRAFT EA TENDON Right 08/27/2019    Procedure: REPAIR FLEXOR TENDON right small FINGER;  Surgeon: Terrence Naqvi MD;  Location: [facility];  Service: Orthopedics   • TONSILLECTOMY     • TOTAL HIP ARTHROPLASTY Left 06/2016         Family History:  Family History   Problem Relation Age of Onset   • Hypertension Father    • Arthritis Family    • Colon polyps Family          Social History:    Social History     Substance and Sexual Activity   Alcohol Use Yes    Comment: DAILY, liquor     Social History     Substance and Sexual Activity   Drug Use Not Currently     Social History     Tobacco Use   Smoking Status Former   • Packs/day: 0 25   • Types: Cigarettes   • Start date: 10/1/2020   Smokeless Tobacco Never         Home Medications:   Prior to Admission medications    Medication Sig Start Date End Date Taking? Authorizing Provider   allopurinol (ZYLOPRIM) 100 mg tablet Take 2 tablets (200 mg total) by mouth daily 4/19/22  Yes Ricardo Meneses MD   Ascorbic Acid (VITAMIN C) 1000 MG tablet Take 1 tablet by mouth daily 11/20/17  Yes Historical Provider, MD   Cholecalciferol (Vitamin D3) 50 MCG (2000 UT) capsule Take 1 capsule (2,000 Units total) by mouth daily 5/12/23  Yes Ricardo Meneses MD   EPINEPHrine (EPIPEN) 0 3 mg/0 3 mL SOAJ Inject 0 3 mL (0 3 mg total) into a muscle once for 1 dose As needed for allergic reaction 5/12/23 6/12/23 Yes Ricardo Meneses MD   furosemide (LASIX) 20 mg tablet Take 1 tablet daily as needed for lower extremity swelling  Take potassium supplement if taking  6/9/23  Yes Ricardo Meneses MD   olmesartan (BENICAR) 40 mg tablet Take 1 tablet (40 mg total) by mouth daily 6/9/23  Yes Ricardo Meneses MD   scopolamine (TRANSDERM-SCOP) 1 5 mg/3 days TD 72 hr patch Place 1 patch on the skin every third day  Patient not taking: Reported on 6/12/2023 2/27/20   Ricardo Meneses MD       Allergies:   Allergies   Allergen Reactions   • Atorvastatin Myalgia   • Honey Bee Venom "Swelling   • Rosuvastatin Myalgia       Review of Systems:   Review of Systems - History obtained from chart review and the patient  General ROS: positive for  - fatigue  negative for - chills or fever  Psychological ROS: negative  Ophthalmic ROS: positive for - uses glasses  ENT ROS: negative  Allergy and Immunology ROS: negative  Hematological and Lymphatic ROS: negative  Endocrine ROS: negative  Breast ROS: negative  Respiratory ROS: no cough, shortness of breath, or wheezing  Cardiovascular ROS: positive for - edema  negative for - chest pain, dyspnea on exertion, irregular heartbeat, loss of consciousness, murmur, orthopnea, palpitations, paroxysmal nocturnal dyspnea or rapid heart rate  Gastrointestinal ROS: no abdominal pain, change in bowel habits, or black or bloody stools  Genito-Urinary ROS: no dysuria, trouble voiding, or hematuria  Musculoskeletal ROS: positive for - chronic back pain  Neurological ROS: no TIA or stroke symptoms  Dermatological ROS: negative    Vital Signs:   Vitals:    06/12/23 0957 06/12/23 1002   BP: 127/81 128/80   BP Location: Left arm Right arm   Patient Position: Sitting Sitting   Cuff Size: Standard Standard   Pulse: 69    Temp: (!) 97 4 °F (36 3 °C)    TempSrc: Tympanic    SpO2: 96%    Weight: 116 kg (255 lb 14 4 oz)    Height: 6' 4\" (1 93 m)        Physical Exam:  General: Alert, oriented, well developed, no acute distress  HEENT/NECK:  PERRLA  No jugular venous distention  Cardiac:Regular rate and rhythm, I/VI systolic murmur  Carotid arteries: 2+ pulses, no bruits  Pulmonary:  Breath sounds clear bilaterally  Abdomen:  Non-tender, Non-distended  Positive bowel sounds  Upper extremities: 2+ radial pulses; brisk capillary refill  Lower extremities: Extremities warm/dry  PT/DP pulses 2+ bilaterally  Trace edema B/L  Neuro: Alert and oriented X 3  Sensation is grossly intact  No focal deficits    Musculoskeletal: MAEE, stable gait  Skin: Warm/Dry, without rashes or " lesions  Lab Results:     Lab Results   Component Value Date    HCT 45 7 05/11/2023    HGB 15 1 05/11/2023    MCV 94 05/11/2023     05/11/2023    WBC 4 71 05/11/2023     Lab Results   Component Value Date    AGAP 2 (L) 05/11/2023    ALKPHOS 76 05/11/2023    ALT 51 05/11/2023    ANIONGAP 6 12/18/2015    AST 30 05/11/2023    BILITOT 0 37 12/18/2015    BUN 17 05/11/2023    CALCIUM 9 5 05/11/2023     05/11/2023    CO2 27 05/11/2023    CREATININE 1 02 05/11/2023    EGFR 72 05/11/2023    GLUC 104 10/08/2018    GLUF 97 05/11/2023    K 4 6 05/11/2023     12/18/2015    PROT 6 9 12/18/2015    SODIUM 137 05/11/2023    TBILI 0 60 05/11/2023    TP 7 3 05/11/2023     Lab Results   Component Value Date    CHOLESTEROL 210 (H) 05/11/2023    CHOLESTEROL 241 (H) 11/30/2021    CHOLESTEROL 275 (H) 12/23/2020     Lab Results   Component Value Date    HDL 63 05/11/2023    HDL 52 11/30/2021    HDL 60 12/23/2020     Lab Results   Component Value Date    TRIG 156 (H) 05/11/2023    TRIG 225 (H) 11/30/2021    TRIG 288 (H) 12/23/2020         Imaging Studies:     CT Chest w/o contrast: 6/5/23    Stable borderline ascending thoracic aortic aneurysm measuring to 4 5 cm in diameter  Echocardiogram: 12 23 19    AORTIC VALVE: There was mild to moderate left ventricular outflow obstruction at rest (peak velocity of 1 9 M/S in LVOT tract)-This is due to a normally contractile LV in the presence of mild to moderate LVH The valve was trileaflet  Leaflets exhibited mildly increased thickness  DOPPLER: There was no evidence for stenosis  There was trace regurgitation  AORTA: The root exhibited mild dilatation      I have personally reviewed pertinent films in PACS     PCP notes reviewed    Assessment:  Patient Active Problem List    Diagnosis Date Noted   • Acute combined systolic and diastolic congestive heart failure ([facility] [address] ) 06/09/2023   • Elevated brain natriuretic peptide (BNP) level 05/12/2023   • Hyperparathyroidism ([facility] ) 05/12/2023   • Elevated d-dimer 05/12/2023   • Thoracic aortic aneurysm without rupture, unspecified part ([place] [address] ) 05/12/2023   • 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([place] [address] ) 05/10/2023   • COVID-19 01/12/2022   • Hypercalcemia 12/13/2021   • Subacromial bursitis of right shoulder joint 05/31/2019   • Anaphylaxis 10/09/2018   • History of tobacco abuse 10/08/2018   • Elevated PSA 10/12/2017   • Colonic polyp 09/20/2017   • History of left hip replacement 09/20/2017   • Alcohol consumption of more than two drinks per day 06/23/2016   • Osteoarthritis of left hip 06/23/2016   • Motion sickness 07/07/2015   • Malignant melanoma of skin ([place] [address] ) 06/15/2015   • Aneurysm of ascending aorta ([place] [address] ) 10/02/2014   • Idiopathic chronic gout of left ankle without tophus 09/26/2014   • Essential hypertension 05/19/2014   • Pure hypercholesterolemia 12/10/2012         Impression/Plan:    Ascending aortic aneurysm- 4 5 cm   1) Stable on imaging since 2014   2) Does not meet surgical criteria   3) Continue with yearly surveillance in aortic clinic with non-contrast chest CT scan  4) Continue medical management of HTN and HLD  5) Repeat Echo scheduled for July 2023  CT imaging  findings were confirmed and shared with the patient today  Doris Armas was comfortable with our recommendations, and his questions were answered to his satisfaction  Aortic Aneurysm Instructions were provided to the patient as follows:    1  No lifting more than 50 pounds  Regular aerobic exercise permitted and recommended  2  Maintain a controlled blood pressure with a goal of less than 120/80  3  Follow up in Aortic Clinic as recommended with radiology follow up as instructed  4  Report to the ER or call 911 immediately with the following signs / symptoms: sudden onset of back pain, chest pain or shortness of breath  The patient recently had a screening colonoscopy in 6/2/22  Therefore GI referral is not indicated at this time  SIGNATURE: MARLEY Dumont  DATE: June 12, 2023  TIME: 10:27 AM

## 2023-07-13 NOTE — PROGRESS NOTES
Daily Note     Today's date: 2019  Patient name: Alia Jiménez  : 1950  MRN: [id number]  Referring provider: Chandrika Mcclendon  Dx:   Encounter Diagnosis     ICD-10-CM    1  Laceration of flexor tendon of hand, right, subsequent encounter L18 578G                   Subjective: Pt reports he felt coban was a little tight on distal SF  Swelling in fingers makes it really difficult to mvoe them  Objective: See treatment diary below      Assessment: Tolerated treatment well  Patient exhibited good technique with therapeutic exercises and would benefit from continued PT Redressed coban on SF with lighter pressure to help manage edema  Demonstrated proper way to wrap and provided pt with a roll  Pt has mild lag in DIP flexion  Performed well with place-hold but lags with independent flexion; unable to perform blocking since its SF  Continue to work on scar, edema, and distal ROM in SF  Plan: Continue per plan of care        Precautions: DOS 19  DOI 19      Manual  9/3 9/5 9/10 9/13 9/16 9/18       Gentle passive flexion/tenodesis 10 10 10 10 10 12       Gentle place-hold comp flexion 5 5 5 5 5 5        redressing 10 10 5 reviewed HEP 5 5/3 Scar/retromassage 8        Splint adj 5   Splint adj 5 2         30 25 20 25 25 25           Exercise Diary                                                                                                                                                                                                                                           HEP: gentle comp fist; passive flexion in DBS, place-hold                                           Modalities 1

## 2023-07-26 ENCOUNTER — HOSPITAL ENCOUNTER (OUTPATIENT)
Dept: NON INVASIVE DIAGNOSTICS | Facility: HOSPITAL | Age: 73
Discharge: HOME | End: 2023-07-26
Payer: MEDICARE

## 2023-07-26 VITALS
BODY MASS INDEX: 31.05 KG/M2 | DIASTOLIC BLOOD PRESSURE: 80 MMHG | HEIGHT: 76 IN | SYSTOLIC BLOOD PRESSURE: 128 MMHG | WEIGHT: 255 LBS | HEART RATE: 71 BPM

## 2023-07-26 DIAGNOSIS — R79.89 ELEVATED BRAIN NATRIURETIC PEPTIDE (BNP) LEVEL: ICD-10-CM

## 2023-07-26 DIAGNOSIS — R60.0 BILATERAL LEG EDEMA: ICD-10-CM

## 2023-07-26 DIAGNOSIS — I10 ESSENTIAL HYPERTENSION: ICD-10-CM

## 2023-07-26 LAB
AORTIC VALVE MEAN VELOCITY: 15.2 M/S
APICAL FOUR CHAMBER EJECTION FRACTION: 71 %
AV AREA BY CONTINUOUS VTI: 2.1 CM2
AV AREA PEAK VELOCITY: 1.9 CM2
AV LVOT MEAN GRADIENT: 8 MMHG
AV LVOT PEAK GRADIENT: 11 MMHG
AV MEAN GRADIENT: 11 MMHG
AV PEAK GRADIENT: 21 MMHG
AV VALVE AREA: 2.11 CM2
AV VELOCITY RATIO: 0.74
DOP CALC AO PEAK VEL: 2.27 M/S
DOP CALC AO VTI: 42.58 CM
DOP CALC LVOT AREA: 2.54 CM2
DOP CALC LVOT CARDIAC INDEX: 8.34 L/MIN/M2
DOP CALC LVOT CARDIAC OUTPUT: 20.51 L/MIN
DOP CALC LVOT DIAMETER: 1.8 CM
DOP CALC LVOT PEAK VEL VTI: 35.34 CM
DOP CALC LVOT PEAK VEL: 1.68 M/S
DOP CALC LVOT STROKE INDEX: 35.8 ML/M2
DOP CALC LVOT STROKE VOLUME: 89.88
E WAVE DECELERATION TIME: 343 MS
LAAS-AP4: 18.9 CM2
LEFT ATRIUM AREA SYSTOLE SINGLE PLANE A4C: 18.3 CM2
MV E'TISSUE VEL-LAT: 9 CM/S
MV E'TISSUE VEL-SEP: 8 CM/S
MV PEAK A VEL: 0.98 M/S
MV PEAK E VEL: 55 CM/S
MV STENOSIS PRESSURE HALF TIME: 100 MS
MV VALVE AREA P 1/2 METHOD: 2.2
RA PRESSURE ESTIMATED: 5 MMHG
RIGHT ATRIUM AREA SYSTOLE A4C: 17.4 CM2
RIGHT VENTRICLE ID DIMENSION: 3.8 CM
RV PSP: 24 MMHG
SL CV LV EF: 70
TR MAX PG: 19 MMHG
TR PEAK VELOCITY: 2.2 M/S
TRICUSPID ANNULAR PLANE SYSTOLIC EXCURSION: 2.1 CM
TRICUSPID VALVE PEAK REGURGITATION VELOCITY: 2.15 M/S

## 2023-07-26 PROCEDURE — 93306 TTE W/DOPPLER COMPLETE: CPT

## 2023-07-26 PROCEDURE — 93306 TTE W/DOPPLER COMPLETE: CPT | Performed by: STUDENT IN AN ORGANIZED HEALTH CARE EDUCATION/TRAINING PROGRAM

## 2023-09-09 DIAGNOSIS — M1A.09X0 CHRONIC GOUT OF MULTIPLE SITES, UNSPECIFIED CAUSE: ICD-10-CM

## 2023-09-11 RX ORDER — ALLOPURINOL 100 MG/1
200 TABLET ORAL DAILY
Qty: 180 TABLET | Refills: 0 | Status: SHIPPED | OUTPATIENT
Start: 2023-09-11

## 2023-09-29 ENCOUNTER — OFFICE VISIT (OUTPATIENT)
Dept: FAMILY MEDICINE CLINIC | Facility: CLINIC | Age: 73
End: 2023-09-29
Payer: MEDICARE

## 2023-09-29 ENCOUNTER — APPOINTMENT (OUTPATIENT)
Dept: LAB | Facility: MEDICAL CENTER | Age: 73
End: 2023-09-29
Payer: MEDICARE

## 2023-09-29 VITALS
BODY MASS INDEX: 31.98 KG/M2 | WEIGHT: 262.6 LBS | HEIGHT: 76 IN | SYSTOLIC BLOOD PRESSURE: 130 MMHG | RESPIRATION RATE: 18 BRPM | DIASTOLIC BLOOD PRESSURE: 76 MMHG | OXYGEN SATURATION: 97 % | TEMPERATURE: 97.9 F | HEART RATE: 73 BPM

## 2023-09-29 DIAGNOSIS — E21.3 HYPERPARATHYROIDISM (HCC): ICD-10-CM

## 2023-09-29 DIAGNOSIS — I10 ESSENTIAL HYPERTENSION: Primary | ICD-10-CM

## 2023-09-29 DIAGNOSIS — C43.9 MALIGNANT MELANOMA OF SKIN (HCC): ICD-10-CM

## 2023-09-29 DIAGNOSIS — Z12.5 PROSTATE CANCER SCREENING: ICD-10-CM

## 2023-09-29 DIAGNOSIS — E83.52 HYPERCALCEMIA: ICD-10-CM

## 2023-09-29 DIAGNOSIS — K63.5 POLYP OF COLON, UNSPECIFIED PART OF COLON, UNSPECIFIED TYPE: ICD-10-CM

## 2023-09-29 DIAGNOSIS — I71.20 THORACIC AORTIC ANEURYSM WITHOUT RUPTURE, UNSPECIFIED PART (HCC): ICD-10-CM

## 2023-09-29 DIAGNOSIS — M1A.0720 IDIOPATHIC CHRONIC GOUT OF LEFT ANKLE WITHOUT TOPHUS: ICD-10-CM

## 2023-09-29 DIAGNOSIS — E78.00 PURE HYPERCHOLESTEROLEMIA: ICD-10-CM

## 2023-09-29 DIAGNOSIS — R97.20 ELEVATED PSA: ICD-10-CM

## 2023-09-29 DIAGNOSIS — Z23 ENCOUNTER FOR IMMUNIZATION: ICD-10-CM

## 2023-09-29 DIAGNOSIS — I71.21 ANEURYSM OF ASCENDING AORTA WITHOUT RUPTURE (HCC): ICD-10-CM

## 2023-09-29 DIAGNOSIS — I50.41 ACUTE COMBINED SYSTOLIC AND DIASTOLIC CONGESTIVE HEART FAILURE (HCC): ICD-10-CM

## 2023-09-29 DIAGNOSIS — E66.9 OBESITY (BMI 30.0-34.9): ICD-10-CM

## 2023-09-29 DIAGNOSIS — I71.22 ANEURYSM OF AORTIC ARCH WITHOUT RUPTURE (HCC): ICD-10-CM

## 2023-09-29 DIAGNOSIS — Z87.891 HISTORY OF TOBACCO ABUSE: ICD-10-CM

## 2023-09-29 PROBLEM — E66.811 OBESITY (BMI 30.0-34.9): Status: ACTIVE | Noted: 2023-09-29

## 2023-09-29 PROBLEM — R79.89 ELEVATED D-DIMER: Status: RESOLVED | Noted: 2023-05-12 | Resolved: 2023-09-29

## 2023-09-29 PROBLEM — R79.89 ELEVATED BRAIN NATRIURETIC PEPTIDE (BNP) LEVEL: Status: RESOLVED | Noted: 2023-05-12 | Resolved: 2023-09-29

## 2023-09-29 LAB
25(OH)D3 SERPL-MCNC: 36.6 NG/ML (ref 30–100)
ALBUMIN SERPL BCP-MCNC: 4.2 G/DL (ref 3.5–5)
ALP SERPL-CCNC: 63 U/L (ref 34–104)
ALT SERPL W P-5'-P-CCNC: 36 U/L (ref 7–52)
ANION GAP SERPL CALCULATED.3IONS-SCNC: 8 MMOL/L
AST SERPL W P-5'-P-CCNC: 23 U/L (ref 13–39)
BILIRUB SERPL-MCNC: 0.58 MG/DL (ref 0.2–1)
BUN SERPL-MCNC: 20 MG/DL (ref 5–25)
CALCIUM SERPL-MCNC: 10.1 MG/DL (ref 8.4–10.2)
CHLORIDE SERPL-SCNC: 104 MMOL/L (ref 96–108)
CO2 SERPL-SCNC: 28 MMOL/L (ref 21–32)
CREAT SERPL-MCNC: 1.28 MG/DL (ref 0.6–1.3)
GFR SERPL CREATININE-BSD FRML MDRD: 54 ML/MIN/1.73SQ M
GLUCOSE P FAST SERPL-MCNC: 103 MG/DL (ref 65–99)
POTASSIUM SERPL-SCNC: 5.1 MMOL/L (ref 3.5–5.3)
PROT SERPL-MCNC: 7.2 G/DL (ref 6.4–8.4)
PSA SERPL-MCNC: 5.14 NG/ML (ref 0–4)
PTH-INTACT SERPL-MCNC: 103.5 PG/ML (ref 12–88)
SODIUM SERPL-SCNC: 140 MMOL/L (ref 135–147)

## 2023-09-29 PROCEDURE — 99214 OFFICE O/P EST MOD 30 MIN: CPT | Performed by: FAMILY MEDICINE

## 2023-09-29 PROCEDURE — 80053 COMPREHEN METABOLIC PANEL: CPT

## 2023-09-29 PROCEDURE — 82306 VITAMIN D 25 HYDROXY: CPT

## 2023-09-29 PROCEDURE — G0103 PSA SCREENING: HCPCS

## 2023-09-29 PROCEDURE — 90662 IIV NO PRSV INCREASED AG IM: CPT

## 2023-09-29 PROCEDURE — 83970 ASSAY OF PARATHORMONE: CPT

## 2023-09-29 PROCEDURE — 36415 COLL VENOUS BLD VENIPUNCTURE: CPT

## 2023-09-29 PROCEDURE — G0008 ADMIN INFLUENZA VIRUS VAC: HCPCS

## 2023-09-29 RX ORDER — METHYLPREDNISOLONE 4 MG/1
TABLET ORAL
Qty: 21 TABLET | Refills: 0 | Status: SHIPPED | OUTPATIENT
Start: 2023-09-29

## 2023-09-29 NOTE — ASSESSMENT & PLAN NOTE
Wt Readings from Last 3 Encounters:   09/29/23 119 kg (262 lb 9.6 oz)   07/26/23 116 kg (255 lb)   06/12/23 116 kg (255 lb 14.4 oz)     Fortunately, he seems much improved at this time with as needed furosemide. He will continue with routine follow-up. He is due for some blood work.

## 2023-09-29 NOTE — PROGRESS NOTES
Assessment/Plan:       Problem List Items Addressed This Visit        Digestive    Colonic polyp    Relevant Orders    Ambulatory referral to Gastroenterology       Endocrine    Hyperparathyroidism [facility])     Check labs as outlined. Cardiovascular and Mediastinum    Aneurysm of ascending aorta (HCC)     Patient's risk factors are pretty well controlled at this point. He has quit smoking. Blood pressure looks good today. Continue with routine follow-up with our aortic team.         Essential hypertension - Primary     Controlled at this time. Check labs as outlined. Thoracic aortic aneurysm without rupture, unspecified part ([address])     Continue follow-up with our aortic team.         Acute combined systolic and diastolic congestive heart failure ([address])     Wt Readings from Last 3 Encounters:   09/29/23 119 kg (262 lb 9.6 oz)   07/26/23 116 kg (255 lb)   06/12/23 116 kg (255 lb 14.4 oz)     Fortunately, he seems much improved at this time with as needed furosemide. He will continue with routine follow-up. He is due for some blood work. 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 Relevant Medications    methylPREDNISolone 4 MG tablet therapy pack    Malignant melanoma of skin ([address])     He is encouraged to continue follow-up with dermatology. Other    Pure hypercholesterolemia    Elevated PSA    History of tobacco abuse    Hypercalcemia    Obesity (BMI 30.0-34. 9)    Relevant Medications    Semaglutide-Weight Management (WEGOVY) 0.25 MG/0.5ML    Semaglutide-Weight Management (WEGOVY) 0.5 MG/0.5ML (Start on 10/25/2023)    Semaglutide-Weight Management (Jennifer Sprang) 1 MG/0.5ML (Start on 11/22/2023)    Semaglutide-Weight Management (Jennifer Sprang) 1.7 MG/0.75ML (Start on 12/20/2023) Semaglutide-Weight Management (WEGOVY) 2.4 MG/0.75ML (Start on 1/17/2024)   Other Visit Diagnoses     Encounter for immunization        Relevant Orders    influenza vaccine, high-dose, PF 0.7 mL (FLUZONE HIGH-DOSE) (Completed)            Subjective:      Patient ID: Alexx Encinas is a 74 y.o. male. HPI patient presents today for follow-up for chronic health issues. Overall, he notes he feels well. His lower extremity swelling has improved significantly since his visit back in May. He takes furosemide only rarely. He has completed a recent CT with our aortic team which remains stable. He quit smoking 2 years ago at this point. The following portions of the patient's history were reviewed and updated as appropriate: allergies, current medications, past family history, past medical history, past social history, past surgical history and problem list.      Current Outpatient Medications:   •  allopurinol (ZYLOPRIM) 100 mg tablet, Take 2 tablets (200 mg total) by mouth daily, Disp: 180 tablet, Rfl: 0  •  Ascorbic Acid (VITAMIN C) 1000 MG tablet, Take 1 tablet by mouth daily, Disp: , Rfl:   •  Cholecalciferol (Vitamin D3) 50 MCG (2000 UT) capsule, Take 1 capsule (2,000 Units total) by mouth daily, Disp: , Rfl: 0  •  furosemide (LASIX) 20 mg tablet, Take 1 tablet daily as needed for lower extremity swelling.   Take potassium supplement if taking., Disp: 90 tablet, Rfl: 3  •  methylPREDNISolone 4 MG tablet therapy pack, Use as directed on package, Disp: 21 tablet, Rfl: 0  •  olmesartan (BENICAR) 40 mg tablet, Take 1 tablet (40 mg total) by mouth daily, Disp: 90 tablet, Rfl: 3  •  Semaglutide-Weight Management (WEGOVY) 0.25 MG/0.5ML, Inject 0.5 mL (0.25 mg total) under the skin once a week for 28 days, Disp: 2 mL, Rfl: 0  •  [START ON 10/25/2023] Semaglutide-Weight Management (WEGOVY) 0.5 MG/0.5ML, Inject 0.5 mL (0.5 mg total) under the skin once a week for 28 days Do not start before October 25, 2023., Disp: 2 mL, Rfl: 0  •  [START ON 11/22/2023] Semaglutide-Weight Management (WEGOVY) 1 MG/0.5ML, Inject 0.5 mL (1 mg total) under the skin once a week for 28 days Do not start before November 22, 2023., Disp: 2 mL, Rfl: 0  •  [START ON 12/20/2023] Semaglutide-Weight Management (WEGOVY) 1.7 MG/0.75ML, Inject 0.75 mL (1.7 mg total) under the skin once a week for 28 days Do not start before December 20, 2023., Disp: 3 mL, Rfl: 0  •  [START ON 1/17/2024] Semaglutide-Weight Management (WEGOVY) 2.4 MG/0.75ML, Inject 0.75 mL (2.4 mg total) under the skin once a week for 28 days Do not start before January 17, 2024., Disp: 3 mL, Rfl: 0  •  EPINEPHrine (EPIPEN) 0.3 mg/0.3 mL SOAJ, Inject 0.3 mL (0.3 mg total) into a muscle once for 1 dose As needed for allergic reaction, Disp: 0.3 mL, Rfl: 0  •  scopolamine (TRANSDERM-SCOP) 1.5 mg/3 days TD 72 hr patch, Place 1 patch on the skin every third day (Patient not taking: Reported on 6/12/2023), Disp: 10 patch, Rfl: 1     Review of Systems   Constitutional: Negative for appetite change, chills, fatigue, fever and unexpected weight change. HENT: Negative for trouble swallowing. Eyes: Negative for visual disturbance. Respiratory: Negative for cough, chest tightness, shortness of breath and wheezing. Cardiovascular: Negative for chest pain, palpitations and leg swelling. Gastrointestinal: Negative for abdominal distention, abdominal pain, blood in stool, constipation and diarrhea. Endocrine: Negative for polyuria. Genitourinary: Negative for difficulty urinating and flank pain. Musculoskeletal: Negative for arthralgias and myalgias. Skin: Negative for rash. Neurological: Negative for dizziness and light-headedness. Hematological: Negative for adenopathy. Does not bruise/bleed easily. Psychiatric/Behavioral: Negative for dysphoric mood and sleep disturbance. The patient is not nervous/anxious.           Objective:      /76 (BP Location: Left arm, Patient Position: Standing, Cuff Size: Standard)   Pulse 73   Temp 97.9 °F (36.6 °C) (Tympanic)   Resp 18   Ht 6' 4" (1.93 m)   Wt 119 kg (262 lb 9.6 oz)   SpO2 97%   BMI 31.96 kg/m²          Physical Exam  Vitals reviewed. Constitutional:       General: He is not in acute distress. Appearance: He is well-developed. He is not diaphoretic. HENT:      Head: Normocephalic. Eyes:      General:         Right eye: No discharge. Left eye: No discharge. Pupils: Pupils are equal, round, and reactive to light. Neck:      Thyroid: No thyromegaly. Trachea: No tracheal deviation. Cardiovascular:      Rate and Rhythm: Normal rate and regular rhythm. Heart sounds: Normal heart sounds. No murmur heard. Pulmonary:      Effort: Pulmonary effort is normal. No respiratory distress. Breath sounds: No wheezing or rales. Abdominal:      General: There is no distension. Palpations: Abdomen is soft. Tenderness: There is no abdominal tenderness. Musculoskeletal:         General: Normal range of motion. Right lower leg: No edema. Left lower leg: No edema. Lymphadenopathy:      Cervical: No cervical adenopathy. Skin:     General: Skin is warm. Findings: No erythema. Neurological:      General: No focal deficit present. Mental Status: He is alert and oriented to person, place, and time. Gait: Gait normal.   Psychiatric:         Thought Content:  Thought content normal.         Judgment: Judgment normal.           Estella Márquez MD

## 2023-09-29 NOTE — ASSESSMENT & PLAN NOTE
Patient's risk factors are pretty well controlled at this point. He has quit smoking. Blood pressure looks good today.   Continue with routine follow-up with our aortic team.

## 2023-12-27 ENCOUNTER — APPOINTMENT (EMERGENCY)
Dept: NON INVASIVE DIAGNOSTICS | Facility: HOSPITAL | Age: 73
End: 2023-12-27
Payer: MEDICARE

## 2023-12-27 ENCOUNTER — HOSPITAL ENCOUNTER (EMERGENCY)
Facility: HOSPITAL | Age: 73
Discharge: HOME | End: 2023-12-27
Attending: EMERGENCY MEDICINE
Payer: MEDICARE

## 2023-12-27 VITALS
WEIGHT: 236.77 LBS | RESPIRATION RATE: 18 BRPM | SYSTOLIC BLOOD PRESSURE: 154 MMHG | DIASTOLIC BLOOD PRESSURE: 92 MMHG | BODY MASS INDEX: 28.82 KG/M2 | HEART RATE: 90 BPM | OXYGEN SATURATION: 95 % | TEMPERATURE: 97.5 F

## 2023-12-27 DIAGNOSIS — I82.412 ACUTE DEEP VEIN THROMBOSIS (DVT) OF FEMORAL VEIN OF LEFT LOWER EXTREMITY (HCC): Primary | ICD-10-CM

## 2023-12-27 LAB
ANION GAP SERPL CALCULATED.3IONS-SCNC: 6 MMOL/L
BASOPHILS # BLD AUTO: 0.07 THOUSANDS/ÂΜL (ref 0–0.1)
BASOPHILS NFR BLD AUTO: 1 % (ref 0–1)
BUN SERPL-MCNC: 19 MG/DL (ref 5–25)
CALCIUM SERPL-MCNC: 10 MG/DL (ref 8.4–10.2)
CHLORIDE SERPL-SCNC: 104 MMOL/L (ref 96–108)
CO2 SERPL-SCNC: 27 MMOL/L (ref 21–32)
CREAT SERPL-MCNC: 0.97 MG/DL (ref 0.6–1.3)
EOSINOPHIL # BLD AUTO: 0.23 THOUSAND/ÂΜL (ref 0–0.61)
EOSINOPHIL NFR BLD AUTO: 3 % (ref 0–6)
ERYTHROCYTE [DISTWIDTH] IN BLOOD BY AUTOMATED COUNT: 12.3 % (ref 11.6–15.1)
GFR SERPL CREATININE-BSD FRML MDRD: 76 ML/MIN/1.73SQ M
GLUCOSE SERPL-MCNC: 84 MG/DL (ref 65–140)
HCT VFR BLD AUTO: 40.9 % (ref 36.5–49.3)
HGB BLD-MCNC: 13.8 G/DL (ref 12–17)
IMM GRANULOCYTES # BLD AUTO: 0.04 THOUSAND/UL (ref 0–0.2)
IMM GRANULOCYTES NFR BLD AUTO: 1 % (ref 0–2)
LYMPHOCYTES # BLD AUTO: 1 THOUSANDS/ÂΜL (ref 0.6–4.47)
LYMPHOCYTES NFR BLD AUTO: 14 % (ref 14–44)
MCH RBC QN AUTO: 31.7 PG (ref 26.8–34.3)
MCHC RBC AUTO-ENTMCNC: 33.7 G/DL (ref 31.4–37.4)
MCV RBC AUTO: 94 FL (ref 82–98)
MONOCYTES # BLD AUTO: 0.7 THOUSAND/ÂΜL (ref 0.17–1.22)
MONOCYTES NFR BLD AUTO: 10 % (ref 4–12)
NEUTROPHILS # BLD AUTO: 5 THOUSANDS/ÂΜL (ref 1.85–7.62)
NEUTS SEG NFR BLD AUTO: 71 % (ref 43–75)
NRBC BLD AUTO-RTO: 0 /100 WBCS
PLATELET # BLD AUTO: 161 THOUSANDS/UL (ref 149–390)
PMV BLD AUTO: 8.8 FL (ref 8.9–12.7)
POTASSIUM SERPL-SCNC: 4.7 MMOL/L (ref 3.5–5.3)
RBC # BLD AUTO: 4.35 MILLION/UL (ref 3.88–5.62)
SODIUM SERPL-SCNC: 137 MMOL/L (ref 135–147)
WBC # BLD AUTO: 7.04 THOUSAND/UL (ref 4.31–10.16)

## 2023-12-27 PROCEDURE — 99284 EMERGENCY DEPT VISIT MOD MDM: CPT | Performed by: EMERGENCY MEDICINE

## 2023-12-27 PROCEDURE — 85025 COMPLETE CBC W/AUTO DIFF WBC: CPT | Performed by: EMERGENCY MEDICINE

## 2023-12-27 PROCEDURE — 99285 EMERGENCY DEPT VISIT HI MDM: CPT

## 2023-12-27 PROCEDURE — 93971 EXTREMITY STUDY: CPT

## 2023-12-27 PROCEDURE — 93971 EXTREMITY STUDY: CPT | Performed by: SURGERY

## 2023-12-27 PROCEDURE — 36415 COLL VENOUS BLD VENIPUNCTURE: CPT | Performed by: EMERGENCY MEDICINE

## 2023-12-27 PROCEDURE — 80048 BASIC METABOLIC PNL TOTAL CA: CPT | Performed by: EMERGENCY MEDICINE

## 2023-12-27 RX ORDER — RIVAROXABAN 15 MG-20MG
KIT ORAL
Qty: 51 EACH | Refills: 0 | Status: SHIPPED | OUTPATIENT
Start: 2023-12-27

## 2023-12-27 RX ORDER — TRAMADOL HYDROCHLORIDE 50 MG/1
50 TABLET ORAL EVERY 6 HOURS PRN
Qty: 10 TABLET | Refills: 0 | Status: SHIPPED | OUTPATIENT
Start: 2023-12-27 | End: 2024-01-06

## 2023-12-27 NOTE — ED PROVIDER NOTES
History  Chief Complaint   Patient presents with    Leg Pain     LLE pain and swelling. Called pcp who referred pt to ED for r/o dvt. Denies SOB. Dizziness.      73-year-old male with history of hypertension, aortic aneurysm presents to the ED with a 2-day history of left lower extremity swelling below the knee as well as pain in the left calf and color change to the left toes.  No trauma.  No prior DVT.  No PE or DVT risk factors.  No fevers or chills.  No chest pain or shortness of breath.  Patient spoke with his family doctor who advised him to come to the ED to rule out DVT      History provided by:  Patient   used: No    Leg Pain  Location:  Leg  Time since incident:  2 days  Injury: no    Leg location:  L lower leg  Pain details:     Quality:  Pressure    Radiates to:  Does not radiate    Onset quality:  Gradual    Duration:  2 days    Timing:  Constant    Progression:  Unchanged  Chronicity:  New  Prior injury to area:  No  Relieved by:  None tried  Worsened by:  Nothing  Ineffective treatments:  None tried  Associated symptoms: swelling    Associated symptoms: no decreased ROM, no fever, no itching, no muscle weakness, no neck pain, no numbness, no stiffness and no tingling    Risk factors: no obesity and no recent illness        Prior to Admission Medications   Prescriptions Last Dose Informant Patient Reported? Taking?   Ascorbic Acid (VITAMIN C) 1000 MG tablet  Self Yes No   Sig: Take 1 tablet by mouth daily   Cholecalciferol (Vitamin D3) 50 MCG (2000 UT) capsule  Self No No   Sig: Take 1 capsule (2,000 Units total) by mouth daily   EPINEPHrine (EPIPEN) 0.3 mg/0.3 mL SOAJ   No No   Sig: Inject 0.3 mL (0.3 mg total) into a muscle once for 1 dose As needed for allergic reaction   Semaglutide-Weight Management (WEGOVY) 1.7 MG/0.75ML   No No   Sig: Inject 0.75 mL (1.7 mg total) under the skin once a week for 28 days Do not start before December 20, 2023.   Semaglutide-Weight Management  (WEGOVY) 2.4 MG/0.75ML   No No   Sig: Inject 0.75 mL (2.4 mg total) under the skin once a week for 28 days Do not start before January 17, 2024.   allopurinol (ZYLOPRIM) 100 mg tablet   No No   Sig: Take 2 tablets (200 mg total) by mouth daily   furosemide (LASIX) 20 mg tablet   No No   Sig: Take 1 tablet daily as needed for lower extremity swelling.  Take potassium supplement if taking.   methylPREDNISolone 4 MG tablet therapy pack   No No   Sig: Use as directed on package   olmesartan (BENICAR) 40 mg tablet   No No   Sig: Take 1 tablet (40 mg total) by mouth daily   scopolamine (TRANSDERM-SCOP) 1.5 mg/3 days TD 72 hr patch  Self No No   Sig: Place 1 patch on the skin every third day   Patient not taking: Reported on 6/12/2023      Facility-Administered Medications: None       Past Medical History:   Diagnosis Date    Abnormal chest CT     RESOLVED: 15JUN2016    Balance disorder     RESOLVED: 15JUN2016    Colon polyp     Elevated brain natriuretic peptide (BNP) level 5/12/2023    Elevated d-dimer 5/12/2023    Lumbar radiculopathy     RESOLVED: 15JUN2016    Lumbar spondylosis     RESOLVED: 15JUN2016    Melanoma (HCC)        Past Surgical History:   Procedure Laterality Date    CHOLECYSTECTOMY      COLONOSCOPY  02/08/2022    OH RPR/ADVMNT FLXR TDN N/Z/2 W/O FR GRAFT EA TENDON Right 08/27/2019    Procedure: REPAIR FLEXOR TENDON right small FINGER;  Surgeon: Renaldo Bravo MD;  Location: [facility] OR;  Service: Orthopedics    TONSILLECTOMY      TOTAL HIP ARTHROPLASTY Left 06/2016       Family History   Problem Relation Age of Onset    Hypertension Father     Arthritis Family     Colon polyps Family      I have reviewed and agree with the history as documented.    E-Cigarette/Vaping    E-Cigarette Use Never User      E-Cigarette/Vaping Substances    Nicotine No     THC No     CBD No     Flavoring No     Other No     Unknown No      Social History     Tobacco Use    Smoking status: Former     Current packs/day: 0.25      Average packs/day: 0.3 packs/day for 3.2 years (0.8 ttl pk-yrs)     Types: Cigarettes     Start date: 10/1/2020    Smokeless tobacco: Never   Vaping Use    Vaping status: Never Used   Substance Use Topics    Alcohol use: Yes     Comment: DAILY, liquor    Drug use: Not Currently       Review of Systems   Constitutional: Negative.  Negative for chills and fever.   HENT: Negative.     Eyes: Negative.    Respiratory: Negative.     Cardiovascular: Negative.    Gastrointestinal: Negative.    Genitourinary: Negative.    Musculoskeletal:  Negative for neck pain and stiffness.   Skin: Negative.  Negative for itching.   Allergic/Immunologic: Negative.    Neurological: Negative.  Negative for weakness, numbness and headaches.   Hematological: Negative.    Psychiatric/Behavioral: Negative.     All other systems reviewed and are negative.      Physical Exam  Physical Exam  Vitals and nursing note reviewed.   Constitutional:       General: He is awake. He is not in acute distress.     Appearance: Normal appearance. He is well-developed and overweight. He is not ill-appearing, toxic-appearing or diaphoretic.   HENT:      Head: Normocephalic and atraumatic.      Right Ear: External ear normal.      Left Ear: External ear normal.      Nose: Nose normal.      Mouth/Throat:      Mouth: Mucous membranes are moist.   Eyes:      General: No scleral icterus.     Conjunctiva/sclera: Conjunctivae normal.   Neck:      Thyroid: No thyromegaly.      Vascular: No JVD.   Cardiovascular:      Rate and Rhythm: Normal rate and regular rhythm.      Heart sounds: Normal heart sounds. No murmur heard.     No gallop.   Pulmonary:      Effort: Pulmonary effort is normal. No respiratory distress.      Breath sounds: Normal breath sounds. No stridor. No wheezing, rhonchi or rales.   Abdominal:      General: Bowel sounds are normal. There is no distension.      Palpations: Abdomen is soft. There is no mass.      Tenderness: There is no abdominal  tenderness.      Hernia: No hernia is present.   Musculoskeletal:         General: Swelling (Left lower extremity below the knee) and tenderness (Left calf) present. No deformity.      Comments: The left lower extremity is warm with palpable pulses.  There is no erythema to suggest cellulitis   Skin:     General: Skin is warm and dry.      Coloration: Skin is not jaundiced or pale.      Findings: No bruising, erythema, lesion or rash.   Neurological:      General: No focal deficit present.      Mental Status: He is alert and oriented to person, place, and time.      Motor: No weakness.      Deep Tendon Reflexes: Reflexes are normal and symmetric.   Psychiatric:         Mood and Affect: Mood normal.         Behavior: Behavior is cooperative.         Vital Signs  ED Triage Vitals [12/27/23 1257]   Temperature Pulse Respirations Blood Pressure SpO2   97.5 °F (36.4 °C) 90 18 154/92 95 %      Temp src Heart Rate Source Patient Position - Orthostatic VS BP Location FiO2 (%)   -- -- -- -- --      Pain Score       --           Vitals:    12/27/23 1257   BP: 154/92   Pulse: 90         Visual Acuity      ED Medications  Medications - No data to display    Diagnostic Studies  Results Reviewed       Procedure Component Value Units Date/Time    Basic metabolic panel [[id number]] Collected: 12/27/23 1443    Lab Status: Final result Specimen: Blood from Arm, Right Updated: 12/27/23 1508     Sodium 137 mmol/L      Potassium 4.7 mmol/L      Chloride 104 mmol/L      CO2 27 mmol/L      ANION GAP 6 mmol/L      BUN 19 mg/dL      Creatinine 0.97 mg/dL      Glucose 84 mg/dL      Calcium 10.0 mg/dL      eGFR 76 ml/min/1.73sq m     Narrative:      National Kidney Disease Foundation guidelines for Chronic Kidney Disease (CKD):     Stage 1 with normal or high GFR (GFR > 90 mL/min/1.73 square meters)    Stage 2 Mild CKD (GFR = 60-89 mL/min/1.73 square meters)    Stage 3A Moderate CKD (GFR = 45-59 mL/min/1.73 square meters)    Stage 3B Moderate  CKD (GFR = 30-44 mL/min/1.73 square meters)    Stage 4 Severe CKD (GFR = 15-29 mL/min/1.73 square meters)    Stage 5 End Stage CKD (GFR <15 mL/min/1.73 square meters)  Note: GFR calculation is accurate only with a steady state creatinine    CBC and differential [[id number]]  (Abnormal) Collected: 12/27/23 1443    Lab Status: Final result Specimen: Blood from Arm, Right Updated: 12/27/23 1449     WBC 7.04 Thousand/uL      RBC 4.35 Million/uL      Hemoglobin 13.8 g/dL      Hematocrit 40.9 %      MCV 94 fL      MCH 31.7 pg      MCHC 33.7 g/dL      RDW 12.3 %      MPV 8.8 fL      Platelets 161 Thousands/uL      nRBC 0 /100 WBCs      Neutrophils Relative 71 %      Immat GRANS % 1 %      Lymphocytes Relative 14 %      Monocytes Relative 10 %      Eosinophils Relative 3 %      Basophils Relative 1 %      Neutrophils Absolute 5.00 Thousands/µL      Immature Grans Absolute 0.04 Thousand/uL      Lymphocytes Absolute 1.00 Thousands/µL      Monocytes Absolute 0.70 Thousand/µL      Eosinophils Absolute 0.23 Thousand/µL      Basophils Absolute 0.07 Thousands/µL                    VAS lower limb venous duplex study, unilateral/limited    (Results Pending)              Procedures  Procedures         ED Course  ED Course as of 12/27/23 1617   Wed Dec 27, 2023   1559 eGFR: 76   1600 Normal CBC   1601 Venous duplex left lower extremity:CONCLUSION:  RIGHT LOWER LIMB LIMITED:  Evaluation shows no evidence of thrombus in the common femoral vein.  Doppler evaluation shows a normal response to augmentation maneuvers.     LEFT LOWER LIMB:  Acute deep vein thrombosis is noted in the proximal, mid, and distal femoral  vein, popliteal vein, gastrocnemius veins, posterior tibial veins, and peroneal  veins.  No evidence of superficial thrombophlebitis noted.  Doppler evaluation shows a normal response to augmentation maneuvers.  Popliteal, posterior tibial and anterior tibial arterial Doppler waveform's are  triphasic.     1601 Spoke with patient  regarding the results of venous duplex and acute DVT.  Recommend patient does not travel to [place] on plane as he has planned for later this week.  Will start patient on Xarelto and place ambulatory referral to vascular surgery.  Patient was given return precautions                               SBIRT 20yo+      Flowsheet Row Most Recent Value   Initial Alcohol Screen: US AUDIT-C     1. How often do you have a drink containing alcohol? 0 Filed at: 12/27/2023 1448   2. How many drinks containing alcohol do you have on a typical day you are drinking?  0 Filed at: 12/27/2023 1448   3b. FEMALE Any Age, or MALE 65+: How often do you have 4 or more drinks on one occassion? 0 Filed at: 12/27/2023 1448   Audit-C Score 0 Filed at: 12/27/2023 1448   ANTHONY: How many times in the past year have you...    Used an illegal drug or used a prescription medication for non-medical reasons? Never Filed at: 12/27/2023 1448                      Medical Decision Making  73-year-old male presents to the ED with a 2-day history of left lower extremity swelling below the knee as well as pain in the left calf and foot region.  He states he noticed some color change of his toes.  No chest pain or shortness of breath, fevers or chills.  No prior history of PE or DVT and no known risk factors.  On exam he is alert he is in no acute distress.  Lungs are clear.  He does have swelling of the left calf compared to the right as well as some calf and Achilles tenderness.  There is no erythema to suggest infection.  The extremity is warm with good pulses.  Clinical concern for DVT.  Will order venous duplex to rule out DVT    Amount and/or Complexity of Data Reviewed  Labs: ordered. Decision-making details documented in ED Course.    Risk  Prescription drug management.             Disposition  Final diagnoses:   Acute deep vein thrombosis (DVT) of femoral vein of left lower extremity (HCC)     Time reflects when diagnosis was documented in both MDM as  applicable and the Disposition within this note       Time User Action Codes Description Comment    12/27/2023  4:03 PM Greer Coleman Add [I82.412] Acute deep vein thrombosis (DVT) of femoral vein of left lower extremity (HCC)           ED Disposition       ED Disposition   Discharge    Condition   Good    Date/Time   Wed Dec 27, 2023 1602    Comment   Erlin Hawkins discharge to home/self care.                   Follow-up Information       Follow up With Specialties Details Why Contact Info Additional Information    [facility] Schedule an appointment as soon as possible for a visit in 1 week  [address] [place] [id number] [phone] [facility], [address] [place] [address], [id number] [phone]            Patient's Medications   Discharge Prescriptions    RIVAROXABAN (XARELTO STARTER PACK) 15 & 20 MG STARTER PACK    Take 15 mg by mouth twice daily for 21 days, then 20 mg once daily thereafter.       Start Date: 12/27/2023End Date: --       Order Dose: --       Quantity: 51 each    Refills: 0    TRAMADOL (ULTRAM) 50 MG TABLET    Take 1 tablet (50 mg total) by mouth every 6 (six) hours as needed for severe pain or moderate pain for up to 10 days       Start Date: 12/27/2023End Date: 1/6/2024       Order Dose: 50 mg       Quantity: 10 tablet    Refills: 0           PDMP Review       None            ED Provider  Electronically Signed by             Greer Coleman,   12/27/23 2016

## 2023-12-27 NOTE — ED NOTES
Pt updated on care plan, instructed that he is able to get dressed at this time.      Greer Mckeon RN  12/27/23 [id number]

## 2024-01-10 ENCOUNTER — CONSULT (OUTPATIENT)
Dept: VASCULAR SURGERY | Facility: CLINIC | Age: 74
End: 2024-01-10
Payer: MEDICARE

## 2024-01-10 ENCOUNTER — TELEPHONE (OUTPATIENT)
Dept: HEMATOLOGY ONCOLOGY | Facility: CLINIC | Age: 74
End: 2024-01-10

## 2024-01-10 VITALS
DIASTOLIC BLOOD PRESSURE: 66 MMHG | BODY MASS INDEX: 27.52 KG/M2 | SYSTOLIC BLOOD PRESSURE: 104 MMHG | HEART RATE: 70 BPM | HEIGHT: 76 IN | OXYGEN SATURATION: 99 % | WEIGHT: 226 LBS

## 2024-01-10 DIAGNOSIS — R42 DIZZINESS: ICD-10-CM

## 2024-01-10 DIAGNOSIS — I82.4Y2 ACUTE DEEP VEIN THROMBOSIS (DVT) OF PROXIMAL VEIN OF LEFT LOWER EXTREMITY (HCC): Primary | ICD-10-CM

## 2024-01-10 PROCEDURE — 99204 OFFICE O/P NEW MOD 45 MIN: CPT

## 2024-01-10 RX ORDER — AMOXICILLIN 500 MG/1
CAPSULE ORAL
COMMUNITY
Start: 2023-12-07 | End: 2024-01-10

## 2024-01-10 NOTE — ASSESSMENT & PLAN NOTE
Patient presented to the emergency department on 12/27/2023 with reports of left lower extremity swelling and pain x 2 days.  Patient underwent venous duplex which noted acute deep vein thrombosis in the proximal, mid, and distal femoral vein, popliteal vein, gastrocnemius veins, posterior tibial veins, and peroneal veins. Patient denies any previous history of PE or DVT.  He denies any recent trauma or illness that reduced his mobility.  Patient reports history of malignant melanoma approximately 5 years ago with removal of posterior torso lesion. He follows with dermatology every 6 months. Patient does report that he did travel to [place] for 3 weeks from the beginning of November to just before Thanksgiving.  Patient is reporting swelling and purple discoloration to left lower extremity that worsens throughout the day.  He also reports sensation of pressure changes that occurs with standing and progressively worsens the longer he is on his feet.  He also reports intermittent numbness/tingling, however denies any motor/sensory loss.    Plan:  -We discussed the pathophysiology of deep vein thrombosis (DVT), as well as the difference between provoked versus unprovoked DVT.  -At this time it appears that patient DVT is likely unprovoked, however question if patient is intravascularly depleted related to poor oral intake and if this may have contributed to development of DVT.  -We discussed initiating conservative measures including compression stockings, elevating legs, and regular exercise.  -Patient agreeable to compression stockings. Order placed for knee-high compression stockings. Patient instructed to apply in the morning and remove before bed.  -Due to extent of left lower extremity DVT we will obtain CT venogram to evaluate for possible compressive etiology.  Order placed to obtain BMP 3 days prior to venogram and 3 days after.  -Recommend anticoagulation with Xarelto for 6 months.  Prescription sent to  pharmacy.  -I recommended that patient avoid any prolonged travel at this time until he is evaluated by hematology.  -Referral to hematology at this time for further workup regarding unprovoked DVT.  -Patient will follow-up with vascular surgery in 3 months.

## 2024-01-10 NOTE — TELEPHONE ENCOUNTER
Erlin and Suzan ([facility], [place]) calling in response to a referral that was received for patient to establish care with Hematology.     Outreach was made to schedule a consultation.    A consultation was scheduled for patient during this call. Patient is scheduled on 2/27/24 at 1:30pm with Magi Bradford at the [facility]

## 2024-01-10 NOTE — ASSESSMENT & PLAN NOTE
Patient reports that he has had decreased appetite over the last 10 weeks as he has been taking Mounjaro (without a prescription) and has lost approximately 40 pounds.  Patient presents to the office today with reports of dizziness.  Per patient's wife he suffered a syncopal episode this morning without any fall or head strike.  Patient is currently denying any chest pain, shortness of breath, blurry vision, or palpitations.    Patient appears fatigued, however appears to be mentating normally otherwise.  Lengthy discussion with patient and wife regarding my recommendation that patient be evaluated in the emergency department due to syncopal episode.  Patient is refusing at this time.  I explained that patient may be dehydrated, however there are several other causes for syncopal episode that require further workup.  I instructed patient to stop taking Mounjaro immediately which he is agreeable to at this time.  While patient is not agreeable to evaluation in the emergency department at this time, I did instruct him to report to the emergency department should he develop any chest pain, shortness of breath, palpitations, or additional episodes of syncope.

## 2024-01-10 NOTE — PROGRESS NOTES
Assessment/Plan:    Acute deep vein thrombosis (DVT) of proximal vein of left lower extremity (HCC)  Patient presented to the emergency department on 12/27/2023 with reports of left lower extremity swelling and pain x 2 days.  Patient underwent venous duplex which noted acute deep vein thrombosis in the proximal, mid, and distal femoral vein, popliteal vein, gastrocnemius veins, posterior tibial veins, and peroneal veins. Patient denies any previous history of PE or DVT.  He denies any recent trauma or illness that reduced his mobility.  Patient reports history of malignant melanoma approximately 5 years ago with removal of posterior torso lesion. He follows with dermatology every 6 months. Patient does report that he did travel to [place] for 3 weeks from the beginning of November to just before Thanksgiving.  Patient is reporting swelling and purple discoloration to left lower extremity that worsens throughout the day.  He also reports sensation of pressure changes that occurs with standing and progressively worsens the longer he is on his feet.  He also reports intermittent numbness/tingling, however denies any motor/sensory loss.    Plan:  -We discussed the pathophysiology of deep vein thrombosis (DVT), as well as the difference between provoked versus unprovoked DVT.  -At this time it appears that patient DVT is likely unprovoked, however question if patient is intravascularly depleted related to poor oral intake and if this may have contributed to development of DVT.  -We discussed initiating conservative measures including compression stockings, elevating legs, and regular exercise.  -Patient agreeable to compression stockings. Order placed for knee-high compression stockings. Patient instructed to apply in the morning and remove before bed.  -Due to extent of left lower extremity DVT we will obtain CT venogram to evaluate for possible compressive etiology.  Order placed to obtain BMP 3 days prior to venogram  and 3 days after.  -Recommend anticoagulation with Xarelto for 6 months.  Prescription sent to pharmacy.  -I recommended that patient avoid any prolonged travel at this time until he is evaluated by hematology.  -Referral to hematology at this time for further workup regarding unprovoked DVT.  -Patient will follow-up with vascular surgery in 3 months.        Dizziness  Patient reports that he has had decreased appetite over the last 10 weeks as he has been taking Mounjaro (without a prescription) and has lost approximately 40 pounds.  Patient presents to the office today with reports of dizziness.  Per patient's wife he suffered a syncopal episode this morning without any fall or head strike.  Patient is currently denying any chest pain, shortness of breath, blurry vision, or palpitations.    Patient appears fatigued, however appears to be mentating normally otherwise.  Lengthy discussion with patient and wife regarding my recommendation that patient be evaluated in the emergency department due to syncopal episode.  Patient is refusing at this time.  I explained that patient may be dehydrated, however there are several other causes for syncopal episode that require further workup.  I instructed patient to stop taking Mounjaro immediately which he is agreeable to at this time.  While patient is not agreeable to evaluation in the emergency department at this time, I did instruct him to report to the emergency department should he develop any chest pain, shortness of breath, palpitations, or additional episodes of syncope.       Diagnoses and all orders for this visit:    Acute deep vein thrombosis (DVT) of proximal vein of left lower extremity (HCC)  -     Ambulatory Referral to Hematology / Oncology; Future  -     Compression Stocking  -     CT VENOGRAM LOWER EXTREMITIES LEFT w/ contrast; Future  -     Basic metabolic panel; Future  -     Basic metabolic panel; Future  -     rivaroxaban (Xarelto) 20 mg tablet; Take 1  tablet (20 mg total) by mouth daily with breakfast    Dizziness        Subjective:      Patient ID: Erlin Hawkins is a 73 y.o. male.    Patient is a 73-year-old male, former smoker, with PMH HTN, HLD, OA, hyperparathyroidism, CHF, obesity, and ascending thoracic aortic aneurysm.  Patient is presenting to the vascular surgery office for follow-up s/p left lower extremity DVT diagnosed 12/27/2023.     Patient presented to the emergency department on 12/27/2023 with reports of left lower extremity swelling and pain x 2 days.  Patient underwent venous duplex which noted acute deep vein thrombosis in the proximal, mid, and distal femoral vein, popliteal vein, gastrocnemius veins, posterior tibial veins, and peroneal veins. Patient denies any previous history of PE or DVT.  He denies any recent trauma or illness that reduced his mobility.  Patient reports history of malignant melanoma approximately 5 years ago with removal of posterior torso lesion. He follows with dermatology every 6 months. Patient does report that he did travel to [place] for 3 weeks from the beginning of November to just before Thanksgiving.  Patient is reporting swelling and purple discoloration to left lower extremity that worsens throughout the day.  He also reports sensation of pressure changes that occurs with standing and progressively worsens the longer he is on his feet.  He also reports intermittent numbness/tingling, however denies any motor/sensory loss.    Of note patient reports that he has been taking Mounjaro for approximately 10 weeks.  Patient was prescribed Wegovy by his PCP, however was unable to obtain from the pharmacy due to supply issues.  He reports that he had a friend in [place] who received a prescription for Mounjaro and that he purchased this from his friend.  Patient reports that he has had decreased appetite over the last 10 weeks and has lost approximately 40 pounds.  Patient presents to the office today with reports  "of dizziness.  Per patient's wife he suffered a syncopal episode this morning without any fall or head strike.  Patient is currently denying any chest pain, shortness of breath, blurry vision, or palpitations.        The following portions of the patient's history were reviewed and updated as appropriate: allergies, current medications, past family history, past medical history, past social history, past surgical history, and problem list.    Review of Systems   Constitutional:  Positive for fatigue.   HENT: Negative.     Eyes: Negative.  Negative for visual disturbance.   Respiratory: Negative.  Negative for shortness of breath.    Cardiovascular:  Positive for leg swelling. Negative for chest pain and palpitations.   Gastrointestinal: Negative.    Endocrine: Negative.    Genitourinary: Negative.    Musculoskeletal: Negative.         Left leg pain   Skin:  Positive for color change. Negative for pallor and wound.   Allergic/Immunologic: Negative.    Neurological:  Positive for dizziness and numbness.   Hematological: Negative.    Psychiatric/Behavioral: Negative.       Objective:    /66 (BP Location: Left arm, Patient Position: Sitting, Cuff Size: Standard)   Pulse 70   Ht 6' 4\" (1.93 m)   Wt 103 kg (226 lb)   SpO2 99%   BMI 27.51 kg/m²        Physical Exam  Constitutional:       General: He is not in acute distress.     Appearance: Normal appearance.   HENT:      Head: Normocephalic and atraumatic.   Cardiovascular:      Rate and Rhythm: Normal rate and regular rhythm.      Pulses:           Dorsalis pedis pulses are 1+ on the right side and detected w/ Doppler on the left side.        Posterior tibial pulses are 1+ on the right side and 1+ on the left side.      Heart sounds: Murmur heard.   Pulmonary:      Effort: Pulmonary effort is normal. No respiratory distress.      Breath sounds: Normal breath sounds.   Musculoskeletal:         General: No swelling or tenderness.      Right lower leg: No edema. " "     Left lower le+ Edema present.   Skin:     General: Skin is cool and dry.      Capillary Refill: Capillary refill takes less than 2 seconds.      Findings: No lesion, rash or wound.   Neurological:      General: No focal deficit present.      Mental Status: He is alert and oriented to person, place, and time.   Psychiatric:         Mood and Affect: Mood normal.         Behavior: Behavior normal.     I have reviewed and made appropriate changes to the review of systems input by the medical assistant.    Vitals:    01/10/24 1348   BP: 104/66   BP Location: Left arm   Patient Position: Sitting   Cuff Size: Standard   Pulse: 70   SpO2: 99%   Weight: 103 kg (226 lb)   Height: 6' 4\" (1.93 m)       Patient Active Problem List   Diagnosis    Alcohol consumption of more than two drinks per day    Aneurysm of ascending aorta (HCC)    Colonic polyp    Idiopathic chronic gout of left ankle without tophus    Pure hypercholesterolemia    Essential hypertension    Malignant melanoma of skin (HCC)    Motion sickness    Osteoarthritis of left hip    Elevated PSA    History of left hip replacement    History of tobacco abuse    Anaphylaxis    Subacromial bursitis of right shoulder joint    Hypercalcemia    COVID-19    Hyperparathyroidism (HCC)    Thoracic aortic aneurysm without rupture, unspecified part (HCC)    Acute combined systolic and diastolic congestive heart failure (HCC)    Obesity (BMI 30.0-34.9)    Acute deep vein thrombosis (DVT) of proximal vein of left lower extremity (HCC)    Dizziness       Past Surgical History:   Procedure Laterality Date    CHOLECYSTECTOMY      COLONOSCOPY  2022    AR RPR/ADVMNT FLXR TDN N/Z/2 W/O FR GRAFT EA TENDON Right 2019    Procedure: REPAIR FLEXOR TENDON right small FINGER;  Surgeon: Renaldo Bravo MD;  Location: [facility];  Service: Orthopedics    TONSILLECTOMY      TOTAL HIP ARTHROPLASTY Left 2016       Family History   Problem Relation Age of Onset    Hypertension " Father     Arthritis Family     Colon polyps Family        Social History     Socioeconomic History    Marital status: /Civil Union     Spouse name: Not on file    Number of children: Not on file    Years of education: Not on file    Highest education level: Not on file   Occupational History    Not on file   Tobacco Use    Smoking status: Former     Current packs/day: 0.25     Average packs/day: 0.3 packs/day for 3.3 years (0.8 ttl pk-yrs)     Types: Cigarettes     Start date: 10/1/2020    Smokeless tobacco: Never   Vaping Use    Vaping status: Never Used   Substance and Sexual Activity    Alcohol use: Yes     Comment: DAILY, liquor    Drug use: Not Currently    Sexual activity: Not on file   Other Topics Concern    Not on file   Social History Narrative    Not on file     Social Determinants of Health     Financial Resource Strain: Not on file   Food Insecurity: Not on file   Transportation Needs: Not on file   Physical Activity: Not on file   Stress: Not on file   Social Connections: Not on file   Intimate Partner Violence: Not on file   Housing Stability: Not on file       Allergies   Allergen Reactions    Atorvastatin Myalgia    Honey Bee Venom Swelling    Rosuvastatin Myalgia         Current Outpatient Medications:     allopurinol (ZYLOPRIM) 100 mg tablet, Take 2 tablets (200 mg total) by mouth daily, Disp: 180 tablet, Rfl: 0    Ascorbic Acid (VITAMIN C) 1000 MG tablet, Take 1 tablet by mouth daily, Disp: , Rfl:     Cholecalciferol (Vitamin D3) 50 MCG (2000 UT) capsule, Take 1 capsule (2,000 Units total) by mouth daily, Disp: , Rfl: 0    furosemide (LASIX) 20 mg tablet, Take 1 tablet daily as needed for lower extremity swelling.  Take potassium supplement if taking., Disp: 90 tablet, Rfl: 3    olmesartan (BENICAR) 40 mg tablet, Take 1 tablet (40 mg total) by mouth daily, Disp: 90 tablet, Rfl: 3    rivaroxaban (Xarelto Starter Pack) 15 & 20 MG starter pack, Take 15 mg by mouth twice daily for 21 days,  then 20 mg once daily thereafter., Disp: 51 each, Rfl: 0    rivaroxaban (Xarelto) 20 mg tablet, Take 1 tablet (20 mg total) by mouth daily with breakfast, Disp: 30 tablet, Rfl: 4    [START ON 1/17/2024] Semaglutide-Weight Management (WEGOVY) 2.4 MG/0.75ML, Inject 0.75 mL (2.4 mg total) under the skin once a week for 28 days Do not start before January 17, 2024., Disp: 3 mL, Rfl: 0    EPINEPHrine (EPIPEN) 0.3 mg/0.3 mL SOAJ, Inject 0.3 mL (0.3 mg total) into a muscle once for 1 dose As needed for allergic reaction (Patient not taking: Reported on 1/10/2024), Disp: 0.3 mL, Rfl: 0    Semaglutide-Weight Management (WEGOVY) 1.7 MG/0.75ML, Inject 0.75 mL (1.7 mg total) under the skin once a week for 28 days Do not start before December 20, 2023. (Patient not taking: Reported on 1/10/2024), Disp: 3 mL, Rfl: 0    I have spent a total time of 40 minutes on 01/10/24 in caring for this patient including Diagnostic results, Prognosis, Risks and benefits of tx options, Instructions for management, Patient and family education, Importance of tx compliance, Risk factor reductions, Impressions, Counseling / Coordination of care, Documenting in the medical record, Reviewing / ordering tests, medicine, procedures  , and Obtaining or reviewing history  .

## 2024-01-17 ENCOUNTER — TELEPHONE (OUTPATIENT)
Dept: VASCULAR SURGERY | Facility: CLINIC | Age: 74
End: 2024-01-17

## 2024-01-17 DIAGNOSIS — I82.4Y2 ACUTE DEEP VEIN THROMBOSIS (DVT) OF PROXIMAL VEIN OF LEFT LOWER EXTREMITY (HCC): Primary | ICD-10-CM

## 2024-01-17 NOTE — TELEPHONE ENCOUNTER
"Spoke with Lillian Novoa PA-C. Per Lillian, pt is to obtain new compression stockings that cover his toes and heels since the ones he is wearing now do not provide this. She also recommended pt to have \"thigh high\" compression stockings instead of \"knee high\". Order placed and faxed to [facility] at [phone] per pt request. Also confirmed pt finished the starter pack of his Xarelto and is now taking the 20 mg once a day as prescribed (today was first day of taking 20mg once a day). Per Lillian, pt is to wear appropriate compression stockings, continue elevating his legs to help with the swelling, and to f/u in the office. Pt voiced understanding. Call was transferred to the call center to set up appt. Appt scheduled for 1/23/2024.   "

## 2024-01-17 NOTE — TELEPHONE ENCOUNTER
"Received call from pt's wife, Isela, re: pain and swelling present in pt's left ankle/toes. Pt saw MARLEY Amador, on 1/10/2024 which was stated that pt has an \"acute deep vein thrombosis in the proximal, mid, and distal femoral vein, popliteal vein, gastrocnemius veins, posterior tibial veins, and peroneal veins.\" Pt stated last night he starting getting pain and swelling in his ankle/toes that is worse than noted on 1/10/2024. Pt stated he has been wearing his compression stockings all day and takes them off at night which he states helps a bit but ever since last night, the swelling and pain is not going away. Pt states he feels his pain is coming from the swelling. He particularly notes pain in his heel where the compression stocking does not cover. Pt denies any wounds, temperature changes, color changes, numbness / tingling. He stated he has been elevating his leg but it doesn't seem to help. He has been taking Tylenol which helps a bit. Advised pt I would send a message to out triage provider to review and advise.   "

## 2024-01-18 ENCOUNTER — LAB (OUTPATIENT)
Dept: LAB | Facility: MEDICAL CENTER | Age: 74
End: 2024-01-18
Payer: MEDICARE

## 2024-01-18 DIAGNOSIS — I82.4Y2 ACUTE DEEP VEIN THROMBOSIS (DVT) OF PROXIMAL VEIN OF LEFT LOWER EXTREMITY (HCC): ICD-10-CM

## 2024-01-18 LAB
ANION GAP SERPL CALCULATED.3IONS-SCNC: 9 MMOL/L
BUN SERPL-MCNC: 19 MG/DL (ref 5–25)
CALCIUM SERPL-MCNC: 10.4 MG/DL (ref 8.4–10.2)
CHLORIDE SERPL-SCNC: 101 MMOL/L (ref 96–108)
CO2 SERPL-SCNC: 28 MMOL/L (ref 21–32)
CREAT SERPL-MCNC: 1.16 MG/DL (ref 0.6–1.3)
GFR SERPL CREATININE-BSD FRML MDRD: 61 ML/MIN/1.73SQ M
GLUCOSE SERPL-MCNC: 97 MG/DL (ref 65–140)
POTASSIUM SERPL-SCNC: 4.9 MMOL/L (ref 3.5–5.3)
SODIUM SERPL-SCNC: 138 MMOL/L (ref 135–147)

## 2024-01-18 PROCEDURE — 36415 COLL VENOUS BLD VENIPUNCTURE: CPT

## 2024-01-18 PROCEDURE — 80048 BASIC METABOLIC PNL TOTAL CA: CPT

## 2024-01-20 ENCOUNTER — HOSPITAL ENCOUNTER (OUTPATIENT)
Dept: CT IMAGING | Facility: HOSPITAL | Age: 74
Discharge: HOME | End: 2024-01-20
Payer: MEDICARE

## 2024-01-20 DIAGNOSIS — I82.4Y2 ACUTE DEEP VEIN THROMBOSIS (DVT) OF PROXIMAL VEIN OF LEFT LOWER EXTREMITY (HCC): ICD-10-CM

## 2024-01-20 PROCEDURE — 73706 CT ANGIO LWR EXTR W/O&W/DYE: CPT

## 2024-01-20 PROCEDURE — G1004 CDSM NDSC: HCPCS

## 2024-01-20 RX ADMIN — IOHEXOL 100 ML: 350 INJECTION, SOLUTION INTRAVENOUS at 12:43

## 2024-01-23 ENCOUNTER — OFFICE VISIT (OUTPATIENT)
Dept: VASCULAR SURGERY | Facility: CLINIC | Age: 74
End: 2024-01-23
Payer: MEDICARE

## 2024-01-23 VITALS
HEART RATE: 69 BPM | WEIGHT: 225 LBS | OXYGEN SATURATION: 99 % | DIASTOLIC BLOOD PRESSURE: 94 MMHG | SYSTOLIC BLOOD PRESSURE: 138 MMHG | HEIGHT: 76 IN | BODY MASS INDEX: 27.4 KG/M2

## 2024-01-23 DIAGNOSIS — M79.672 PAIN OF LEFT HEEL: ICD-10-CM

## 2024-01-23 DIAGNOSIS — I82.4Y2 ACUTE DEEP VEIN THROMBOSIS (DVT) OF PROXIMAL VEIN OF LEFT LOWER EXTREMITY (HCC): Primary | ICD-10-CM

## 2024-01-23 PROCEDURE — 99213 OFFICE O/P EST LOW 20 MIN: CPT

## 2024-01-23 RX ORDER — GABAPENTIN 100 MG/1
100 CAPSULE ORAL 3 TIMES DAILY
Qty: 90 CAPSULE | Refills: 0 | Status: SHIPPED | OUTPATIENT
Start: 2024-01-23 | End: 2024-02-22

## 2024-01-23 RX ORDER — TRAMADOL HYDROCHLORIDE 50 MG/1
50 TABLET ORAL EVERY 8 HOURS PRN
Qty: 30 TABLET | Refills: 0 | Status: SHIPPED | OUTPATIENT
Start: 2024-01-23 | End: 2024-02-06

## 2024-01-23 NOTE — ASSESSMENT & PLAN NOTE
Left heel is swollen and mildly warm/erythematous on exam.  Order placed to check uric acid level due to patient history of chronic idiopathic gout to left ankle.

## 2024-01-23 NOTE — ASSESSMENT & PLAN NOTE
Patient reports constant left heel pain that has worsened since his last office visit.  He is reporting improvement in left lower extremity swelling, however reports that he had difficulty with ambulation related to pain.  Pain is worse with walking and improves at rest however is still present even with left lower extremity elevation.  Patient was previously only wearing Tubigrip for compression.  He reports that he just received compression stockings in the mail today.  He is denying any numbness/tingling or claudication to left lower extremity.  He does note purple discoloration to left foot when sitting or standing.  Color improves with leg elevation.  Patient reports that he has been taking Tylenol every 3 hours, 2-3 tablets each time. Pain has improved slightly since 1/20/24 and he was able to ambulate without significant discomfort today.     Imaging:  CT Venogram LLE 1/20/24:  Extensive DVT involving the left common femoral vein ,the superficial femoral vein and popliteal vein and below knee veins. No surrounding mass effect seen. Subcentimeter left pelvic lymph nodes. No findings to suggest May-Thurner's.    Plan:  -We reviewed the results of CT venogram at length. Enlarged left pelvic lymph nodes likely reactive related to extensive DVT.  -We discussed that pain to left heel is likely related to ongoing swelling noted to left heel. Will trial gabapentin 100mg TID at this time. Order placed for tramadol 50mg q8h PRN for severe pain. Instructed patient on appropriate use.  -Discussion with patient regarding proper use of acetaminophen for pain. Instructed patient that he may take acetaminophen AS INSTRUCTED ON THE BOTTLE and to not exceed the daily recommended dose.  -Continue Xarelto for a total of 6 months.  Patient will be evaluated by hematology on 2/27/2024.  -Compression stocking applied in the office and patient instructed on proper use.  Apply first thing in the morning and remove before  bed.  -Continue conservative management with leg elevation and regular exercise.  -Follow-up in 3 months.

## 2024-01-23 NOTE — PROGRESS NOTES
Assessment/Plan:    Acute deep vein thrombosis (DVT) of proximal vein of left lower extremity (HCC)  Patient reports constant left heel pain that has worsened since his last office visit.  He is reporting improvement in left lower extremity swelling, however reports that he had difficulty with ambulation related to pain.  Pain is worse with walking and improves at rest however is still present even with left lower extremity elevation.  Patient was previously only wearing Tubigrip for compression.  He reports that he just received compression stockings in the mail today.  He is denying any numbness/tingling or claudication to left lower extremity.  He does note purple discoloration to left foot when sitting or standing.  Color improves with leg elevation.  Patient reports that he has been taking Tylenol every 3 hours, 2-3 tablets each time. Pain has improved slightly since 1/20/24 and he was able to ambulate without significant discomfort today.     Imaging:  CT Venogram LLE 1/20/24:  Extensive DVT involving the left common femoral vein ,the superficial femoral vein and popliteal vein and below knee veins. No surrounding mass effect seen. Subcentimeter left pelvic lymph nodes. No findings to suggest May-Thurner's.    Plan:  -We reviewed the results of CT venogram at length. Enlarged left pelvic lymph nodes likely reactive related to extensive DVT.  -We discussed that pain to left heel is likely related to ongoing swelling noted to left heel. Will trial gabapentin 100mg TID at this time. Order placed for tramadol 50mg q8h PRN for severe pain. Instructed patient on appropriate use.  -Discussion with patient regarding proper use of acetaminophen for pain. Instructed patient that he may take acetaminophen AS INSTRUCTED ON THE BOTTLE and to not exceed the daily recommended dose.  -Continue Xarelto for a total of 6 months.  Patient will be evaluated by hematology on 2/27/2024.  -Compression stocking applied in the office  and patient instructed on proper use.  Apply first thing in the morning and remove before bed.  -Continue conservative management with leg elevation and regular exercise.  -Follow-up in 3 months.    Pain of left heel  Left heel is swollen and mildly warm/erythematous on exam.  Order placed to check uric acid level due to patient history of chronic idiopathic gout to left ankle.       Diagnoses and all orders for this visit:    Acute deep vein thrombosis (DVT) of proximal vein of left lower extremity (HCC)  -     gabapentin (Neurontin) 100 mg capsule; Take 1 capsule (100 mg total) by mouth 3 (three) times a day  -     traMADol (Ultram) 50 mg tablet; Take 1 tablet (50 mg total) by mouth every 8 (eight) hours as needed for severe pain for up to 14 days    Pain of left heel  -     Uric acid; Future          Subjective:      Patient ID: Erlin Hawkins is a 73 y.o. male.      Patient is a 73-year-old male, former smoker, with PMH HTN, HLD, OA, hyperparathyroidism, CHF, obesity, and ascending thoracic aortic aneurysm. Patient is presenting to the vascular surgery office to review results of CT venogram completed 1/20/2024 and for evaluation of increased left heel pain.    Patient reports constant left heel pain that has worsened since his last office visit.  He is reporting improvement in left lower extremity swelling, however reports that he had difficulty with ambulation related to pain.  Pain is worse with walking and improves at rest however is still present even with left lower extremity elevation.  Patient was previously only wearing Tubigrip for compression.  He reports that he just received compression stockings in the mail today.  He is denying any numbness/tingling or claudication to left lower extremity.  He does note purple discoloration to left foot when sitting or standing.  Color improves with leg elevation.  Patient reports that he has been taking Tylenol every 3 hours, 2-3 tablets each time. Pain has  "improved slightly since 1/20/24 and he was able to ambulate without significant discomfort today.        The following portions of the patient's history were reviewed and updated as appropriate: allergies, current medications, past family history, past medical history, past social history, past surgical history, and problem list.    Review of Systems   Constitutional: Negative.  Negative for chills and fever.   HENT: Negative.     Eyes: Negative.    Respiratory: Negative.  Negative for shortness of breath.    Cardiovascular: Negative.  Negative for chest pain and leg swelling.   Gastrointestinal: Negative.    Endocrine: Negative.    Genitourinary: Negative.    Musculoskeletal:         Left leg pain   Skin:  Positive for color change. Negative for pallor and wound.   Allergic/Immunologic: Negative.    Neurological: Negative.  Negative for dizziness.   Hematological: Negative.    Psychiatric/Behavioral: Negative.           Objective:    /94 (BP Location: Left arm, Patient Position: Sitting, Cuff Size: Standard)   Pulse 69   Ht 6' 4\" (1.93 m)   Wt 102 kg (225 lb)   SpO2 99%   BMI 27.39 kg/m²        Physical Exam  Constitutional:       General: He is not in acute distress.     Appearance: Normal appearance.   HENT:      Head: Normocephalic and atraumatic.   Cardiovascular:      Rate and Rhythm: Normal rate and regular rhythm.      Pulses:           Dorsalis pedis pulses are 1+ on the right side and detected w/ Doppler on the left side.        Posterior tibial pulses are 1+ on the right side and 1+ on the left side.   Pulmonary:      Effort: Pulmonary effort is normal. No respiratory distress.   Abdominal:      General: Bowel sounds are normal.      Palpations: Abdomen is soft.   Musculoskeletal:         General: Swelling (left heel) and tenderness (left heel) present.      Right lower leg: No edema.      Left lower leg: No edema.   Skin:     General: Skin is warm and dry.      Capillary Refill: Capillary " "refill takes less than 2 seconds.      Findings: Erythema (left  heel) present. No lesion, rash or wound.   Neurological:      General: No focal deficit present.      Mental Status: He is alert and oriented to person, place, and time.   Psychiatric:         Mood and Affect: Mood normal.         Behavior: Behavior normal.     I have reviewed and made appropriate changes to the review of systems input by the medical assistant.    Vitals:    01/23/24 1258   BP: 138/94   BP Location: Left arm   Patient Position: Sitting   Cuff Size: Standard   Pulse: 69   SpO2: 99%   Weight: 102 kg (225 lb)   Height: 6' 4\" (1.93 m)       Patient Active Problem List   Diagnosis    Alcohol consumption of more than two drinks per day    Aneurysm of ascending aorta (HCC)    Colonic polyp    Idiopathic chronic gout of left ankle without tophus    Pure hypercholesterolemia    Essential hypertension    Malignant melanoma of skin (HCC)    Motion sickness    Osteoarthritis of left hip    Elevated PSA    History of left hip replacement    History of tobacco abuse    Anaphylaxis    Subacromial bursitis of right shoulder joint    Hypercalcemia    COVID-19    Hyperparathyroidism (HCC)    Thoracic aortic aneurysm without rupture, unspecified part (HCC)    Acute combined systolic and diastolic congestive heart failure (HCC)    Obesity (BMI 30.0-34.9)    Acute deep vein thrombosis (DVT) of proximal vein of left lower extremity (HCC)    Dizziness    Pain of left heel       Past Surgical History:   Procedure Laterality Date    CHOLECYSTECTOMY      COLONOSCOPY  02/08/2022    ME RPR/ADVMNT FLXR TDN N/Z/2 W/O FR GRAFT EA TENDON Right 08/27/2019    Procedure: REPAIR FLEXOR TENDON right small FINGER;  Surgeon: Renaldo Bravo MD;  Location: [facility];  Service: Orthopedics    TONSILLECTOMY      TOTAL HIP ARTHROPLASTY Left 06/2016       Family History   Problem Relation Age of Onset    Hypertension Father     Arthritis Family     Colon polyps Family  "       Social History     Socioeconomic History    Marital status: /Civil Union     Spouse name: Not on file    Number of children: Not on file    Years of education: Not on file    Highest education level: Not on file   Occupational History    Not on file   Tobacco Use    Smoking status: Former     Current packs/day: 0.25     Average packs/day: 0.3 packs/day for 3.3 years (0.8 ttl pk-yrs)     Types: Cigarettes     Start date: 10/1/2020    Smokeless tobacco: Never   Vaping Use    Vaping status: Never Used   Substance and Sexual Activity    Alcohol use: Yes     Comment: DAILY, liquor    Drug use: Not Currently    Sexual activity: Not on file   Other Topics Concern    Not on file   Social History Narrative    Not on file     Social Determinants of Health     Financial Resource Strain: Not on file   Food Insecurity: Not on file   Transportation Needs: Not on file   Physical Activity: Not on file   Stress: Not on file   Social Connections: Not on file   Intimate Partner Violence: Not on file   Housing Stability: Not on file       Allergies   Allergen Reactions    Atorvastatin Myalgia    Honey Bee Venom Swelling    Rosuvastatin Myalgia         Current Outpatient Medications:     allopurinol (ZYLOPRIM) 100 mg tablet, Take 2 tablets (200 mg total) by mouth daily, Disp: 180 tablet, Rfl: 0    Ascorbic Acid (VITAMIN C) 1000 MG tablet, Take 1 tablet by mouth daily, Disp: , Rfl:     Cholecalciferol (Vitamin D3) 50 MCG (2000 UT) capsule, Take 1 capsule (2,000 Units total) by mouth daily, Disp: , Rfl: 0    furosemide (LASIX) 20 mg tablet, Take 1 tablet daily as needed for lower extremity swelling.  Take potassium supplement if taking., Disp: 90 tablet, Rfl: 3    gabapentin (Neurontin) 100 mg capsule, Take 1 capsule (100 mg total) by mouth 3 (three) times a day, Disp: 90 capsule, Rfl: 0    olmesartan (BENICAR) 40 mg tablet, Take 1 tablet (40 mg total) by mouth daily, Disp: 90 tablet, Rfl: 3    rivaroxaban (Xarelto Starter  Pack) 15 & 20 MG starter pack, Take 15 mg by mouth twice daily for 21 days, then 20 mg once daily thereafter., Disp: 51 each, Rfl: 0    rivaroxaban (Xarelto) 20 mg tablet, Take 1 tablet (20 mg total) by mouth daily with breakfast, Disp: 30 tablet, Rfl: 4    Semaglutide-Weight Management (WEGOVY) 2.4 MG/0.75ML, Inject 0.75 mL (2.4 mg total) under the skin once a week for 28 days Do not start before January 17, 2024., Disp: 3 mL, Rfl: 0    traMADol (Ultram) 50 mg tablet, Take 1 tablet (50 mg total) by mouth every 8 (eight) hours as needed for severe pain for up to 14 days, Disp: 30 tablet, Rfl: 0    EPINEPHrine (EPIPEN) 0.3 mg/0.3 mL SOAJ, Inject 0.3 mL (0.3 mg total) into a muscle once for 1 dose As needed for allergic reaction (Patient not taking: Reported on 1/10/2024), Disp: 0.3 mL, Rfl: 0    I have spent a total time of 30 minutes on 01/23/24 in caring for this patient including Diagnostic results, Prognosis, Risks and benefits of tx options, Instructions for management, Patient and family education, Importance of tx compliance, Risk factor reductions, Impressions, Counseling / Coordination of care, Documenting in the medical record, Reviewing / ordering tests, medicine, procedures  , and Obtaining or reviewing history  .

## 2024-01-24 ENCOUNTER — PATIENT MESSAGE (OUTPATIENT)
Dept: FAMILY MEDICINE CLINIC | Facility: CLINIC | Age: 74
End: 2024-01-24

## 2024-01-24 ENCOUNTER — LAB (OUTPATIENT)
Dept: LAB | Facility: MEDICAL CENTER | Age: 74
End: 2024-01-24
Payer: MEDICARE

## 2024-01-24 DIAGNOSIS — M79.672 PAIN OF LEFT HEEL: ICD-10-CM

## 2024-01-24 DIAGNOSIS — M1A.0720 IDIOPATHIC CHRONIC GOUT OF LEFT ANKLE WITHOUT TOPHUS: Primary | ICD-10-CM

## 2024-01-24 LAB — URATE SERPL-MCNC: 7.2 MG/DL (ref 3.5–8.5)

## 2024-01-24 PROCEDURE — 84550 ASSAY OF BLOOD/URIC ACID: CPT

## 2024-01-24 PROCEDURE — 36415 COLL VENOUS BLD VENIPUNCTURE: CPT

## 2024-01-26 RX ORDER — METHYLPREDNISOLONE 4 MG/1
TABLET ORAL
Qty: 21 TABLET | Refills: 0 | Status: SHIPPED | OUTPATIENT
Start: 2024-01-26

## 2024-02-09 DIAGNOSIS — M1A.09X0 CHRONIC GOUT OF MULTIPLE SITES, UNSPECIFIED CAUSE: ICD-10-CM

## 2024-02-09 RX ORDER — ALLOPURINOL 100 MG/1
200 TABLET ORAL DAILY
Qty: 180 TABLET | Refills: 1 | Status: SHIPPED | OUTPATIENT
Start: 2024-02-09

## 2024-02-27 ENCOUNTER — OFFICE VISIT (OUTPATIENT)
Dept: HEMATOLOGY ONCOLOGY | Facility: CLINIC | Age: 74
End: 2024-02-27
Payer: MEDICARE

## 2024-02-27 VITALS
TEMPERATURE: 97.2 F | SYSTOLIC BLOOD PRESSURE: 124 MMHG | BODY MASS INDEX: 29.22 KG/M2 | HEIGHT: 76 IN | DIASTOLIC BLOOD PRESSURE: 68 MMHG | OXYGEN SATURATION: 96 % | RESPIRATION RATE: 17 BRPM | WEIGHT: 240 LBS | HEART RATE: 82 BPM

## 2024-02-27 DIAGNOSIS — C43.9 MALIGNANT MELANOMA OF SKIN (HCC): ICD-10-CM

## 2024-02-27 DIAGNOSIS — I50.41 ACUTE COMBINED SYSTOLIC AND DIASTOLIC CONGESTIVE HEART FAILURE (HCC): ICD-10-CM

## 2024-02-27 DIAGNOSIS — I82.4Y2 ACUTE DEEP VEIN THROMBOSIS (DVT) OF PROXIMAL VEIN OF LEFT LOWER EXTREMITY (HCC): ICD-10-CM

## 2024-02-27 DIAGNOSIS — R97.20 ELEVATED PROSTATE SPECIFIC ANTIGEN (PSA): ICD-10-CM

## 2024-02-27 PROCEDURE — 99204 OFFICE O/P NEW MOD 45 MIN: CPT | Performed by: PHYSICIAN ASSISTANT

## 2024-02-27 NOTE — PATIENT INSTRUCTIONS
Complete blood work   Call for a dermatology follow up  Schedule GI follow up -- no colonoscopy until June/July

## 2024-02-27 NOTE — PROGRESS NOTES
Hematology/Oncology Outpatient Follow-up  Erlin Hawkins 73 y.o. male 1950 [id number]    Date:  2/27/2024      Assessment and Plan:  1. Acute deep vein thrombosis (DVT) of proximal vein of left lower extremity (HCC)  73 male presents for consult regarding diagnosis of acute DVT.  This likely was provoked in the setting of significant travel.  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  He then flew home a few weeks after this.  The clot happened after the above events.  We reviewed that though he has been doing the same process for many years, age is a risk factor for developing clots.  We have though also reviewed needing to rule out any other underlying causes.  There is no personal or family history.  Unfortunately Medicare no longer pays for factor V or prothrombin so we will omit this at this time as that this is not likely going to  in this patient.  Other labs to be completed as below.  Patient to follow-up towards the 6-month leigh to determine if he will need to continue on lifelong anticoagulation.    Also needs to repeat colonoscopy as he was recommended to repeat in June 2023 and did not.  He had 16 polyps removed previous year in 2022.  He should schedule this for around 6 months time, July 2024.    - Antithrombin III Activity; Future  - Beta-2 glycoprotein antibodies; Future  - Cardiolipin antibody; Future  - Protein C activity; Future  - Protein S activity; Future  - Lupus anticoagulant; Future  - PSA Total, Diagnostic; Future    3. Malignant melanoma of skin (HCC)  Patient is history of melanoma as well as history of nonmelanoma skin cancers.  He is advised that he needs an updated visit with dermatology for a complete exam.  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  We would favor not holding this at this time.    4. Elevated prostate specific antigen (PSA)  PSA was elevated in September; with now extensive DVT, need to repeat.  If persistently elevated or worsening, urology referral will be needed.    - PSA Total, Diagnostic; Future     HPI:  73-year-old male presents for consultation regarding DVT.    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  He has a home locally as well as in [place] and in [facility].  He owns a large boat that has 4 bedrooms.  Every year in the fall he drives about from [place] to [facility].  This was in the beginning of November.  It took him 4 days.  He would talk each day and get off the boat to eat but these were 10 to 12-hour days being sedentary on the boat.  He has been doing this for the past 24 years.    Once arriving in [place] he stayed there for 3 weeks and then flew home via plane.    He states now thinking back on it he started with a weird numbing sensation on the inside of his left upper thigh.  Then by [place] he had significant pain and swelling of the left lower extremity which prompted his emergency room visit on 12/27/2023.    Ultrasound of the left lower extremity showed an acute DVT in the proximal, mid and distal femoral vein, popliteal vein, gastrocnemius veins, posterior tibial and peroneal veins.    Patient was started on Xarelto.    Patient has had issues with swelling and pain of the left lower extremity for which she is seeing vascular surgery and has compression socks for which she is using.    Smoking history:  Stopped cigarettes 4 years ago   Continues to smoke cigar 5 days a week   Pipe - few times a month     Alcohol:  Vodka - daily, 3 shots x 2012   Rare beer/wine     Colonoscopy:  June 2022; 16 polyps removed; was asked to repeat in June 2023 Nov 2023 - was started on Monjauro; lost 40 lbs in 11 weeks, suppressed appetite too much     Oct 2023 - full body exam      Mohs surgery in June 2024 - squamous cell face     ROS: Review of Systems   Constitutional:  Negative for appetite change, chills, fatigue and fever.   HENT:  Negative for mouth sores and nosebleeds.    Respiratory:  Negative for cough and shortness of breath.    Cardiovascular:  Positive for leg swelling (with pain still in left foot; pain in leg is better; taking tylenol daily). Negative for chest pain and palpitations.   Gastrointestinal:  Negative for abdominal pain, constipation, diarrhea, nausea and vomiting.   Genitourinary:  Negative for difficulty urinating, dysuria and hematuria.   Musculoskeletal:  Negative for arthralgias.   Skin: Negative.    Neurological:  Negative for dizziness, weakness, light-headedness, numbness and headaches.   Hematological: Negative.    Psychiatric/Behavioral: Negative.         Past Medical History:   Diagnosis Date    Abnormal chest CT     RESOLVED: 15JUN2016    Balance disorder     RESOLVED: 15JUN2016    Colon polyp     Elevated brain natriuretic peptide (BNP) level 5/12/2023    Elevated d-dimer 5/12/2023    Lumbar radiculopathy     RESOLVED: 15JUN2016    Lumbar spondylosis     RESOLVED: 15JUN2016    Melanoma (HCC)        Past Surgical History:   Procedure Laterality Date    CHOLECYSTECTOMY      COLONOSCOPY  02/08/2022    AK RPR/ADVMNT FLXR TDN N/Z/2 W/O FR GRAFT EA TENDON Right 08/27/2019    Procedure: REPAIR FLEXOR TENDON right small FINGER;  Surgeon: Renaldo Bravo MD;  Location: [facility];  Service: Orthopedics    TONSILLECTOMY      TOTAL HIP ARTHROPLASTY Left 06/2016       Social History     Socioeconomic History    Marital status: /Civil Union     Spouse name: Not on file    Number of children: Not on file    Years of education: Not on file    Highest education level: Not on file   Occupational History    Not on file   Tobacco Use    Smoking status: Former     Current packs/day: 0.25     Average packs/day: 0.3 packs/day for 3.4 years (0.9 ttl pk-yrs)      Types: Cigarettes     Start date: 10/1/2020    Smokeless tobacco: Never   Vaping Use    Vaping status: Never Used   Substance and Sexual Activity    Alcohol use: Yes     Comment: DAILY, liquor    Drug use: Not Currently    Sexual activity: Not on file   Other Topics Concern    Not on file   Social History Narrative    Not on file     Social Determinants of Health     Financial Resource Strain: Not on file   Food Insecurity: Not on file   Transportation Needs: Not on file   Physical Activity: Not on file   Stress: Not on file   Social Connections: Not on file   Intimate Partner Violence: Not on file   Housing Stability: Not on file       Family History   Problem Relation Age of Onset    Hypertension Father     Arthritis Family     Colon polyps Family        Allergies   Allergen Reactions    Atorvastatin Myalgia    Honey Bee Venom Swelling    Rosuvastatin Myalgia         Current Outpatient Medications:     allopurinol (ZYLOPRIM) 100 mg tablet, Take 2 tablets (200 mg total) by mouth daily, Disp: 180 tablet, Rfl: 1    Ascorbic Acid (VITAMIN C) 1000 MG tablet, Take 1 tablet by mouth daily, Disp: , Rfl:     Cholecalciferol (Vitamin D3) 50 MCG (2000 UT) capsule, Take 1 capsule (2,000 Units total) by mouth daily, Disp: , Rfl: 0    furosemide (LASIX) 20 mg tablet, Take 1 tablet daily as needed for lower extremity swelling.  Take potassium supplement if taking., Disp: 90 tablet, Rfl: 3    methylPREDNISolone 4 MG tablet therapy pack, Use as directed on package, Disp: 21 tablet, Rfl: 0    olmesartan (BENICAR) 40 mg tablet, Take 1 tablet (40 mg total) by mouth daily, Disp: 90 tablet, Rfl: 3    rivaroxaban (Xarelto Starter Pack) 15 & 20 MG starter pack, Take 15 mg by mouth twice daily for 21 days, then 20 mg once daily thereafter., Disp: 51 each, Rfl: 0    rivaroxaban (Xarelto) 20 mg tablet, Take 1 tablet (20 mg total) by mouth daily with breakfast, Disp: 30 tablet, Rfl: 4    EPINEPHrine (EPIPEN) 0.3 mg/0.3 mL SOAJ, Inject 0.3 mL  "(0.3 mg total) into a muscle once for 1 dose As needed for allergic reaction (Patient not taking: Reported on 1/10/2024), Disp: 0.3 mL, Rfl: 0    gabapentin (Neurontin) 100 mg capsule, Take 1 capsule (100 mg total) by mouth 3 (three) times a day, Disp: 90 capsule, Rfl: 0    Physical Exam:  /68 (BP Location: Right arm, Patient Position: Sitting, Cuff Size: Adult)   Pulse 82   Temp (!) 97.2 °F (36.2 °C)   Resp 17   Ht 6' 4\" (1.93 m)   Wt 109 kg (240 lb)   SpO2 96%   BMI 29.21 kg/m²     Physical Exam  Vitals reviewed.   Constitutional:       General: He is not in acute distress.     Appearance: He is well-developed. He is not ill-appearing.   HENT:      Head: Normocephalic and atraumatic.   Eyes:      General: No scleral icterus.     Conjunctiva/sclera: Conjunctivae normal.   Cardiovascular:      Rate and Rhythm: Normal rate and regular rhythm.      Heart sounds: Normal heart sounds. No murmur heard.  Pulmonary:      Effort: Pulmonary effort is normal. No respiratory distress.      Breath sounds: Normal breath sounds.   Abdominal:      Palpations: Abdomen is soft.      Tenderness: There is no abdominal tenderness.   Musculoskeletal:         General: No tenderness. Normal range of motion.      Cervical back: Normal range of motion and neck supple.      Right lower leg: No edema.      Left lower leg: No edema.   Lymphadenopathy:      Cervical: No cervical adenopathy.   Skin:     General: Skin is warm and dry.   Neurological:      Mental Status: He is alert and oriented to person, place, and time.      Cranial Nerves: No cranial nerve deficit.   Psychiatric:         Mood and Affect: Mood normal.         Behavior: Behavior normal.       Labs:  Lab Results   Component Value Date    WBC 7.04 12/27/2023    HGB 13.8 12/27/2023    HCT 40.9 12/27/2023    MCV 94 12/27/2023     12/27/2023     I have spent 60 minutes with Patient and family today in which greater than 50% of this time was spent in " counseling/coordination of care regarding Diagnostic results, Risks and benefits of tx options, Instructions for management, Patient and family education, Importance of tx compliance, Impressions, Counseling / Coordination of care, Documenting in the medical record, Reviewing / ordering tests, medicine, procedures  , and Obtaining or reviewing history  .     Patient voiced understanding and agreement in the above discussion. Aware to contact our office with questions/symptoms in the interim.     This note has been generated by voice recognition software system.  Therefore, there may be spelling, grammar, and or syntax errors. Please contact if questions arise.

## 2024-02-28 ENCOUNTER — APPOINTMENT (OUTPATIENT)
Dept: LAB | Facility: MEDICAL CENTER | Age: 74
End: 2024-02-28
Payer: MEDICARE

## 2024-02-28 DIAGNOSIS — R97.20 ELEVATED PROSTATE SPECIFIC ANTIGEN (PSA): ICD-10-CM

## 2024-02-28 DIAGNOSIS — I82.4Y2 ACUTE DEEP VEIN THROMBOSIS (DVT) OF PROXIMAL VEIN OF LEFT LOWER EXTREMITY (HCC): ICD-10-CM

## 2024-02-28 LAB — PSA SERPL-MCNC: 8.26 NG/ML (ref 0–4)

## 2024-02-28 PROCEDURE — 36415 COLL VENOUS BLD VENIPUNCTURE: CPT

## 2024-02-28 PROCEDURE — 85732 THROMBOPLASTIN TIME PARTIAL: CPT

## 2024-02-28 PROCEDURE — 85598 HEXAGNAL PHOSPH PLTLT NEUTRL: CPT

## 2024-02-28 PROCEDURE — 84153 ASSAY OF PSA TOTAL: CPT

## 2024-02-28 PROCEDURE — 85303 CLOT INHIBIT PROT C ACTIVITY: CPT

## 2024-02-28 PROCEDURE — 85300 ANTITHROMBIN III ACTIVITY: CPT

## 2024-02-28 PROCEDURE — 85613 RUSSELL VIPER VENOM DILUTED: CPT

## 2024-02-28 PROCEDURE — 85670 THROMBIN TIME PLASMA: CPT

## 2024-02-28 PROCEDURE — 85306 CLOT INHIBIT PROT S FREE: CPT

## 2024-02-28 PROCEDURE — 85705 THROMBOPLASTIN INHIBITION: CPT

## 2024-02-28 PROCEDURE — 86146 BETA-2 GLYCOPROTEIN ANTIBODY: CPT

## 2024-02-28 PROCEDURE — 86147 CARDIOLIPIN ANTIBODY EA IG: CPT

## 2024-02-29 ENCOUNTER — TELEPHONE (OUTPATIENT)
Dept: HEMATOLOGY ONCOLOGY | Facility: CLINIC | Age: 74
End: 2024-02-29

## 2024-02-29 DIAGNOSIS — R97.20 ELEVATED PROSTATE SPECIFIC ANTIGEN (PSA): Primary | ICD-10-CM

## 2024-02-29 LAB
DEPRECATED AT III PPP: 107 % OF NORMAL (ref 92–136)
PROT C AG ACT/NOR PPP IA: >150 % OF NORMAL (ref 60–150)
PROT S ACT/NOR PPP: 149 % (ref 71–117)

## 2024-02-29 NOTE — TELEPHONE ENCOUNTER
Spoke with patient.  Reviewed PSA is elevated.  Due to his extensive DVT and elevated PSA, [place] in that increased within 6 months, there is concern for possible prostate cancer.  Urology referral is requested.  I reviewed that likely he would need to proceed with imaging/biopsy with them.  Should blood thinner need to be held for any bx, urology will contact me directly to discuss. Patient appreciative of the call. Referral placed and patient given phone number to call and make an appt.

## 2024-03-01 LAB
B2 GLYCOPROT1 IGA SERPL IA-ACNC: 1.5
B2 GLYCOPROT1 IGG SERPL IA-ACNC: <0.8
B2 GLYCOPROT1 IGM SERPL IA-ACNC: 3.2

## 2024-03-02 LAB
APTT HEX PL PPP: 7 SEC (ref 0–11)
APTT SCREEN TO CONFIRM RATIO: 0.79 RATIO (ref 0–1.34)
APTT-LA IMM 4:1 NP PPP: 50.1 SEC (ref 0–40.5)
CONFIRM APTT/NORMAL: 59.9 SEC (ref 0–47.6)
DRVVT IMM 1:2 NP PPP: 89 SEC (ref 0–40.4)
DRVVT SCREEN TO CONFIRM RATIO: 2.1 RATIO (ref 0.8–1.2)
LA PPP-IMP: ABNORMAL
SCREEN APTT: 52.1 SEC (ref 0–43.5)
SCREEN DRVVT: 151 SEC (ref 0–47)
THROMBIN TIME: 16.3 SEC (ref 0–23)

## 2024-03-04 NOTE — PROGRESS NOTES
ASSESSMENT:     73 y.o. male  with significant DVT and elevated PSA    PLAN:     Fortunately, the patient does not have a significant abnormality palpated on prostate exam today.  I am concerned about his DVT and elevated PSA.  Patient would not be inclined to office-based prostate biopsy as he almost was not comfortable letting me do a prostate exam today.  We agree that multiparametric MRI testing can be performed to evaluate the prostate for PI-RADS lesions and need for further invasive biopsy or testing.  When the MRI has been completed, we will contact patient to discuss neck steps.        ----          UROLOGY NEW CONSULT NOTE     CHIEF COMPLAINT   Erlin Hawkins is a 73 y.o. male with a complaint of No chief complaint on file.      History of Present Illness:   Erlin Hawkins is a 73 y.o. male here for evaluation of elevated PSA.  Of note, the patient has what sounds like an extensive deep vein thrombosis.  Was referred to urology given elevated PSA and new clot.  Patient denies a family history of prostate cancer.  It has been many years since patient underwent rectal exam.  No urinary complaints    Lab Results   Component Value Date    PSA 8.26 (H) 02/28/2024    PSA 5.14 (H) 09/29/2023    PSA 3.5 12/15/2021     Past Medical History:     Past Medical History:   Diagnosis Date    Abnormal chest CT     RESOLVED: 15JUN2016    Balance disorder     RESOLVED: 15JUN2016    Colon polyp     Elevated brain natriuretic peptide (BNP) level 5/12/2023    Elevated d-dimer 5/12/2023    Lumbar radiculopathy     RESOLVED: 15JUN2016    Lumbar spondylosis     RESOLVED: 15JUN2016    Melanoma (HCC)        PAST SURGICAL HISTORY:     Past Surgical History:   Procedure Laterality Date    CHOLECYSTECTOMY      COLONOSCOPY  02/08/2022    CO RPR/ADVMNT FLXR TDN N/Z/2 W/O FR GRAFT EA TENDON Right 08/27/2019    Procedure: REPAIR FLEXOR TENDON right small FINGER;  Surgeon: Reanldo Bravo MD;  Location: [facility];  Service: Orthopedics     SKIN CANCER EXCISION      TONSILLECTOMY      TOTAL HIP ARTHROPLASTY Left 06/2016       CURRENT MEDICATIONS:     Current Outpatient Medications   Medication Sig Dispense Refill    allopurinol (ZYLOPRIM) 100 mg tablet Take 2 tablets (200 mg total) by mouth daily 180 tablet 1    Ascorbic Acid (VITAMIN C) 1000 MG tablet Take 1 tablet by mouth daily      Cholecalciferol (Vitamin D3) 50 MCG (2000 UT) capsule Take 1 capsule (2,000 Units total) by mouth daily  0    furosemide (LASIX) 20 mg tablet Take 1 tablet daily as needed for lower extremity swelling.  Take potassium supplement if taking. 90 tablet 3    gabapentin (Neurontin) 100 mg capsule Take 1 capsule (100 mg total) by mouth 3 (three) times a day 90 capsule 0    methylPREDNISolone 4 MG tablet therapy pack Use as directed on package (Patient taking differently: as needed Use as directed on package) 21 tablet 0    olmesartan (BENICAR) 40 mg tablet Take 1 tablet (40 mg total) by mouth daily 90 tablet 3    rivaroxaban (Xarelto) 20 mg tablet Take 1 tablet (20 mg total) by mouth daily with breakfast 30 tablet 4    EPINEPHrine (EPIPEN) 0.3 mg/0.3 mL SOAJ Inject 0.3 mL (0.3 mg total) into a muscle once for 1 dose As needed for allergic reaction (Patient not taking: Reported on 1/10/2024) 0.3 mL 0    rivaroxaban (Xarelto Starter Pack) 15 & 20 MG starter pack Take 15 mg by mouth twice daily for 21 days, then 20 mg once daily thereafter. (Patient not taking: Reported on 3/5/2024) 51 each 0     No current facility-administered medications for this visit.       ALLERGIES:     Allergies   Allergen Reactions    Atorvastatin Myalgia    Honey Bee Venom Swelling    Rosuvastatin Myalgia       SOCIAL HISTORY:     Social History     Socioeconomic History    Marital status: /Civil Union     Spouse name: None    Number of children: None    Years of education: None    Highest education level: None   Occupational History    None   Tobacco Use    Smoking status: Former     Current  "packs/day: 0.25     Average packs/day: 0.3 packs/day for 3.4 years (0.9 ttl pk-yrs)     Types: Cigarettes     Start date: 10/1/2020    Smokeless tobacco: Never   Vaping Use    Vaping status: Never Used   Substance and Sexual Activity    Alcohol use: Yes     Comment: DAILY, liquor    Drug use: Not Currently    Sexual activity: None   Other Topics Concern    None   Social History Narrative    None     Social Determinants of Health     Financial Resource Strain: Not on file   Food Insecurity: Not on file   Transportation Needs: Not on file   Physical Activity: Not on file   Stress: Not on file   Social Connections: Not on file   Intimate Partner Violence: Not on file   Housing Stability: Not on file       SOCIAL HISTORY:     Family History   Problem Relation Age of Onset    Cancer Father     Arthritis Family     Colon polyps Family        REVIEW OF SYSTEMS:     Review of Systems   Constitutional:  Negative for chills and fever.   HENT:  Negative for ear pain and sore throat.    Eyes:  Negative for pain and visual disturbance.   Respiratory:  Negative for cough and shortness of breath.    Cardiovascular:  Negative for chest pain and palpitations.   Gastrointestinal:  Negative for abdominal pain and vomiting.   Genitourinary:  Negative for dysuria and hematuria.   Musculoskeletal:  Negative for arthralgias and back pain.   Skin:  Negative for color change and rash.   Neurological:  Negative for seizures and syncope.   All other systems reviewed and are negative.        PHYSICAL EXAM:     /80   Pulse 67   Ht 6' 4\" (1.93 m)   Wt 110 kg (243 lb)   SpO2 94%   BMI 29.58 kg/m²     Physical Exam  Vitals reviewed.   Constitutional:       General: He is not in acute distress.     Appearance: He is well-developed.   HENT:      Head: Normocephalic and atraumatic.   Eyes:      Pupils: Pupils are equal, round, and reactive to light.   Cardiovascular:      Rate and Rhythm: Normal rate.   Pulmonary:      Effort: Pulmonary " effort is normal. No respiratory distress.      Breath sounds: Normal breath sounds.   Abdominal:      General: There is no distension.      Palpations: Abdomen is soft.      Tenderness: There is no abdominal tenderness.   Genitourinary:     Penis: Normal.       Testes: Normal.      Prostate: Normal.   Musculoskeletal:         General: Normal range of motion.      Cervical back: Normal range of motion and neck supple.   Skin:     General: Skin is warm and dry.   Neurological:      Mental Status: He is alert and oriented to person, place, and time.   Psychiatric:         Behavior: Behavior normal.         LABS:     CBC:   Lab Results   Component Value Date    WBC 7.04 2023    HGB 13.8 2023    HCT 40.9 2023    MCV 94 2023     2023       BMP:   Lab Results   Component Value Date    GLUCOSE 95 10/08/2018    CALCIUM 10.4 (H) 2024     2015    K 4.9 2024    CO2 28 2024     2024    BUN 19 2024    CREATININE 1.16 2024         IMAGIN/20/24  CT VENOGRAM OF THE LEFT LOWER EXTREMITY(IES) WITHOUT AND WITH IV CONTRAST        INDICATION:  I82.4Y2: Acute embolism and thrombosis of unspecified deep veins of left proximal lower extremity     COMPARISON: None.     TECHNIQUE:  CT venogram examination of the left lower extremity(ies) was performed according to standard protocol without and with with intravenous contrast.  This examination, like all CT scans performed in the [facility], was performed   utilizing techniques to minimize radiation dose exposure, including the use of iterative reconstruction and automated exposure control.  3D reconstructions were performed an independent workstation, and are supplied for review.   Rad dose 1684 mGy-cm .     IV Contrast:  100 mL of iohexol (OMNIPAQUE)        FINDINGS:     VASCULAR STRUCTURES: Visualized distal abdominal aorta with mild atherosclerotic changes. Patent common  iliac, external iliac arteries.  Visualized distal IVC, common iliac veins, external and internal iliac veins are patent. No mass effect on the left common iliac vein.  Common femoral vein is prominent with intraluminal thrombus. Superficial femoral vein demonstrated extensive thrombus.  Popliteal vein demonstrates thrombus.  Thrombus identified in the below knee veins.     EXTREMITY(IES)     SOFT TISSUE STRUCTURES: Fat-containing inguinal hernias bilaterally. Poorly distended bladder.     Subcentimeter left pelvic lymph nodes measuring 9.8 mm in image 52, 5.5 mm in image 57 and 7.2 mm in image 65 with no mass effect on adjacent vein.     OSSEOUS STRUCTURES: Streak artifact from left hip prosthesis. No acute fracture or destructive osseous lesion.     OTHER PERTINENT FINDINGS: None.              IMPRESSION:     Extensive DVT involving the left common femoral vein ,the superficial femoral vein and popliteal vein and below knee veins. No surrounding mass effect seen.  Subcentimeter left pelvic lymph nodes.  No findings to suggest May-Thurner's.    PROCEDURE:     Recent Results (from the past 2 hour(s))   POCT urine dip    Collection Time: 03/05/24  3:56 PM   Result Value Ref Range    LEUKOCYTE ESTERASE,UA n     NITRITE,UA n     SL AMB POCT UROBILINOGEN n     POCT URINE PROTEIN n      PH,UA n     BLOOD,UA n     SPECIFIC GRAVITY,UA 1.020     KETONES,UA n     BILIRUBIN,UA n     GLUCOSE, UA n      COLOR,UA yellow     CLARITY,UA clear    ]

## 2024-03-05 ENCOUNTER — OFFICE VISIT (OUTPATIENT)
Dept: UROLOGY | Facility: CLINIC | Age: 74
End: 2024-03-05
Payer: MEDICARE

## 2024-03-05 VITALS
SYSTOLIC BLOOD PRESSURE: 140 MMHG | WEIGHT: 243 LBS | HEIGHT: 76 IN | BODY MASS INDEX: 29.59 KG/M2 | DIASTOLIC BLOOD PRESSURE: 80 MMHG | OXYGEN SATURATION: 94 % | HEART RATE: 67 BPM

## 2024-03-05 DIAGNOSIS — R97.20 ELEVATED PROSTATE SPECIFIC ANTIGEN (PSA): ICD-10-CM

## 2024-03-05 LAB
CARDIOLIPIN IGA SER IA-ACNC: 15
CARDIOLIPIN IGG SER IA-ACNC: 0.8
CARDIOLIPIN IGM SER IA-ACNC: 3.1
SL AMB  POCT GLUCOSE, UA: NORMAL
SL AMB LEUKOCYTE ESTERASE,UA: NORMAL
SL AMB POCT BILIRUBIN,UA: NORMAL
SL AMB POCT BLOOD,UA: NORMAL
SL AMB POCT CLARITY,UA: CLEAR
SL AMB POCT COLOR,UA: YELLOW
SL AMB POCT KETONES,UA: NORMAL
SL AMB POCT NITRITE,UA: NORMAL
SL AMB POCT PH,UA: NORMAL
SL AMB POCT SPECIFIC GRAVITY,UA: 1.02
SL AMB POCT URINE PROTEIN: NORMAL
SL AMB POCT UROBILINOGEN: NORMAL

## 2024-03-05 PROCEDURE — 81002 URINALYSIS NONAUTO W/O SCOPE: CPT | Performed by: UROLOGY

## 2024-03-05 PROCEDURE — 99204 OFFICE O/P NEW MOD 45 MIN: CPT | Performed by: UROLOGY

## 2024-03-19 ENCOUNTER — HOSPITAL ENCOUNTER (OUTPATIENT)
Facility: MEDICAL CENTER | Age: 74
Discharge: HOME | End: 2024-03-19
Payer: MEDICARE

## 2024-03-19 DIAGNOSIS — M1A.0720 IDIOPATHIC CHRONIC GOUT OF LEFT ANKLE WITHOUT TOPHUS: ICD-10-CM

## 2024-03-19 DIAGNOSIS — R97.20 ELEVATED PROSTATE SPECIFIC ANTIGEN (PSA): ICD-10-CM

## 2024-03-19 PROCEDURE — 76377 3D RENDER W/INTRP POSTPROCES: CPT

## 2024-03-19 PROCEDURE — A9585 GADOBUTROL INJECTION: HCPCS | Performed by: UROLOGY

## 2024-03-19 PROCEDURE — 72197 MRI PELVIS W/O & W/DYE: CPT

## 2024-03-19 RX ORDER — GADOBUTROL 604.72 MG/ML
11 INJECTION INTRAVENOUS
Status: COMPLETED | OUTPATIENT
Start: 2024-03-19 | End: 2024-03-19

## 2024-03-19 RX ADMIN — GADOBUTROL 11 ML: 604.72 INJECTION INTRAVENOUS at 13:38

## 2024-03-20 RX ORDER — METHYLPREDNISOLONE 4 MG/1
TABLET ORAL
Qty: 21 TABLET | Refills: 0 | Status: SHIPPED | OUTPATIENT
Start: 2024-03-20 | End: 2024-03-27

## 2024-03-26 ENCOUNTER — TELEPHONE (OUTPATIENT)
Age: 74
End: 2024-03-26

## 2024-03-26 NOTE — TELEPHONE ENCOUNTER
Radiology Test Results - Radiology Calling with report update  Mri of Prostate    Pt under care of: Dr. Whitman    Imaging Completed:03/19/24    Significant Findings - Please review

## 2024-03-27 ENCOUNTER — OFFICE VISIT (OUTPATIENT)
Dept: VASCULAR SURGERY | Facility: CLINIC | Age: 74
End: 2024-03-27
Payer: MEDICARE

## 2024-03-27 VITALS
BODY MASS INDEX: 29.35 KG/M2 | OXYGEN SATURATION: 94 % | SYSTOLIC BLOOD PRESSURE: 138 MMHG | DIASTOLIC BLOOD PRESSURE: 90 MMHG | HEART RATE: 76 BPM | WEIGHT: 241 LBS | HEIGHT: 76 IN

## 2024-03-27 DIAGNOSIS — I82.4Y2 ACUTE DEEP VEIN THROMBOSIS (DVT) OF PROXIMAL VEIN OF LEFT LOWER EXTREMITY (HCC): Primary | ICD-10-CM

## 2024-03-27 PROCEDURE — 99213 OFFICE O/P EST LOW 20 MIN: CPT

## 2024-03-27 NOTE — ASSESSMENT & PLAN NOTE
Patient is reporting resolution of left lower extremity pain and improvement in swelling.  Patient does continue with swelling to his left ankle and foot by the end of the day as well as redness/numbness in his toes, however reports that this is improved.  Patient continues with compression stocking use on a daily basis and elevates his left leg frequently throughout the day.    Plan:  -Left lower extremity swelling appears significantly improved.  - As patient's symptoms are improving I do not believe there is any indication for repeat venous duplex of left lower extremity.  - Instructed patient to continue with urologic workup as recommended by hematology.  - Instructed patient to continue with conservative management with compression stockings, leg elevation, and regular exercise.  - Length of anticoagulation will ultimately depend on results of urologic workup.  Of note, patient also in the process of scheduling a colonoscopy.  -Patient instructed to notify the office should he develop any recurrent left lower extremity pain or worsening swelling.  -Patient may follow-up with vascular surgery as needed

## 2024-03-27 NOTE — PROGRESS NOTES
Assessment/Plan:    Acute deep vein thrombosis (DVT) of proximal vein of left lower extremity (HCC)  Patient is reporting resolution of left lower extremity pain and improvement in swelling.  Patient does continue with swelling to his left ankle and foot by the end of the day as well as redness/numbness in his toes, however reports that this is improved.  Patient continues with compression stocking use on a daily basis and elevates his left leg frequently throughout the day.    Plan:  -Left lower extremity swelling appears significantly improved.  - As patient's symptoms are improving I do not believe there is any indication for repeat venous duplex of left lower extremity.  - Instructed patient to continue with urologic workup as recommended by hematology.  - Instructed patient to continue with conservative management with compression stockings, leg elevation, and regular exercise.  - Length of anticoagulation will ultimately depend on results of urologic workup.  Of note, patient also in the process of scheduling a colonoscopy.  -Patient instructed to notify the office should he develop any recurrent left lower extremity pain or worsening swelling.  -Patient may follow-up with vascular surgery as needed       Diagnoses and all orders for this visit:    Acute deep vein thrombosis (DVT) of proximal vein of left lower extremity (HCC)          Subjective:      Patient ID: Erlin Hawkins is a 73 y.o. male.      Patient is a 73-year-old male, former smoker, with PMH HTN, HLD, OA, hyperparathyroidism, CHF, obesity, and ascending thoracic aortic aneurysm. Patient is presenting to the vascular surgery office for reevaluation of left lower extremity pain following the diagnosis of left lower extremity DVT.     Since patient's office visit in January he has been evaluated by hematology who are currently recommending 6 months of anticoagulation.  Hematology also recommended that patient be evaluated by urology at that time  "due to elevated PSA levels.  Patient underwent MRI of his prostate yesterday and plans to reach out to his urologist today to discuss results.  Patient is reporting resolution of left lower extremity pain and improvement in swelling.  Patient does continue with swelling to his left ankle and foot by the end of the day as well as numbness in his toes, however reports that this is improved.  Patient continues with compression stocking use on a daily basis and elevates his left leg frequently throughout the day.      The following portions of the patient's history were reviewed and updated as appropriate: allergies, current medications, past family history, past medical history, past social history, past surgical history, and problem list.    Review of Systems   Constitutional: Negative.  Negative for activity change.   HENT:  Positive for rhinorrhea.    Eyes: Negative.    Respiratory: Negative.  Negative for shortness of breath.    Cardiovascular:  Positive for leg swelling. Negative for chest pain.   Gastrointestinal: Negative.    Endocrine: Negative.    Genitourinary: Negative.    Musculoskeletal: Negative.    Skin:  Positive for color change. Negative for pallor and wound.   Allergic/Immunologic: Positive for environmental allergies.   Neurological:  Positive for numbness.   Hematological: Negative.    Psychiatric/Behavioral: Negative.         Objective:    /90 (BP Location: Left arm, Patient Position: Sitting, Cuff Size: Standard)   Pulse 76   Ht 6' 4\" (1.93 m)   Wt 109 kg (241 lb)   SpO2 94%   BMI 29.34 kg/m²        Physical Exam  Constitutional:       General: He is not in acute distress.     Appearance: Normal appearance.   HENT:      Head: Normocephalic and atraumatic.   Cardiovascular:      Rate and Rhythm: Normal rate and regular rhythm.      Pulses:           Dorsalis pedis pulses are 2+ on the right side and 2+ on the left side.        Posterior tibial pulses are 1+ on the right side and 1+ on " "the left side.      Heart sounds: Murmur heard.   Pulmonary:      Effort: Pulmonary effort is normal. No respiratory distress.      Breath sounds: Normal breath sounds.   Abdominal:      General: Bowel sounds are normal.      Palpations: Abdomen is soft.   Musculoskeletal:         General: No swelling or tenderness.      Right lower leg: No edema.      Left lower leg: No edema.   Skin:     General: Skin is warm and dry.      Capillary Refill: Capillary refill takes less than 2 seconds.      Findings: No lesion, rash or wound.   Neurological:      General: No focal deficit present.      Mental Status: He is alert and oriented to person, place, and time.   Psychiatric:         Mood and Affect: Mood normal.         Behavior: Behavior normal.     I have reviewed and made appropriate changes to the review of systems input by the medical assistant.    Vitals:    03/27/24 0852   BP: 138/90   BP Location: Left arm   Patient Position: Sitting   Cuff Size: Standard   Pulse: 76   SpO2: 94%   Weight: 109 kg (241 lb)   Height: 6' 4\" (1.93 m)       Patient Active Problem List   Diagnosis    Alcohol consumption of more than two drinks per day    Aneurysm of ascending aorta (HCC)    Colonic polyp    Idiopathic chronic gout of left ankle without tophus    Pure hypercholesterolemia    Essential hypertension    Malignant melanoma of skin (HCC)    Motion sickness    Osteoarthritis of left hip    Elevated PSA    History of left hip replacement    History of tobacco abuse    Anaphylaxis    Subacromial bursitis of right shoulder joint    Hypercalcemia    COVID-19    Hyperparathyroidism (HCC)    Thoracic aortic aneurysm without rupture, unspecified part (HCC)    Acute combined systolic and diastolic congestive heart failure (HCC)    Obesity (BMI 30.0-34.9)    Acute deep vein thrombosis (DVT) of proximal vein of left lower extremity (HCC)    Dizziness    Pain of left heel       Past Surgical History:   Procedure Laterality Date    " CHOLECYSTECTOMY      COLONOSCOPY  02/08/2022    [place] RPR/ADVMNT FLXR TDN N/Z/2 W/O FR GRAFT EA TENDON Right 08/27/2019    Procedure: REPAIR FLEXOR TENDON right small FINGER;  Surgeon: Renaldo Bravo MD;  Location: [facility];  Service: Orthopedics    SKIN CANCER EXCISION      TONSILLECTOMY      TOTAL HIP ARTHROPLASTY Left 06/2016       Family History   Problem Relation Age of Onset    Cancer Father     Arthritis Family     Colon polyps Family        Social History     Socioeconomic History    Marital status: /Civil Union     Spouse name: Not on file    Number of children: Not on file    Years of education: Not on file    Highest education level: Not on file   Occupational History    Not on file   Tobacco Use    Smoking status: Former     Current packs/day: 0.25     Average packs/day: 0.3 packs/day for 3.5 years (0.9 ttl pk-yrs)     Types: Cigarettes     Start date: 10/1/2020    Smokeless tobacco: Never   Vaping Use    Vaping status: Never Used   Substance and Sexual Activity    Alcohol use: Yes     Comment: DAILY, liquor    Drug use: Not Currently    Sexual activity: Not on file   Other Topics Concern    Not on file   Social History Narrative    Not on file     Social Determinants of Health     Financial Resource Strain: Not on file   Food Insecurity: Not on file   Transportation Needs: Not on file   Physical Activity: Not on file   Stress: Not on file   Social Connections: Not on file   Intimate Partner Violence: Not on file   Housing Stability: Not on file       Allergies   Allergen Reactions    Atorvastatin Myalgia    Honey Bee Venom Swelling    Rosuvastatin Myalgia         Current Outpatient Medications:     allopurinol (ZYLOPRIM) 100 mg tablet, Take 2 tablets (200 mg total) by mouth daily, Disp: 180 tablet, Rfl: 1    Ascorbic Acid (VITAMIN C) 1000 MG tablet, Take 1 tablet by mouth daily, Disp: , Rfl:     Cholecalciferol (Vitamin D3) 50 MCG (2000 UT) capsule, Take 1 capsule (2,000 Units total) by mouth  daily, Disp: , Rfl: 0    furosemide (LASIX) 20 mg tablet, Take 1 tablet daily as needed for lower extremity swelling.  Take potassium supplement if taking., Disp: 90 tablet, Rfl: 3    rivaroxaban (Xarelto) 20 mg tablet, Take 1 tablet (20 mg total) by mouth daily with breakfast, Disp: 30 tablet, Rfl: 4    EPINEPHrine (EPIPEN) 0.3 mg/0.3 mL SOAJ, Inject 0.3 mL (0.3 mg total) into a muscle once for 1 dose As needed for allergic reaction (Patient not taking: Reported on 1/10/2024), Disp: 0.3 mL, Rfl: 0    gabapentin (Neurontin) 100 mg capsule, Take 1 capsule (100 mg total) by mouth 3 (three) times a day, Disp: 90 capsule, Rfl: 0    olmesartan (BENICAR) 40 mg tablet, Take 1 tablet (40 mg total) by mouth daily (Patient not taking: Reported on 3/27/2024), Disp: 90 tablet, Rfl: 3    I have spent a total time of 25 minutes on 03/27/24 in caring for this patient including Prognosis, Risks and benefits of tx options, Instructions for management, Patient and family education, Importance of tx compliance, Risk factor reductions, Impressions, Counseling / Coordination of care, Documenting in the medical record, Reviewing / ordering tests, medicine, procedures  , and Obtaining or reviewing history  .

## 2024-03-28 ENCOUNTER — TELEPHONE (OUTPATIENT)
Dept: UROLOGY | Facility: CLINIC | Age: 74
End: 2024-03-28

## 2024-03-28 DIAGNOSIS — R97.20 ELEVATED PROSTATE SPECIFIC ANTIGEN (PSA): Primary | ICD-10-CM

## 2024-03-28 NOTE — TELEPHONE ENCOUNTER
Contacted patient to review his multiparametric MRI which demonstrates a PI-RADS 3 lesion.  Discussed the options with the patient to proceed with fusion biopsy to sample the PI-RADS 3 lesion.  Alternative option would be continued surveillance.  Patient would prefer simple surveillance and understands the risk of potential missed occult malignancy with avoidance of biopsy.  We agreed that he should repeat his PSA now and again in approximately 3 to 4 months.  Decision making for repeat multiparametric MRI can be performed depending on PSA trend.

## 2024-04-02 ENCOUNTER — APPOINTMENT (OUTPATIENT)
Dept: LAB | Facility: MEDICAL CENTER | Age: 74
End: 2024-04-02
Payer: MEDICARE

## 2024-04-02 DIAGNOSIS — R97.20 ELEVATED PROSTATE SPECIFIC ANTIGEN (PSA): ICD-10-CM

## 2024-04-02 LAB
ANION GAP SERPL CALCULATED.3IONS-SCNC: 7 MMOL/L (ref 4–13)
BUN SERPL-MCNC: 29 MG/DL (ref 5–25)
CALCIUM SERPL-MCNC: 9.4 MG/DL (ref 8.4–10.2)
CHLORIDE SERPL-SCNC: 102 MMOL/L (ref 96–108)
CO2 SERPL-SCNC: 30 MMOL/L (ref 21–32)
CREAT SERPL-MCNC: 1.29 MG/DL (ref 0.6–1.3)
GFR SERPL CREATININE-BSD FRML MDRD: 53 ML/MIN/1.73SQ M
GLUCOSE SERPL-MCNC: 109 MG/DL (ref 65–140)
POTASSIUM SERPL-SCNC: 4.4 MMOL/L (ref 3.5–5.3)
PSA SERPL-MCNC: 5.12 NG/ML (ref 0–4)
SODIUM SERPL-SCNC: 139 MMOL/L (ref 135–147)

## 2024-04-02 PROCEDURE — 84153 ASSAY OF PSA TOTAL: CPT

## 2024-04-02 PROCEDURE — 80048 BASIC METABOLIC PNL TOTAL CA: CPT

## 2024-04-02 PROCEDURE — 36415 COLL VENOUS BLD VENIPUNCTURE: CPT

## 2024-05-03 ENCOUNTER — TRANSCRIBE ORDERS (OUTPATIENT)
Dept: CARDIAC SURGERY | Facility: CLINIC | Age: 74
End: 2024-05-03

## 2024-05-03 DIAGNOSIS — I71.21 ANEURYSM OF ASCENDING AORTA WITHOUT RUPTURE (HCC): Primary | ICD-10-CM

## 2024-05-13 ENCOUNTER — TELEPHONE (OUTPATIENT)
Dept: GASTROENTEROLOGY | Facility: CLINIC | Age: 74
End: 2024-05-13

## 2024-05-13 ENCOUNTER — PREP FOR PROCEDURE (OUTPATIENT)
Dept: GASTROENTEROLOGY | Facility: CLINIC | Age: 74
End: 2024-05-13

## 2024-05-13 ENCOUNTER — OFFICE VISIT (OUTPATIENT)
Dept: FAMILY MEDICINE CLINIC | Facility: CLINIC | Age: 74
End: 2024-05-13
Payer: MEDICARE

## 2024-05-13 VITALS
BODY MASS INDEX: 31.33 KG/M2 | DIASTOLIC BLOOD PRESSURE: 78 MMHG | OXYGEN SATURATION: 96 % | SYSTOLIC BLOOD PRESSURE: 130 MMHG | HEART RATE: 71 BPM | WEIGHT: 252 LBS | HEIGHT: 75 IN

## 2024-05-13 DIAGNOSIS — Z85.038 PERSONAL HISTORY OF COLON CANCER: Primary | ICD-10-CM

## 2024-05-13 DIAGNOSIS — Z86.010 PERSONAL HISTORY OF COLONIC POLYPS: ICD-10-CM

## 2024-05-13 DIAGNOSIS — M1A.0720 IDIOPATHIC CHRONIC GOUT OF LEFT ANKLE WITHOUT TOPHUS: ICD-10-CM

## 2024-05-13 DIAGNOSIS — E66.9 OBESITY (BMI 30.0-34.9): Primary | ICD-10-CM

## 2024-05-13 DIAGNOSIS — E21.3 HYPERPARATHYROIDISM (HCC): ICD-10-CM

## 2024-05-13 DIAGNOSIS — K63.5 POLYP OF COLON, UNSPECIFIED PART OF COLON, UNSPECIFIED TYPE: ICD-10-CM

## 2024-05-13 DIAGNOSIS — I10 ESSENTIAL HYPERTENSION: ICD-10-CM

## 2024-05-13 DIAGNOSIS — I71.20 THORACIC AORTIC ANEURYSM WITHOUT RUPTURE, UNSPECIFIED PART (HCC): ICD-10-CM

## 2024-05-13 DIAGNOSIS — I50.41 ACUTE COMBINED SYSTOLIC AND DIASTOLIC CONGESTIVE HEART FAILURE (HCC): ICD-10-CM

## 2024-05-13 DIAGNOSIS — I82.4Y2 ACUTE DEEP VEIN THROMBOSIS (DVT) OF PROXIMAL VEIN OF LEFT LOWER EXTREMITY (HCC): ICD-10-CM

## 2024-05-13 DIAGNOSIS — I71.21 ANEURYSM OF ASCENDING AORTA WITHOUT RUPTURE (HCC): ICD-10-CM

## 2024-05-13 DIAGNOSIS — T75.3XXD MOTION SICKNESS, SUBSEQUENT ENCOUNTER: ICD-10-CM

## 2024-05-13 DIAGNOSIS — E83.52 HYPERCALCEMIA: ICD-10-CM

## 2024-05-13 DIAGNOSIS — R97.20 ELEVATED PSA: ICD-10-CM

## 2024-05-13 DIAGNOSIS — C43.9 MALIGNANT MELANOMA OF SKIN (HCC): ICD-10-CM

## 2024-05-13 PROBLEM — U07.1 COVID-19: Status: RESOLVED | Noted: 2022-01-12 | Resolved: 2024-05-13

## 2024-05-13 PROCEDURE — G0439 PPPS, SUBSEQ VISIT: HCPCS | Performed by: FAMILY MEDICINE

## 2024-05-13 PROCEDURE — 99214 OFFICE O/P EST MOD 30 MIN: CPT | Performed by: FAMILY MEDICINE

## 2024-05-13 RX ORDER — TIRZEPATIDE 10 MG/.5ML
10 INJECTION, SOLUTION SUBCUTANEOUS WEEKLY
Qty: 2 ML | Refills: 0 | Status: SHIPPED | OUTPATIENT
Start: 2024-08-05 | End: 2024-09-02

## 2024-05-13 RX ORDER — TIRZEPATIDE 12.5 MG/.5ML
12.5 INJECTION, SOLUTION SUBCUTANEOUS WEEKLY
Qty: 2 ML | Refills: 0 | Status: SHIPPED | OUTPATIENT
Start: 2024-09-02 | End: 2024-09-30

## 2024-05-13 RX ORDER — TIRZEPATIDE 5 MG/.5ML
5 INJECTION, SOLUTION SUBCUTANEOUS WEEKLY
Qty: 2 ML | Refills: 0 | Status: SHIPPED | OUTPATIENT
Start: 2024-06-10 | End: 2024-07-08

## 2024-05-13 RX ORDER — SCOLOPAMINE TRANSDERMAL SYSTEM 1 MG/1
1 PATCH, EXTENDED RELEASE TRANSDERMAL
Qty: 10 PATCH | Refills: 5 | Status: SHIPPED | OUTPATIENT
Start: 2024-05-13

## 2024-05-13 RX ORDER — TIRZEPATIDE 15 MG/.5ML
15 INJECTION, SOLUTION SUBCUTANEOUS WEEKLY
Qty: 2 ML | Refills: 0 | Status: SHIPPED | OUTPATIENT
Start: 2024-09-30 | End: 2024-10-28

## 2024-05-13 RX ORDER — TIRZEPATIDE 7.5 MG/.5ML
7.5 INJECTION, SOLUTION SUBCUTANEOUS WEEKLY
Qty: 2 ML | Refills: 0 | Status: SHIPPED | OUTPATIENT
Start: 2024-07-08 | End: 2024-08-05

## 2024-05-13 RX ORDER — TIRZEPATIDE 2.5 MG/.5ML
2.5 INJECTION, SOLUTION SUBCUTANEOUS WEEKLY
Qty: 2 ML | Refills: 0 | Status: SHIPPED | OUTPATIENT
Start: 2024-05-13 | End: 2024-06-10

## 2024-05-13 NOTE — PROGRESS NOTES
Assessment and Plan:     Problem List Items Addressed This Visit        Cardiovascular and Mediastinum    Aneurysm of ascending aorta (HCC)     Set up CT chest.         Relevant Orders    CBC and differential    Essential hypertension    Relevant Orders    CBC and differential    Comprehensive metabolic panel    Lipid Panel with Direct LDL reflex    Thoracic aortic aneurysm without rupture, unspecified part ([facility])     He is due for CT chest and will make sure this gets scheduled         Relevant Orders    CBC and differential    Comprehensive metabolic panel    Acute combined systolic and diastolic congestive heart failure (HCC)     Wt Readings from Last 3 Encounters:   05/13/24 114 kg (252 lb)   03/27/24 109 kg (241 lb)   03/05/24 110 kg (243 lb)     He remains quite stable and continues with furosemide daily.               Relevant Orders    CBC and differential    Comprehensive metabolic panel    Acute deep vein thrombosis (DVT) of proximal vein of left lower extremity (HCC)     He remains on Xarelto.  I did reach out to hematology as 6 months will be over at the end of June.  He is being monitored for potential prostate cancer.  He is willing to stay on Xarelto as a protective agent if we feel it is necessary but certainly would be happy to stop it.         Relevant Orders    CBC and differential       Digestive    Colonic polyp     He has a history of multiple colon polyps and he is due for colonoscopy and we will reach out to have this set up.            Endocrine    Hyperparathyroidism (HCC)     Check labs as outlined         Relevant Orders    CBC and differential    Comprehensive metabolic panel    PTH, intact       Musculoskeletal and Integument    Idiopathic chronic gout of left ankle without tophus    Relevant Orders    Uric acid    Malignant melanoma of skin (HCC)     He is following closely with dermatology            Urinary    Elevated PSA     Continue close follow-up with urology.            Other     Motion sickness    Relevant Medications    scopolamine (TRANSDERM-SCOP) 1 mg/3 days TD 72 hr patch    Hypercalcemia    Relevant Orders    CBC and differential    Comprehensive metabolic panel    PTH, intact    Obesity (BMI 30.0-34.9) - Primary    Relevant Medications    Zepbound 2.5 MG/0.5ML auto-injector    Zepbound 5 MG/0.5ML auto-injector (Start on 6/10/2024)    Zepbound 7.5 MG/0.5ML auto-injector (Start on 7/8/2024)    Zepbound 10 MG/0.5ML auto-injector (Start on 8/5/2024)    Zepbound 12.5 MG/0.5ML auto-injector (Start on 9/2/2024)    Zepbound 15 MG/0.5ML auto-injector (Start on 9/30/2024)        Preventive health issues were discussed with patient, and age appropriate screening tests were ordered as noted in patient's After Visit Summary.  Personalized health advice and appropriate referrals for health education or preventive services given if needed, as noted in patient's After Visit Summary.     History of Present Illness:     Patient presents for a Medicare Wellness Visit    She is as well as Medicare wellness visit.  Overall, he feels he is pretty stable.  He denies any ongoing shortness of breath or lower extremity swelling.  He remains very active without chest pain.  He is due for chest CT for his history of aortic aneurysm and he is also due for colonoscopy.  He does remain on anticoagulation for a notable DVT 6 months ago.  Fortunately, his pain has resolved as this is swelling.  He does continue on daily furosemide.       Patient Care Team:  Pascual Burgess Jr., MD as PCP - General  MD Donell Adamson DO Jennifer Anne Banzhof, DO     Review of Systems:     Review of Systems   Constitutional:  Negative for appetite change, chills, fatigue, fever and unexpected weight change.   HENT:  Negative for trouble swallowing.    Eyes:  Negative for visual disturbance.   Respiratory:  Negative for cough, chest tightness, shortness of breath and wheezing.    Cardiovascular:  Negative for chest  pain, palpitations and leg swelling.   Gastrointestinal:  Negative for abdominal distention, abdominal pain, blood in stool, constipation and diarrhea.   Endocrine: Negative for polyuria.   Genitourinary:  Negative for difficulty urinating and flank pain.   Musculoskeletal:  Negative for arthralgias and myalgias.   Skin:  Negative for rash.   Neurological:  Negative for dizziness and light-headedness.   Hematological:  Negative for adenopathy. Does not bruise/bleed easily.   Psychiatric/Behavioral:  Negative for dysphoric mood and sleep disturbance. The patient is not nervous/anxious.         Problem List:     Patient Active Problem List   Diagnosis   • Alcohol consumption of more than two drinks per day   • Aneurysm of ascending aorta (HCC)   • Colonic polyp   • Idiopathic chronic gout of left ankle without tophus   • Pure hypercholesterolemia   • Essential hypertension   • Malignant melanoma of skin (HCC)   • Motion sickness   • Osteoarthritis of left hip   • Elevated PSA   • History of left hip replacement   • History of tobacco abuse   • Anaphylaxis   • Subacromial bursitis of right shoulder joint   • Hypercalcemia   • Hyperparathyroidism (HCC)   • Thoracic aortic aneurysm without rupture, unspecified part (HCC)   • Acute combined systolic and diastolic congestive heart failure (HCC)   • Obesity (BMI 30.0-34.9)   • Acute deep vein thrombosis (DVT) of proximal vein of left lower extremity (HCC)   • Dizziness   • Pain of left heel      Past Medical and Surgical History:     Past Medical History:   Diagnosis Date   • Abnormal chest CT     RESOLVED: 15JUN2016   • Balance disorder     RESOLVED: 15JUN2016   • Colon polyp    • COVID-19 01/12/2022    Positive home test 1/10/22   • Elevated brain natriuretic peptide (BNP) level 05/12/2023   • Elevated d-dimer 05/12/2023   • Lumbar radiculopathy     RESOLVED: 15JUN2016   • Lumbar spondylosis     RESOLVED: 15JUN2016   • Melanoma (HCC)      Past Surgical History:    Procedure Laterality Date   • CHOLECYSTECTOMY     • COLONOSCOPY  02/08/2022   • NE RPR/ADVMNT FLXR TDN N/Z/2 W/O FR GRAFT EA TENDON Right 08/27/2019    Procedure: REPAIR FLEXOR TENDON right small FINGER;  Surgeon: Renaldo Bravo MD;  Location: [facility];  Service: Orthopedics   • SKIN CANCER EXCISION     • TONSILLECTOMY     • TOTAL HIP ARTHROPLASTY Left 06/2016      Family History:     Family History   Problem Relation Age of Onset   • Cancer Father    • Arthritis Family    • Colon polyps Family       Social History:     Social History     Socioeconomic History   • Marital status: /Civil Union     Spouse name: None   • Number of children: None   • Years of education: None   • Highest education level: None   Occupational History   • None   Tobacco Use   • Smoking status: Former     Current packs/day: 0.25     Average packs/day: 0.3 packs/day for 3.6 years (0.9 ttl pk-yrs)     Types: Cigarettes     Start date: 10/1/2020   • Smokeless tobacco: Never   Vaping Use   • Vaping status: Never Used   Substance and Sexual Activity   • Alcohol use: Yes     Comment: DAILY, liquor   • Drug use: Not Currently   • Sexual activity: None   Other Topics Concern   • None   Social History Narrative   • None     Social Determinants of Health     Financial Resource Strain: Not on file   Food Insecurity: No Food Insecurity (5/13/2024)    Hunger Vital Sign    • Worried About Running Out of Food in the Last Year: Never true    • Ran Out of Food in the Last Year: Never true   Transportation Needs: No Transportation Needs (5/13/2024)    PRAPARE - Transportation    • Lack of Transportation (Medical): No    • Lack of Transportation (Non-Medical): No   Physical Activity: Not on file   Stress: Not on file   Social Connections: Not on file   Intimate Partner Violence: Not on file   Housing Stability: Low Risk  (5/13/2024)    Housing Stability Vital Sign    • Unable to Pay for Housing in the Last Year: No    • Number of Places Lived in the  Last Year: 1    • Unstable Housing in the Last Year: No      Medications and Allergies:     Current Outpatient Medications   Medication Sig Dispense Refill   • allopurinol (ZYLOPRIM) 100 mg tablet Take 2 tablets (200 mg total) by mouth daily 180 tablet 1   • Ascorbic Acid (VITAMIN C) 1000 MG tablet Take 1 tablet by mouth daily     • Cholecalciferol (Vitamin D3) 50 MCG (2000 UT) capsule Take 1 capsule (2,000 Units total) by mouth daily  0   • furosemide (LASIX) 20 mg tablet Take 1 tablet daily as needed for lower extremity swelling.  Take potassium supplement if taking. 90 tablet 3   • olmesartan (BENICAR) 40 mg tablet Take 1 tablet (40 mg total) by mouth daily 90 tablet 3   • rivaroxaban (Xarelto) 20 mg tablet Take 1 tablet (20 mg total) by mouth daily with breakfast 30 tablet 4   • scopolamine (TRANSDERM-SCOP) 1 mg/3 days TD 72 hr patch Place 1 patch on the skin over 72 hours every third day 10 patch 5   • [START ON 8/5/2024] Zepbound 10 MG/0.5ML auto-injector Inject 0.5 mL (10 mg total) under the skin once a week for 28 days Do not start before August 5, 2024. 2 mL 0   • [START ON 9/2/2024] Zepbound 12.5 MG/0.5ML auto-injector Inject 0.5 mL (12.5 mg total) under the skin once a week for 28 days Do not start before September 2, 2024. 2 mL 0   • [START ON 9/30/2024] Zepbound 15 MG/0.5ML auto-injector Inject 0.5 mL (15 mg total) under the skin once a week for 28 days Do not start before September 30, 2024. 2 mL 0   • Zepbound 2.5 MG/0.5ML auto-injector Inject 0.5 mL (2.5 mg total) under the skin once a week for 28 days 2 mL 0   • [START ON 6/10/2024] Zepbound 5 MG/0.5ML auto-injector Inject 0.5 mL (5 mg total) under the skin once a week for 28 days Do not start before Skylar 10, 2024. 2 mL 0   • [START ON 7/8/2024] Zepbound 7.5 MG/0.5ML auto-injector Inject 0.5 mL (7.5 mg total) under the skin once a week for 28 days Do not start before July 8, 2024. 2 mL 0   • EPINEPHrine (EPIPEN) 0.3 mg/0.3 mL SOAJ Inject 0.3 mL (0.3  mg total) into a muscle once for 1 dose As needed for allergic reaction (Patient not taking: Reported on 1/10/2024) 0.3 mL 0     No current facility-administered medications for this visit.     Allergies   Allergen Reactions   • Atorvastatin Myalgia   • Honey Bee Venom Swelling   • Rosuvastatin Myalgia      Immunizations:     Immunization History   Administered Date(s) Administered   • COVID-19 MODERNA VACC 0.5 ML IM 01/27/2021, 02/24/2021, 01/03/2022   • COVID-19, unspecified 01/27/2021, 02/24/2021   • H1N1, All Formulations 01/06/2010, 11/30/2010   • INFLUENZA 11/18/2013, 10/28/2022   • Influenza Split High Dose Preservative Free IM 09/26/2014, 10/07/2015, 10/14/2016, 09/20/2017   • Influenza, high dose seasonal 0.7 mL 10/01/2018, 10/25/2019, 10/09/2020, 11/26/2021, 10/28/2022, 09/29/2023   • Influenza, seasonal, injectable 11/13/2006, 10/15/2009   • Pneumococcal Conjugate 13-Valent 10/14/2016   • Pneumococcal Polysaccharide PPV23 12/07/2017   • Zoster 12/19/2011, 11/18/2013      Health Maintenance:         Topic Date Due   • Colorectal Cancer Screening  06/02/2023   • Hepatitis C Screening  Completed         Topic Date Due   • COVID-19 Vaccine (6 - 2023-24 season) 09/01/2023      Medicare Screening Tests and Risk Assessments:     Erlin is here for his Subsequent Wellness visit.     Health Risk Assessment:   Patient rates overall health as very good. Patient feels that their physical health rating is slightly better. Patient is very satisfied with their life. Eyesight was rated as same. Hearing was rated as same. Patient feels that their emotional and mental health rating is same. Patients states they are never, rarely angry. Patient states they are sometimes unusually tired/fatigued. Pain experienced in the last 7 days has been none. Patient states that he has experienced no weight loss or gain in last 6 months.     Depression Screening:   PHQ-2 Score: 0      Fall Risk Screening:   In the past year, patient has  experienced: no history of falling in past year      Home Safety:  Patient does not have trouble with stairs inside or outside of their home. Patient has working smoke alarms and has working carbon monoxide detector. Home safety hazards include: none.     Nutrition:   Current diet is Regular.     Medications:   Patient is not currently taking any over-the-counter supplements. Patient is able to manage medications.     Activities of Daily Living (ADLs)/Instrumental Activities of Daily Living (IADLs):   Walk and transfer into and out of bed and chair?: Yes  Dress and groom yourself?: Yes    Bathe or shower yourself?: Yes    Feed yourself? Yes  Do your laundry/housekeeping?: Yes  Manage your money, pay your bills and track your expenses?: Yes  Make your own meals?: Yes    Do your own shopping?: Yes    Advance Care Planning:   Living will: Yes    Durable POA for healthcare: Yes    Advanced directive: Yes    End of Life Decisions reviewed with patient: Yes      Cognitive Screening:   Provider or family/friend/caregiver concerned regarding cognition?: No    PREVENTIVE SCREENINGS      Cardiovascular Screening:    General: Screening Not Indicated and History Lipid Disorder      Diabetes Screening:     General: Screening Current      Colorectal Cancer Screening:     General: Risks and Benefits Discussed    Due for: Colonoscopy - High Risk      Prostate Cancer Screening:    General: Screening Not Indicated      Abdominal Aortic Aneurysm (AAA) Screening:    Risk factors include: age between 65-76 yo and tobacco use        Lung Cancer Screening:     General: Screening Not Indicated      Hepatitis C Screening:    General: Screening Current    Screening, Brief Intervention, and Referral to Treatment (SBIRT)    Screening  Typical number of drinks in a day: 3  Typical number of drinks in a week: 21  Interpretation: Low risk drinking behavior.    Single Item Drug Screening:  How often have you used an illegal drug (including  "marijuana) or a prescription medication for non-medical reasons in the past year? never    Single Item Drug Screen Score: 0  Interpretation: Negative screen for possible drug use disorder    Brief Intervention  Healthy alcohol use/limits discussed.     Annual Depression Screening  Time spent screening and evaluating the patient for depression during today's encounter was 5 minutes.    No results found.     Physical Exam:     /78 (BP Location: Left arm, Patient Position: Sitting, Cuff Size: Large)   Pulse 71   Ht 6' 3\" (1.905 m)   Wt 114 kg (252 lb)   SpO2 96%   BMI 31.50 kg/m²     Physical Exam  Constitutional:       General: He is not in acute distress.     Appearance: Normal appearance. He is well-developed. He is not diaphoretic.   HENT:      Head: Normocephalic.      Right Ear: External ear normal.      Left Ear: External ear normal.      Nose: Nose normal.   Eyes:      General:         Right eye: No discharge.         Left eye: No discharge.      Conjunctiva/sclera: Conjunctivae normal.      Pupils: Pupils are equal, round, and reactive to light.   Neck:      Thyroid: No thyromegaly.      Trachea: No tracheal deviation.   Cardiovascular:      Rate and Rhythm: Normal rate and regular rhythm.      Heart sounds: Normal heart sounds. No murmur heard.     No friction rub.   Pulmonary:      Effort: Pulmonary effort is normal. No respiratory distress.      Breath sounds: Normal breath sounds. No wheezing.   Chest:      Chest wall: No tenderness.   Abdominal:      General: There is no distension.      Palpations: There is no mass.      Tenderness: There is no abdominal tenderness. There is no guarding or rebound.      Hernia: No hernia is present.   Musculoskeletal:         General: No swelling or deformity.      Cervical back: Normal range of motion.      Right lower leg: No edema.      Left lower leg: No edema.   Skin:     Findings: No erythema or rash.   Neurological:      General: No focal deficit " present.      Mental Status: He is alert.      Cranial Nerves: No cranial nerve deficit.      Coordination: Coordination normal.   Psychiatric:         Thought Content: Thought content normal.          Pascual Valencia Jr, MD

## 2024-05-13 NOTE — ASSESSMENT & PLAN NOTE
He remains on Xarelto.  I did reach out to hematology as 6 months will be over at the end of June.  He is being monitored for potential prostate cancer.  He is willing to stay on Xarelto as a protective agent if we feel it is necessary but certainly would be happy to stop it.

## 2024-05-13 NOTE — PATIENT INSTRUCTIONS
Medicare Preventive Visit Patient Instructions  Thank you for completing your Welcome to Medicare Visit or Medicare Annual Wellness Visit today. Your next wellness visit will be due in one year (5/14/2025).  The screening/preventive services that you may require over the next 5-10 years are detailed below. Some tests may not apply to you based off risk factors and/or age. Screening tests ordered at today's visit but not completed yet may show as past due. Also, please note that scanned in results may not display below.  Preventive Screenings:  Service Recommendations Previous Testing/Comments   Colorectal Cancer Screening  Colonoscopy    Fecal Occult Blood Test (FOBT)/Fecal Immunochemical Test (FIT)  Fecal DNA/Cologuard Test  Flexible Sigmoidoscopy Age: 45-75 years old   Colonoscopy: every 10 years (May be performed more frequently if at higher risk)  OR  FOBT/FIT: every 1 year  OR  Cologuard: every 3 years  OR  Sigmoidoscopy: every 5 years  Screening may be recommended earlier than age 45 if at higher risk for colorectal cancer. Also, an individualized decision between you and your healthcare provider will decide whether screening between the ages of 76-85 would be appropriate. Colonoscopy: 06/02/2022  FOBT/FIT: Not on file  Cologuard: Not on file  Sigmoidoscopy: Not on file          Prostate Cancer Screening Individualized decision between patient and health care provider in men between ages of 55-69   Medicare will cover every 12 months beginning on the day after your 50th birthday PSA: 5.12 ng/mL           Hepatitis C Screening Once for adults born between 1945 and 1965  More frequently in patients at high risk for Hepatitis C Hep C Antibody: 10/03/2018        Diabetes Screening 1-2 times per year if you're at risk for diabetes or have pre-diabetes Fasting glucose: 103 mg/dL (9/29/2023)  A1C: No results in last 5 years (No results in last 5 years)      Cholesterol Screening Once every 5 years if you don't have a  lipid disorder. May order more often based on risk factors. Lipid panel: 05/11/2023         Other Preventive Screenings Covered by Medicare:  Abdominal Aortic Aneurysm (AAA) Screening: covered once if your at risk. You're considered to be at risk if you have a family history of AAA or a male between the age of 65-75 who smoking at least 100 cigarettes in your lifetime.  Lung Cancer Screening: covers low dose CT scan once per year if you meet all of the following conditions: (1) Age 55-77; (2) No signs or symptoms of lung cancer; (3) Current smoker or have quit smoking within the last 15 years; (4) You have a tobacco smoking history of at least 20 pack years (packs per day x number of years you smoked); (5) You get a written order from a healthcare provider.  Glaucoma Screening: covered annually if you're considered high risk: (1) You have diabetes OR (2) Family history of glaucoma OR (3)  aged 50 and older OR (4)  American aged 65 and older  Osteoporosis Screening: covered every 2 years if you meet one of the following conditions: (1) Have a vertebral abnormality; (2) On glucocorticoid therapy for more than 3 months; (3) Have primary hyperparathyroidism; (4) On osteoporosis medications and need to assess response to drug therapy.  HIV Screening: covered annually if you're between the age of 15-65. Also covered annually if you are younger than 15 and older than 65 with risk factors for HIV infection. For pregnant patients, it is covered up to 3 times per pregnancy.    Immunizations:  Immunization Recommendations   Influenza Vaccine Annual influenza vaccination during flu season is recommended for all persons aged >= 6 months who do not have contraindications   Pneumococcal Vaccine   * Pneumococcal conjugate vaccine = PCV13 (Prevnar 13), PCV15 (Vaxneuvance), PCV20 (Prevnar 20)  * Pneumococcal polysaccharide vaccine = PPSV23 (Pneumovax) Adults 19-63 yo with certain risk factors or if 65+ yo  If  never received any pneumonia vaccine: recommend Prevnar 20 (PCV20)  Give PCV20 if previously received 1 dose of PCV13 or PPSV23   Hepatitis B Vaccine 3 dose series if at intermediate or high risk (ex: diabetes, end stage renal disease, liver disease)   Respiratory syncytial virus (RSV) Vaccine - COVERED BY MEDICARE PART D  * RSVPreF3 (Arexvy) CDC recommends that adults 60 years of age and older may receive a single dose of RSV vaccine using shared clinical decision-making (SCDM)   Tetanus (Td) Vaccine - COST NOT COVERED BY MEDICARE PART B Following completion of primary series, a booster dose should be given every 10 years to maintain immunity against tetanus. Td may also be given as tetanus wound prophylaxis.   Tdap Vaccine - COST NOT COVERED BY MEDICARE PART B Recommended at least once for all adults. For pregnant patients, recommended with each pregnancy.   Shingles Vaccine (Shingrix) - COST NOT COVERED BY MEDICARE PART B  2 shot series recommended in those 19 years and older who have or will have weakened immune systems or those 50 years and older     Health Maintenance Due:      Topic Date Due   • Colorectal Cancer Screening  06/02/2023   • Hepatitis C Screening  Completed     Immunizations Due:      Topic Date Due   • COVID-19 Vaccine (6 - 2023-24 season) 09/01/2023     Advance Directives   What are advance directives?  Advance directives are legal documents that state your wishes and plans for medical care. These plans are made ahead of time in case you lose your ability to make decisions for yourself. Advance directives can apply to any medical decision, such as the treatments you want, and if you want to donate organs.   What are the types of advance directives?  There are many types of advance directives, and each state has rules about how to use them. You may choose a combination of any of the following:  Living will:  This is a written record of the treatment you want. You can also choose which  treatments you do not want, which to limit, and which to stop at a certain time. This includes surgery, medicine, IV fluid, and tube feedings.   Durable power of  for healthcare (DPAHC):  This is a written record that states who you want to make healthcare choices for you when you are unable to make them for yourself. This person, called a proxy, is usually a family member or a friend. You may choose more than 1 proxy.  Do not resuscitate (DNR) order:  A DNR order is used in case your heart stops beating or you stop breathing. It is a request not to have certain forms of treatment, such as CPR. A DNR order may be included in other types of advance directives.  Medical directive:  This covers the care that you want if you are in a coma, near death, or unable to make decisions for yourself. You can list the treatments you want for each condition. Treatment may include pain medicine, surgery, blood transfusions, dialysis, IV or tube feedings, and a ventilator (breathing machine).  Values history:  This document has questions about your views, beliefs, and how you feel and think about life. This information can help others choose the care that you would choose.  Why are advance directives important?  An advance directive helps you control your care. Although spoken wishes may be used, it is better to have your wishes written down. Spoken wishes can be misunderstood, or not followed. Treatments may be given even if you do not want them. An advance directive may make it easier for your family to make difficult choices about your care.   Weight Management   Why it is important to manage your weight:  Being overweight increases your risk of health conditions such as heart disease, high blood pressure, type 2 diabetes, and certain types of cancer. It can also increase your risk for osteoarthritis, sleep apnea, and other respiratory problems. Aim for a slow, steady weight loss. Even a small amount of weight loss can  lower your risk of health problems.  How to lose weight safely:  A safe and healthy way to lose weight is to eat fewer calories and get regular exercise. You can lose up about 1 pound a week by decreasing the number of calories you eat by 500 calories each day.   Healthy meal plan for weight management:  A healthy meal plan includes a variety of foods, contains fewer calories, and helps you stay healthy. A healthy meal plan includes the following:  Eat whole-grain foods more often.  A healthy meal plan should contain fiber. Fiber is the part of grains, fruits, and vegetables that is not broken down by your body. Whole-grain foods are healthy and provide extra fiber in your diet. Some examples of whole-grain foods are whole-wheat breads and pastas, oatmeal, brown rice, and bulgur.  Eat a variety of vegetables every day.  Include dark, leafy greens such as spinach, kale, sanjay greens, and mustard greens. Eat yellow and orange vegetables such as carrots, sweet potatoes, and winter squash.   Eat a variety of fruits every day.  Choose fresh or canned fruit (canned in its own juice or light syrup) instead of juice. Fruit juice has very little or no fiber.  Eat low-fat dairy foods.  Drink fat-free (skim) milk or 1% milk. Eat fat-free yogurt and low-fat cottage cheese. Try low-fat cheeses such as mozzarella and other reduced-fat cheeses.  Choose meat and other protein foods that are low in fat.  Choose beans or other legumes such as split peas or lentils. Choose fish, skinless poultry (chicken or turkey), or lean cuts of red meat (beef or pork). Before you cook meat or poultry, cut off any visible fat.   Use less fat and oil.  Try baking foods instead of frying them. Add less fat, such as margarine, sour cream, regular salad dressing and mayonnaise to foods. Eat fewer high-fat foods. Some examples of high-fat foods include french fries, doughnuts, ice cream, and cakes.  Eat fewer sweets.  Limit foods and drinks that are  high in sugar. This includes candy, cookies, regular soda, and sweetened drinks.  Exercise:  Exercise at least 30 minutes per day on most days of the week. Some examples of exercise include walking, biking, dancing, and swimming. You can also fit in more physical activity by taking the stairs instead of the elevator or parking farther away from stores. Ask your healthcare provider about the best exercise plan for you.      © Copyright [employer] 2018 Information is for End User's use only and may not be sold, redistributed or otherwise used for commercial purposes. All illustrations and images included in CareNotes® are the copyrighted property of [employer] or [employer]

## 2024-05-13 NOTE — ASSESSMENT & PLAN NOTE
Wt Readings from Last 3 Encounters:   05/13/24 114 kg (252 lb)   03/27/24 109 kg (241 lb)   03/05/24 110 kg (243 lb)     He remains quite stable and continues with furosemide daily.

## 2024-05-13 NOTE — ASSESSMENT & PLAN NOTE
He has a history of multiple colon polyps and he is due for colonoscopy and we will reach out to have this set up.

## 2024-05-13 NOTE — PROGRESS NOTES
Assessment and Plan:     Problem List Items Addressed This Visit       Aneurysm of ascending aorta (HCC)     Set up CT chest.         Relevant Orders    CBC and differential    Colonic polyp     He has a history of multiple colon polyps and he is due for colonoscopy and we will reach out to have this set up.         Idiopathic chronic gout of left ankle without tophus    Relevant Orders    Uric acid    Essential hypertension    Relevant Orders    CBC and differential    Comprehensive metabolic panel    Lipid Panel with Direct LDL reflex    Malignant melanoma of skin (HCC)     He is following closely with dermatology         Motion sickness    Relevant Medications    scopolamine (TRANSDERM-SCOP) 1 mg/3 days TD 72 hr patch    Elevated PSA     Continue close follow-up with urology.         Hypercalcemia    Relevant Orders    CBC and differential    Comprehensive metabolic panel    PTH, intact    Hyperparathyroidism ([facility])     Check labs as outlined         Relevant Orders    CBC and differential    Comprehensive metabolic panel    PTH, intact    Thoracic aortic aneurysm without rupture, unspecified part ([facility])     He is due for CT chest and will make sure this gets scheduled         Relevant Orders    CBC and differential    Comprehensive metabolic panel    Acute combined systolic and diastolic congestive heart failure (HCC)     Wt Readings from Last 3 Encounters:   05/13/24 114 kg (252 lb)   03/27/24 109 kg (241 lb)   03/05/24 110 kg (243 lb)     He remains quite stable and continues with furosemide daily.               Relevant Orders    CBC and differential    Comprehensive metabolic panel    Obesity (BMI 30.0-34.9) - Primary    Relevant Medications    Zepbound 2.5 MG/0.5ML auto-injector    Zepbound 5 MG/0.5ML auto-injector (Start on 6/10/2024)    Zepbound 7.5 MG/0.5ML auto-injector (Start on 7/8/2024)    Zepbound 10 MG/0.5ML auto-injector (Start on 8/5/2024)    Zepbound 12.5 MG/0.5ML auto-injector (Start on  9/2/2024)    Zepbound 15 MG/0.5ML auto-injector (Start on 9/30/2024)    Acute deep vein thrombosis (DVT) of proximal vein of left lower extremity (HCC)     He remains on Xarelto.  I did reach out to hematology as 6 months will be over at the end of June.  He is being monitored for potential prostate cancer.  He is willing to stay on Xarelto as a protective agent if we feel it is necessary but certainly would be happy to stop it.         Relevant Orders    CBC and differential        Preventive health issues were discussed with patient, and age appropriate screening tests were ordered as noted in patient's After Visit Summary.  Personalized health advice and appropriate referrals for health education or preventive services given if needed, as noted in patient's After Visit Summary.     History of Present Illness:     Patient presents for a Medicare Wellness Visit    HPI   Patient Care Team:  Pascual Burgess Jr., MD as PCP - General  MD Donell Adamson DO Jennifer Anne Banzhof, DO     Review of Systems:     Review of Systems     Problem List:     Patient Active Problem List   Diagnosis    Alcohol consumption of more than two drinks per day    Aneurysm of ascending aorta (HCC)    Colonic polyp    Idiopathic chronic gout of left ankle without tophus    Pure hypercholesterolemia    Essential hypertension    Malignant melanoma of skin (HCC)    Motion sickness    Osteoarthritis of left hip    Elevated PSA    History of left hip replacement    History of tobacco abuse    Anaphylaxis    Subacromial bursitis of right shoulder joint    Hypercalcemia    Hyperparathyroidism (HCC)    Thoracic aortic aneurysm without rupture, unspecified part (HCC)    Acute combined systolic and diastolic congestive heart failure (HCC)    Obesity (BMI 30.0-34.9)    Acute deep vein thrombosis (DVT) of proximal vein of left lower extremity (HCC)    Dizziness    Pain of left heel      Past Medical and Surgical History:     Past  Medical History:   Diagnosis Date    Abnormal chest CT     RESOLVED: 15JUN2016    Balance disorder     RESOLVED: 15JUN2016    Colon polyp     COVID-19 01/12/2022    Positive home test 1/10/22    Elevated brain natriuretic peptide (BNP) level 05/12/2023    Elevated d-dimer 05/12/2023    Lumbar radiculopathy     RESOLVED: 15JUN2016    Lumbar spondylosis     RESOLVED: 15JUN2016    Melanoma (HCC)      Past Surgical History:   Procedure Laterality Date    CHOLECYSTECTOMY      COLONOSCOPY  02/08/2022    [facility] RPR/ADVMNT FLXR TDN N/Z/2 W/O FR GRAFT EA TENDON Right 08/27/2019    Procedure: REPAIR FLEXOR TENDON right small FINGER;  Surgeon: Renaldo Bravo MD;  Location: [facility];  Service: Orthopedics    SKIN CANCER EXCISION      TONSILLECTOMY      TOTAL HIP ARTHROPLASTY Left 06/2016      Family History:     Family History   Problem Relation Age of Onset    Cancer Father     Arthritis Family     Colon polyps Family       Social History:     Social History     Socioeconomic History    Marital status: /Civil Union     Spouse name: None    Number of children: None    Years of education: None    Highest education level: None   Occupational History    None   Tobacco Use    Smoking status: Former     Current packs/day: 0.25     Average packs/day: 0.3 packs/day for 3.6 years (0.9 ttl pk-yrs)     Types: Cigarettes     Start date: 10/1/2020    Smokeless tobacco: Never   Vaping Use    Vaping status: Never Used   Substance and Sexual Activity    Alcohol use: Yes     Comment: DAILY, liquor    Drug use: Not Currently    Sexual activity: None   Other Topics Concern    None   Social History Narrative    None     Social Determinants of Health     Financial Resource Strain: Not on file   Food Insecurity: No Food Insecurity (5/13/2024)    Hunger Vital Sign     Worried About Running Out of Food in the Last Year: Never true     Ran Out of Food in the Last Year: Never true   Transportation Needs: No Transportation Needs (5/13/2024)     PRAPARE - Transportation     Lack of Transportation (Medical): No     Lack of Transportation (Non-Medical): No   Physical Activity: Not on file   Stress: Not on file   Social Connections: Not on file   Intimate Partner Violence: Not on file   Housing Stability: Low Risk  (5/13/2024)    Housing Stability Vital Sign     Unable to Pay for Housing in the Last Year: No     Number of Places Lived in the Last Year: 1     Unstable Housing in the Last Year: No      Medications and Allergies:     Current Outpatient Medications   Medication Sig Dispense Refill    allopurinol (ZYLOPRIM) 100 mg tablet Take 2 tablets (200 mg total) by mouth daily 180 tablet 1    Ascorbic Acid (VITAMIN C) 1000 MG tablet Take 1 tablet by mouth daily      Cholecalciferol (Vitamin D3) 50 MCG (2000 UT) capsule Take 1 capsule (2,000 Units total) by mouth daily  0    furosemide (LASIX) 20 mg tablet Take 1 tablet daily as needed for lower extremity swelling.  Take potassium supplement if taking. 90 tablet 3    olmesartan (BENICAR) 40 mg tablet Take 1 tablet (40 mg total) by mouth daily 90 tablet 3    rivaroxaban (Xarelto) 20 mg tablet Take 1 tablet (20 mg total) by mouth daily with breakfast 30 tablet 4    scopolamine (TRANSDERM-SCOP) 1 mg/3 days TD 72 hr patch Place 1 patch on the skin over 72 hours every third day 10 patch 5    [START ON 8/5/2024] Zepbound 10 MG/0.5ML auto-injector Inject 0.5 mL (10 mg total) under the skin once a week for 28 days Do not start before August 5, 2024. 2 mL 0    [START ON 9/2/2024] Zepbound 12.5 MG/0.5ML auto-injector Inject 0.5 mL (12.5 mg total) under the skin once a week for 28 days Do not start before September 2, 2024. 2 mL 0    [START ON 9/30/2024] Zepbound 15 MG/0.5ML auto-injector Inject 0.5 mL (15 mg total) under the skin once a week for 28 days Do not start before September 30, 2024. 2 mL 0    Zepbound 2.5 MG/0.5ML auto-injector Inject 0.5 mL (2.5 mg total) under the skin once a week for 28 days 2 mL 0    [START  ON 6/10/2024] Zepbound 5 MG/0.5ML auto-injector Inject 0.5 mL (5 mg total) under the skin once a week for 28 days Do not start before Skylar 10, 2024. 2 mL 0    [START ON 7/8/2024] Zepbound 7.5 MG/0.5ML auto-injector Inject 0.5 mL (7.5 mg total) under the skin once a week for 28 days Do not start before July 8, 2024. 2 mL 0    EPINEPHrine (EPIPEN) 0.3 mg/0.3 mL SOAJ Inject 0.3 mL (0.3 mg total) into a muscle once for 1 dose As needed for allergic reaction (Patient not taking: Reported on 1/10/2024) 0.3 mL 0     No current facility-administered medications for this visit.     Allergies   Allergen Reactions    Atorvastatin Myalgia    Honey Bee Venom Swelling    Rosuvastatin Myalgia      Immunizations:     Immunization History   Administered Date(s) Administered    COVID-19 MODERNA VACC 0.5 ML IM 01/27/2021, 02/24/2021, 01/03/2022    COVID-19, unspecified 01/27/2021, 02/24/2021    H1N1, All Formulations 01/06/2010, 11/30/2010    INFLUENZA 11/18/2013, 10/28/2022    Influenza Split High Dose Preservative Free IM 09/26/2014, 10/07/2015, 10/14/2016, 09/20/2017    Influenza, high dose seasonal 0.7 mL 10/01/2018, 10/25/2019, 10/09/2020, 11/26/2021, 10/28/2022, 09/29/2023    Influenza, seasonal, injectable 11/13/2006, 10/15/2009    Pneumococcal Conjugate 13-Valent 10/14/2016    Pneumococcal Polysaccharide PPV23 12/07/2017    Zoster 12/19/2011, 11/18/2013      Health Maintenance:         Topic Date Due    Colorectal Cancer Screening  06/02/2023    Hepatitis C Screening  Completed         Topic Date Due    COVID-19 Vaccine (6 - 2023-24 season) 09/01/2023      Medicare Screening Tests and Risk Assessments:     Erlin is here for his Subsequent Wellness visit.     Health Risk Assessment:   Patient rates overall health as very good. Patient feels that their physical health rating is slightly better. Patient is very satisfied with their life. Eyesight was rated as same. Hearing was rated as same. Patient feels that their emotional  and mental health rating is same. Patients states they are never, rarely angry. Patient states they are sometimes unusually tired/fatigued. Pain experienced in the last 7 days has been none. Patient states that he has experienced weight loss or gain in last 6 months.     Depression Screening:   PHQ-2 Score: 0      Fall Risk Screening:   In the past year, patient has experienced: no history of falling in past year      Home Safety:  Patient has trouble with stairs inside or outside of their home. Patient has working smoke alarms and has working carbon monoxide detector. Home safety hazards include: none.     Nutrition:   Current diet is Regular.     Medications:   Patient is not currently taking any over-the-counter supplements. Patient is able to manage medications.     Activities of Daily Living (ADLs)/Instrumental Activities of Daily Living (IADLs):   Walk and transfer into and out of bed and chair?: Yes  Dress and groom yourself?: Yes    Bathe or shower yourself?: Yes    Feed yourself? Yes  Do your laundry/housekeeping?: Yes  Manage your money, pay your bills and track your expenses?: Yes  Make your own meals?: Yes    Do your own shopping?: Yes    Previous Hospitalizations:   Any hospitalizations or ED visits within the last 12 months?: Yes      PREVENTIVE SCREENINGS      Cardiovascular Screening:    General: Screening Not Indicated and History Lipid Disorder      Diabetes Screening:     General: Screening Current      Prostate Cancer Screening:    General: Screening Not Indicated      Abdominal Aortic Aneurysm (AAA) Screening:    Risk factors include: age between 65-76 yo and tobacco use        Lung Cancer Screening:     General: Screening Not Indicated      Hepatitis C Screening:    General: Screening Current    Screening, Brief Intervention, and Referral to Treatment (SBIRT)    Screening  Typical number of drinks in a day: 3  Typical number of drinks in a week: 21  Interpretation: Low risk drinking  "behavior.    AUDIT-C Screenin) How often did you have a drink containing alcohol in the past year? 4 or more times a week  2) How many drinks did you have on a typical day when you were drinking in the past year? 3 to 4  3) How often did you have 6 or more drinks on one occasion in the past year? never    AUDIT-C Score: 4  Interpretation: Score 4-12 (male): POSITIVE screen for alcohol misuse    AUDIT Screenin) How often during the last year have you found that you were not able to stop drinking once you had started? 0 - never  5) How often during the last year have you failed to do what was normally expected from you because of drinking? 0 - never  6) How often during the last year have you needed a first drink in the morning to get yourself going after a heavy drinking session? 0 - never  7) How often during the last year have you had a feeling of guilt or remorse after drinking? 0 - never  8) How often during the last year have you been unable to remember what happened the night before because you had been drinking? 0 - never  9) Have you or someone else been injured as a result of your drinking? 0 - no  10) Has a relative or friend or a doctor or another health worker been concerned about your drinking or suggested you cut down? 0 - no    AUDIT Score: 4  Interpretation: Low risk alcohol consumption    Single Item Drug Screening:  How often have you used an illegal drug (including marijuana) or a prescription medication for non-medical reasons in the past year? never    Single Item Drug Screen Score: 0  Interpretation: Negative screen for possible drug use disorder    No results found.     Physical Exam:     /78 (BP Location: Left arm, Patient Position: Sitting, Cuff Size: Large)   Pulse 71   Ht 6' 3\" (1.905 m)   Wt 114 kg (252 lb)   SpO2 96%   BMI 31.50 kg/m²     Physical Exam     Pascual Valencia Jr, MD  "

## 2024-05-15 ENCOUNTER — TELEPHONE (OUTPATIENT)
Dept: FAMILY MEDICINE CLINIC | Facility: CLINIC | Age: 74
End: 2024-05-15

## 2024-05-15 NOTE — TELEPHONE ENCOUNTER
"----- Message from Pascual Burgess MD sent at 5/14/2024  5:52 PM EDT -----  Staff, please let the patient know that I did reach out to hematology and they agree with the thought that continuing anticoagulation (blood thinner) would probably be a good idea especially in light of his other medical issues.  I would certainly agree with this approach and he was comfortable with that previously.  Lets continue with his blood thinner to help prevent any further issues, especially since his clot created such problems.  I can call in with any questions.  ----- Message -----  From: Magi Bradford PA-C  Sent: 5/13/2024  10:27 AM EDT  To: Pascual Burgess Jr., MD    Hi!    I would favor continuing on the blood thinner. I think his risks are more for clotting than bleeding. With the likely prostate cancer on imaging, over due for colonoscopy after having 16 polyps removed (details in my note), and skin cancer hx (not sure if his visit is up to date now, was not when I saw him) -- favor continued anticoagulation.     Blood thinner could be held at this time for 2 days prior to procedures/biopsies though.     Thanks!  Darrin   ----- Message -----  From: Pascual Burgess Jr., MD  Sent: 5/13/2024   9:04 AM EDT  To: KLAUDIA Bravo,  I saw Alejandro today.  He is doing well.  He is approaching the 6-month point for his DVT treatment.  As you probably saw, he is choosing \"watchful waiting\" for his elevated PSA.  Urology will be following him closely.  I was wondering from the hematology perspective if we think he should continue on anticoagulation or if he is okay to stop it at 6 months based on the concern for potential prostate cancer.  He is not bothered by the medication but is certainly up for stopping it if it is not necessary.  Thank you,  Carlos        "

## 2024-05-20 NOTE — TELEPHONE ENCOUNTER
LVM, to schedule colonoscopy it needs to be in a hospital setting next available with dr corona, [facility]. Thanks

## 2024-06-03 ENCOUNTER — APPOINTMENT (OUTPATIENT)
Dept: LAB | Facility: MEDICAL CENTER | Age: 74
End: 2024-06-03
Payer: MEDICARE

## 2024-06-03 DIAGNOSIS — I71.20 THORACIC AORTIC ANEURYSM WITHOUT RUPTURE, UNSPECIFIED PART (HCC): ICD-10-CM

## 2024-06-03 DIAGNOSIS — M1A.0720 IDIOPATHIC CHRONIC GOUT OF LEFT ANKLE WITHOUT TOPHUS: ICD-10-CM

## 2024-06-03 DIAGNOSIS — E83.52 HYPERCALCEMIA: ICD-10-CM

## 2024-06-03 DIAGNOSIS — R97.20 ELEVATED PROSTATE SPECIFIC ANTIGEN (PSA): ICD-10-CM

## 2024-06-03 DIAGNOSIS — I10 ESSENTIAL HYPERTENSION: ICD-10-CM

## 2024-06-03 DIAGNOSIS — I71.21 ANEURYSM OF ASCENDING AORTA WITHOUT RUPTURE (HCC): ICD-10-CM

## 2024-06-03 DIAGNOSIS — I50.41 ACUTE COMBINED SYSTOLIC AND DIASTOLIC CONGESTIVE HEART FAILURE (HCC): ICD-10-CM

## 2024-06-03 DIAGNOSIS — E21.3 HYPERPARATHYROIDISM (HCC): ICD-10-CM

## 2024-06-03 DIAGNOSIS — I82.4Y2 ACUTE DEEP VEIN THROMBOSIS (DVT) OF PROXIMAL VEIN OF LEFT LOWER EXTREMITY (HCC): ICD-10-CM

## 2024-06-03 LAB
ALBUMIN SERPL BCP-MCNC: 4.2 G/DL (ref 3.5–5)
ALP SERPL-CCNC: 62 U/L (ref 34–104)
ALT SERPL W P-5'-P-CCNC: 22 U/L (ref 7–52)
ANION GAP SERPL CALCULATED.3IONS-SCNC: 6 MMOL/L (ref 4–13)
AST SERPL W P-5'-P-CCNC: 21 U/L (ref 13–39)
BASOPHILS # BLD AUTO: 0.09 THOUSANDS/ÂΜL (ref 0–0.1)
BASOPHILS NFR BLD AUTO: 1 % (ref 0–1)
BILIRUB SERPL-MCNC: 0.63 MG/DL (ref 0.2–1)
BUN SERPL-MCNC: 38 MG/DL (ref 5–25)
CALCIUM SERPL-MCNC: 10.8 MG/DL (ref 8.4–10.2)
CHLORIDE SERPL-SCNC: 102 MMOL/L (ref 96–108)
CHOLEST SERPL-MCNC: 211 MG/DL
CO2 SERPL-SCNC: 27 MMOL/L (ref 21–32)
CREAT SERPL-MCNC: 1.45 MG/DL (ref 0.6–1.3)
EOSINOPHIL # BLD AUTO: 0.33 THOUSAND/ÂΜL (ref 0–0.61)
EOSINOPHIL NFR BLD AUTO: 4 % (ref 0–6)
ERYTHROCYTE [DISTWIDTH] IN BLOOD BY AUTOMATED COUNT: 12.7 % (ref 11.6–15.1)
GFR SERPL CREATININE-BSD FRML MDRD: 46 ML/MIN/1.73SQ M
GLUCOSE P FAST SERPL-MCNC: 98 MG/DL (ref 65–99)
HCT VFR BLD AUTO: 45.9 % (ref 36.5–49.3)
HDLC SERPL-MCNC: 63 MG/DL
HGB BLD-MCNC: 15.3 G/DL (ref 12–17)
IMM GRANULOCYTES # BLD AUTO: 0.03 THOUSAND/UL (ref 0–0.2)
IMM GRANULOCYTES NFR BLD AUTO: 0 % (ref 0–2)
LDLC SERPL CALC-MCNC: 127 MG/DL (ref 0–100)
LYMPHOCYTES # BLD AUTO: 2.14 THOUSANDS/ÂΜL (ref 0.6–4.47)
LYMPHOCYTES NFR BLD AUTO: 28 % (ref 14–44)
MCH RBC QN AUTO: 32.1 PG (ref 26.8–34.3)
MCHC RBC AUTO-ENTMCNC: 33.3 G/DL (ref 31.4–37.4)
MCV RBC AUTO: 96 FL (ref 82–98)
MONOCYTES # BLD AUTO: 0.79 THOUSAND/ÂΜL (ref 0.17–1.22)
MONOCYTES NFR BLD AUTO: 10 % (ref 4–12)
NEUTROPHILS # BLD AUTO: 4.18 THOUSANDS/ÂΜL (ref 1.85–7.62)
NEUTS SEG NFR BLD AUTO: 57 % (ref 43–75)
NRBC BLD AUTO-RTO: 0 /100 WBCS
PLATELET # BLD AUTO: 170 THOUSANDS/UL (ref 149–390)
PMV BLD AUTO: 10.4 FL (ref 8.9–12.7)
POTASSIUM SERPL-SCNC: 5.1 MMOL/L (ref 3.5–5.3)
PROT SERPL-MCNC: 7 G/DL (ref 6.4–8.4)
PSA SERPL-MCNC: 6.75 NG/ML (ref 0–4)
PTH-INTACT SERPL-MCNC: 58.6 PG/ML (ref 12–88)
RBC # BLD AUTO: 4.77 MILLION/UL (ref 3.88–5.62)
SODIUM SERPL-SCNC: 135 MMOL/L (ref 135–147)
TRIGL SERPL-MCNC: 106 MG/DL
URATE SERPL-MCNC: 6.5 MG/DL (ref 3.5–8.5)
WBC # BLD AUTO: 7.56 THOUSAND/UL (ref 4.31–10.16)

## 2024-06-03 PROCEDURE — 80061 LIPID PANEL: CPT

## 2024-06-03 PROCEDURE — 36415 COLL VENOUS BLD VENIPUNCTURE: CPT

## 2024-06-03 PROCEDURE — 85025 COMPLETE CBC W/AUTO DIFF WBC: CPT

## 2024-06-03 PROCEDURE — 80053 COMPREHEN METABOLIC PANEL: CPT

## 2024-06-03 PROCEDURE — 83970 ASSAY OF PARATHORMONE: CPT

## 2024-06-03 PROCEDURE — 84153 ASSAY OF PSA TOTAL: CPT

## 2024-06-03 PROCEDURE — 84550 ASSAY OF BLOOD/URIC ACID: CPT

## 2024-06-04 ENCOUNTER — TELEPHONE (OUTPATIENT)
Dept: FAMILY MEDICINE CLINIC | Facility: CLINIC | Age: 74
End: 2024-06-04

## 2024-06-04 PROBLEM — N18.32 STAGE 3B CHRONIC KIDNEY DISEASE (HCC): Status: ACTIVE | Noted: 2024-06-04

## 2024-06-04 NOTE — TELEPHONE ENCOUNTER
----- Message from Pascual Burgess MD sent at 6/4/2024  1:26 PM EDT -----  Labs show some mild kidney injury which can be common when we utilize diuretics.  Please repeat labs within a month.  Make sure he hydrates for these labs.  Calcium was also bit elevated which we will monitor with labs in a month.  Please continue follow-up with urology as PSA did bump up a bit.    Staff, please make sure the patient receives this message.

## 2024-06-10 ENCOUNTER — HOSPITAL ENCOUNTER (OUTPATIENT)
Dept: CT IMAGING | Facility: HOSPITAL | Age: 74
Discharge: HOME | End: 2024-06-10
Attending: THORACIC SURGERY (CARDIOTHORACIC VASCULAR SURGERY)
Payer: MEDICARE

## 2024-06-10 DIAGNOSIS — I71.21 ANEURYSM OF ASCENDING AORTA WITHOUT RUPTURE (HCC): ICD-10-CM

## 2024-06-10 PROCEDURE — 71250 CT THORAX DX C-: CPT

## 2024-07-06 DIAGNOSIS — I50.41 ACUTE COMBINED SYSTOLIC AND DIASTOLIC CONGESTIVE HEART FAILURE (HCC): ICD-10-CM

## 2024-07-06 DIAGNOSIS — I10 ESSENTIAL HYPERTENSION: ICD-10-CM

## 2024-07-06 RX ORDER — OLMESARTAN MEDOXOMIL 40 MG/1
40 TABLET ORAL DAILY
Qty: 100 TABLET | Refills: 1 | Status: SHIPPED | OUTPATIENT
Start: 2024-07-06

## 2024-07-06 RX ORDER — FUROSEMIDE 20 MG/1
TABLET ORAL
Qty: 100 TABLET | Refills: 1 | Status: SHIPPED | OUTPATIENT
Start: 2024-07-06

## 2024-07-25 ENCOUNTER — PATIENT MESSAGE (OUTPATIENT)
Dept: FAMILY MEDICINE CLINIC | Facility: CLINIC | Age: 74
End: 2024-07-25

## 2024-07-26 ENCOUNTER — PATIENT MESSAGE (OUTPATIENT)
Dept: FAMILY MEDICINE CLINIC | Facility: CLINIC | Age: 74
End: 2024-07-26

## 2024-07-27 ENCOUNTER — PATIENT MESSAGE (OUTPATIENT)
Dept: FAMILY MEDICINE CLINIC | Facility: CLINIC | Age: 74
End: 2024-07-27

## 2024-07-27 DIAGNOSIS — E66.9 OBESITY (BMI 30.0-34.9): ICD-10-CM

## 2024-08-05 DIAGNOSIS — M1A.09X0 CHRONIC GOUT OF MULTIPLE SITES, UNSPECIFIED CAUSE: ICD-10-CM

## 2024-08-06 RX ORDER — ALLOPURINOL 100 MG/1
TABLET ORAL
Qty: 200 TABLET | Refills: 1 | Status: SHIPPED | OUTPATIENT
Start: 2024-08-06

## 2024-08-10 ENCOUNTER — PATIENT MESSAGE (OUTPATIENT)
Dept: FAMILY MEDICINE CLINIC | Facility: CLINIC | Age: 74
End: 2024-08-10

## 2024-08-10 DIAGNOSIS — R97.20 ELEVATED PSA: Primary | ICD-10-CM

## 2024-08-13 RX ORDER — TIRZEPATIDE 10 MG/.5ML
10 INJECTION, SOLUTION SUBCUTANEOUS WEEKLY
Qty: 2 ML | Refills: 0 | Status: SHIPPED | OUTPATIENT
Start: 2024-08-13 | End: 2024-09-10

## 2024-08-16 ENCOUNTER — APPOINTMENT (OUTPATIENT)
Dept: LAB | Facility: MEDICAL CENTER | Age: 74
End: 2024-08-16
Payer: MEDICARE

## 2024-08-16 DIAGNOSIS — N17.9 AKI (ACUTE KIDNEY INJURY) (HCC): ICD-10-CM

## 2024-08-16 DIAGNOSIS — R97.20 ELEVATED PSA: ICD-10-CM

## 2024-08-16 LAB
ALBUMIN SERPL BCG-MCNC: 4.2 G/DL (ref 3.5–5)
ALP SERPL-CCNC: 73 U/L (ref 34–104)
ALT SERPL W P-5'-P-CCNC: 36 U/L (ref 7–52)
ANION GAP SERPL CALCULATED.3IONS-SCNC: 8 MMOL/L (ref 4–13)
AST SERPL W P-5'-P-CCNC: 28 U/L (ref 13–39)
BILIRUB SERPL-MCNC: 0.59 MG/DL (ref 0.2–1)
BUN SERPL-MCNC: 22 MG/DL (ref 5–25)
CALCIUM ALBUM COR SERPL-MCNC: 9.9 MG/DL (ref 8.3–10.1)
CALCIUM SERPL-MCNC: 10.1 MG/DL (ref 8.4–10.2)
CHLORIDE SERPL-SCNC: 100 MMOL/L (ref 96–108)
CO2 SERPL-SCNC: 28 MMOL/L (ref 21–32)
CREAT SERPL-MCNC: 1.33 MG/DL (ref 0.6–1.3)
GFR SERPL CREATININE-BSD FRML MDRD: 51 ML/MIN/1.73SQ M
GLUCOSE P FAST SERPL-MCNC: 99 MG/DL (ref 65–99)
POTASSIUM SERPL-SCNC: 4.4 MMOL/L (ref 3.5–5.3)
PROT SERPL-MCNC: 7.2 G/DL (ref 6.4–8.4)
PSA SERPL-MCNC: 6.74 NG/ML (ref 0–4)
SODIUM SERPL-SCNC: 136 MMOL/L (ref 135–147)

## 2024-08-16 PROCEDURE — 36415 COLL VENOUS BLD VENIPUNCTURE: CPT

## 2024-08-16 PROCEDURE — 84153 ASSAY OF PSA TOTAL: CPT

## 2024-08-16 PROCEDURE — 80053 COMPREHEN METABOLIC PANEL: CPT

## 2024-08-30 ENCOUNTER — OFFICE VISIT (OUTPATIENT)
Dept: UROLOGY | Facility: CLINIC | Age: 74
End: 2024-08-30
Payer: MEDICARE

## 2024-08-30 ENCOUNTER — OFFICE VISIT (OUTPATIENT)
Dept: FAMILY MEDICINE CLINIC | Facility: CLINIC | Age: 74
End: 2024-08-30
Payer: MEDICARE

## 2024-08-30 VITALS
HEART RATE: 66 BPM | WEIGHT: 248 LBS | BODY MASS INDEX: 30.84 KG/M2 | HEIGHT: 75 IN | DIASTOLIC BLOOD PRESSURE: 70 MMHG | OXYGEN SATURATION: 92 % | SYSTOLIC BLOOD PRESSURE: 120 MMHG

## 2024-08-30 VITALS
WEIGHT: 248 LBS | BODY MASS INDEX: 30.84 KG/M2 | OXYGEN SATURATION: 97 % | HEIGHT: 75 IN | TEMPERATURE: 97.6 F | HEART RATE: 71 BPM | DIASTOLIC BLOOD PRESSURE: 90 MMHG | SYSTOLIC BLOOD PRESSURE: 130 MMHG

## 2024-08-30 DIAGNOSIS — I10 ESSENTIAL HYPERTENSION: ICD-10-CM

## 2024-08-30 DIAGNOSIS — R79.81 LOW O2 SATURATION: ICD-10-CM

## 2024-08-30 DIAGNOSIS — R97.20 ELEVATED PROSTATE SPECIFIC ANTIGEN (PSA): Primary | ICD-10-CM

## 2024-08-30 DIAGNOSIS — N18.32 STAGE 3B CHRONIC KIDNEY DISEASE (HCC): ICD-10-CM

## 2024-08-30 DIAGNOSIS — I82.4Y2 ACUTE DEEP VEIN THROMBOSIS (DVT) OF PROXIMAL VEIN OF LEFT LOWER EXTREMITY (HCC): ICD-10-CM

## 2024-08-30 DIAGNOSIS — R22.42 LOCALIZED SWELLING OF LEFT LOWER EXTREMITY: Primary | ICD-10-CM

## 2024-08-30 PROCEDURE — G2211 COMPLEX E/M VISIT ADD ON: HCPCS | Performed by: INTERNAL MEDICINE

## 2024-08-30 PROCEDURE — 99214 OFFICE O/P EST MOD 30 MIN: CPT | Performed by: INTERNAL MEDICINE

## 2024-08-30 PROCEDURE — 99213 OFFICE O/P EST LOW 20 MIN: CPT

## 2024-08-30 NOTE — ASSESSMENT & PLAN NOTE
He had 1 reading of pulse ox of 92 during his appointment with urology team.  It was rechecked by us today and his saturation went back to his baseline which is close to 95.  Patient does not report any active symptoms.  He will continue to monitor at home.

## 2024-08-30 NOTE — ASSESSMENT & PLAN NOTE
Patient has history of acute DVT, he used to be on Xarelto which was stopped, he is currently continuing his daily aspirin.  He noticed that recently his left lower extremity which was affected by DVT got more swollen.  Will send him for repeat Doppler of his left lower extremity to see progression of his DVT.  Further management after results of his test.

## 2024-08-30 NOTE — PROGRESS NOTES
Office Visit- Urology  Erlin Hawkins 1950 MRN: [id number]      Assessment/Discussion/Plan    73 y.o. male managed by     Elevated PSA  -Patient has had an elevated PSA since 2020  -Has ranged between 3.5-8.26  -Most recent PSA returned at 6.7 in August 2024  -Met with Dr. Whitman in March 2024 for a initial consultation.  Had the Prosta examination with no significant abnormality palpated  -Patient not inclined for prostate biopsy  -He had a multiparametric MRI of the prostate in March 2024 that returned with a PI-RADS 3 lesion  -Patient decided not to proceed with biopsy and continue with observation with continual trend of PSA.  PSA has been in the 6 range where typical baseline is between 3-5 with the 1 outlier of 8 in February 2024  -Had long conversation with patient about his PSA level and that there is a risk that prostate cancer could be present.  Patient states he wants to everything he can to delay biopsy.  He is aware of risk of progression of prostate cancer.  -Through shared decision making we will plan to obtain her updated total and free PSA in 3 months with follow-up at that point in time    2.  Low oxygen sat/unilateral leg swelling  -Patient with previous history of DVT  -Not currently on anticoagulation  -SBP was ranged between 92-93 her previous baseline was between 94-96% patient with no history of previous COPD .   He denies any symptoms such as difficulty breathing, shortness of breath, chest pain but states that he is not having left leg swelling for the past couple weeks and this has not been evaluated or brought to attention of PCP  -I counseled patient that I am concerned about a potential recurrent DVT.  I am also concerned that his oxygen saturation is lower than his previous documented baseline. I counseled that I think that he should proceed directly to the ER.  Patient refused that consideration.  Patient sees PCP next-door.  He was agreeable to see provider in PCP office  for further evaluation. there was an opening so that patient left directly from the urology office to family physician office in same building            Chief Complaint:   Erlin is a 73 y.o. male presenting to the office for a follow up visit regarding elevated PSA        Subjective    Patient is a 73-year-old male who presents for follow-up in regards to elevated PSA.  PSA has been elevated since at least 2020 and PSA max was 8.26.  He saw Dr. Whitman in March 2024 had prostate exam with no significant abnormality.  No known family history of prostate cancer.  He had a multiparametric MR the prostate that returned with a PI-RADS 3 lesion.  He is very hesitant to proceed with prostate biopsy.  He ultimately decided for continued observation and returns to the office today for further discussion.  His most recent PSA returned at 6.7      ROS:   Review of Systems   Constitutional: Negative.  Negative for chills, fatigue and fever.   HENT: Negative.     Respiratory:  Negative for shortness of breath.    Cardiovascular:  Negative for chest pain.   Gastrointestinal: Negative.  Negative for abdominal pain.   Endocrine: Negative.    Musculoskeletal: Negative.    Skin: Negative.    Neurological: Negative.  Negative for dizziness and light-headedness.   Hematological: Negative.    Psychiatric/Behavioral: Negative.           Past Medical History  Past Medical History:   Diagnosis Date    Abnormal chest CT     RESOLVED: 15JUN2016    Balance disorder     RESOLVED: 15JUN2016    Colon polyp     COVID-19 01/12/2022    Positive home test 1/10/22    Elevated brain natriuretic peptide (BNP) level 05/12/2023    Elevated d-dimer 05/12/2023    Elevated PSA     Lumbar radiculopathy     RESOLVED: 15JUN2016    Lumbar spondylosis     RESOLVED: 15JUN2016    Melanoma (HCC)        Past Surgical History  Past Surgical History:   Procedure Laterality Date    CHOLECYSTECTOMY      COLONOSCOPY  02/08/2022    SC RPR/ADVMNT FLXR TDN N/Z/2 W/O  FR GRAFT EA TENDON Right 08/27/2019    Procedure: REPAIR FLEXOR TENDON right small FINGER;  Surgeon: Renaldo Bravo MD;  Location: [facility];  Service: Orthopedics    SKIN CANCER EXCISION      TONSILLECTOMY      TOTAL HIP ARTHROPLASTY Left 06/2016       Past Family History  Family History   Problem Relation Age of Onset    Cancer Father     Arthritis Family     Colon polyps Family        Past Social history  Social History     Socioeconomic History    Marital status: /Civil Union     Spouse name: Not on file    Number of children: Not on file    Years of education: Not on file    Highest education level: Not on file   Occupational History    Not on file   Tobacco Use    Smoking status: Former     Current packs/day: 0.25     Average packs/day: 0.3 packs/day for 3.9 years (1.0 ttl pk-yrs)     Types: Cigarettes     Start date: 10/1/2020    Smokeless tobacco: Never   Vaping Use    Vaping status: Never Used   Substance and Sexual Activity    Alcohol use: Yes     Comment: DAILY, liquor    Drug use: Never    Sexual activity: Not on file   Other Topics Concern    Not on file   Social History Narrative    Not on file     Social Determinants of Health     Financial Resource Strain: Not on file   Food Insecurity: No Food Insecurity (5/13/2024)    Hunger Vital Sign     Worried About Running Out of Food in the Last Year: Never true     Ran Out of Food in the Last Year: Never true   Transportation Needs: No Transportation Needs (5/13/2024)    PRAPARE - Transportation     Lack of Transportation (Medical): No     Lack of Transportation (Non-Medical): No   Physical Activity: Not on file   Stress: Not on file   Social Connections: Not on file   Intimate Partner Violence: Not on file   Housing Stability: Low Risk  (5/13/2024)    Housing Stability Vital Sign     Unable to Pay for Housing in the Last Year: No     Number of Times Moved in the Last Year: 1     Homeless in the Last Year: No       Current Medications  Current  Outpatient Medications   Medication Sig Dispense Refill    allopurinol (ZYLOPRIM) 100 mg tablet TAKE 2 TABLETS(200 MG) BY MOUTH DAILY 200 tablet 1    furosemide (LASIX) 20 mg tablet TAKE 1 TABLET BY MOUTH DAILY AS NEEDED FOR LOWER EXTREMITY SWELLING. TAKE POTASSIUM SUPLEMENT IF TAKING 100 tablet 1    scopolamine (TRANSDERM-SCOP) 1 mg/3 days TD 72 hr patch Place 1 patch on the skin over 72 hours every third day 10 patch 5    Zepbound 10 MG/0.5ML auto-injector Inject 0.5 mL (10 mg total) under the skin once a week for 28 days 2 mL 0    [START ON 9/2/2024] Zepbound 12.5 MG/0.5ML auto-injector Inject 0.5 mL (12.5 mg total) under the skin once a week for 28 days Do not start before September 2, 2024. 2 mL 0    [START ON 9/30/2024] Zepbound 15 MG/0.5ML auto-injector Inject 0.5 mL (15 mg total) under the skin once a week for 28 days Do not start before September 30, 2024. 2 mL 0    Ascorbic Acid (VITAMIN C) 1000 MG tablet Take 1 tablet by mouth daily (Patient not taking: Reported on 8/30/2024)      Cholecalciferol (Vitamin D3) 50 MCG (2000 UT) capsule Take 1 capsule (2,000 Units total) by mouth daily (Patient not taking: Reported on 8/30/2024)  0    EPINEPHrine (EPIPEN) 0.3 mg/0.3 mL SOAJ Inject 0.3 mL (0.3 mg total) into a muscle once for 1 dose As needed for allergic reaction (Patient not taking: Reported on 1/10/2024) 0.3 mL 0    olmesartan (BENICAR) 40 mg tablet TAKE 1 TABLET(40 MG) BY MOUTH DAILY (Patient not taking: Reported on 8/30/2024) 100 tablet 1    rivaroxaban (Xarelto) 20 mg tablet Take 1 tablet (20 mg total) by mouth daily with breakfast (Patient not taking: Reported on 8/30/2024) 30 tablet 4     No current facility-administered medications for this visit.       Allergies  Allergies   Allergen Reactions    Atorvastatin Myalgia    Honey Bee Venom Swelling    Rosuvastatin Myalgia       OBJECTIVE    Vitals   Vitals:    08/30/24 0921   BP: 120/70   BP Location: Left arm   Patient Position: Sitting   Cuff Size:  "Large   Pulse: 66   SpO2: 92%   Weight: 112 kg (248 lb)   Height: 6' 3\" (1.905 m)       PVR:    Physical Exam  Constitutional:       General: He is not in acute distress.     Appearance: Normal appearance. He is normal weight. He is not ill-appearing or toxic-appearing.   Eyes:      Conjunctiva/sclera: Conjunctivae normal.   Pulmonary:      Effort: Pulmonary effort is normal. No respiratory distress.   Musculoskeletal:      Left lower leg: Edema present.   Neurological:      General: No focal deficit present.      Mental Status: He is alert and oriented to person, place, and time.      Cranial Nerves: No cranial nerve deficit.   Psychiatric:         Mood and Affect: Mood normal.         Behavior: Behavior normal.         Thought Content: Thought content normal.          Labs:     Lab Results   Component Value Date    PSA 6.738 (H) 08/16/2024    PSA 6.75 (H) 06/03/2024    PSA 5.12 (H) 04/02/2024    PSA 8.26 (H) 02/28/2024     Lab Results   Component Value Date    CREATININE 1.33 (H) 08/16/2024      No results found for: \"HGBA1C\"  Lab Results   Component Value Date    GLUCOSE 95 10/08/2018    CALCIUM 10.1 08/16/2024     12/18/2015    K 4.4 08/16/2024    CO2 28 08/16/2024     08/16/2024    BUN 22 08/16/2024    CREATININE 1.33 (H) 08/16/2024       I have personally reviewed all pertinent lab results and reviewed with patient    Imaging   MULTIPARAMETRIC MRI OF THE PROSTATE WITH AND WITHOUT CONTRAST-WITH 3-D POSTPROCESSING     INDICATION: R97.20: Elevated prostate specific antigen (PSA).     COMPARISON: None.     PSA LEVEL: 8.26 ng/mL on February 28, 2024.  PRIOR BIOPSY DATE: No prior biopsy.  BIOPSY RESULTS: Not applicable.     TECHNIQUE: The following pulse sequences were obtained: Small field-of-view axial T1-weighted and multiplanar T2-weighted images; DWI axial and ADC map; large field of view axial T2 weighted images; T1w in-phase and opposed-phase axials of entire pelvis   and dynamic 3D T1w axial " before and during IV contrast injection.     CONTRAST: Gadobutrol (Gadavist) 11 mL of Gadobutrol injection (SINGLE-DOSE)     TECHNICAL LIMITATIONS: Susceptibility artifact from left hip arthroplasty degrades the diffusion-weighted sequences.     FINDINGS:     PROSTATE:  Size: 5.4 x 4.5 x 4.5 cm = 48.1 cc.  Post-biopsy hemorrhage: None.  Central gland enlargement (BPH): Mild.     Focal lesions - localization as follows:     Lesion: 1  Size: 1.3 x 0.8 cm, series 4, image 18.  Location: Posterior left peripheral zone at base  T2-weighted images: Score 3: Heterogeneous signal or noncircumscribed, rounded, moderate hypointensity; includes others that do not qualify as 2, 4 or 5.  Diffusion-weighted images: Unable to interpret due to susceptibility artifact  Dynamic post-contrast images: (-) Lesion that does not enhance early compared to the surrounding prostate or enhances diffusely so that the margins of the enhancing area do not correspond to a finding on T2-weighted images and/or DWI.  PI-RADS Assessment Category: 3, Intermediate (presence of clinically significant cancer equivocal).  Extra-prostatic extension (EPE): Abuts capsule without visualized EPE.     SEMINAL VESICLES: Unremarkable     Note: Clinically significant cancer is defined on pathology/histology as Zunilda score greater than or equal to 7, and/or volume of greater than or equal to 0.5 mL, and/or extraprostatic extension.     URINARY BLADDER: Unremarkable.     LYMPH NODES: No pelvic lymphadenopathy.     BONES: No suspicious osseous lesion.           IMPRESSION:  Susceptibility artifact from left hip arthroplasty degrades the diffusion-weighted sequences.     1. PI-RADSv2.1 Category 3 - Intermediate (the presence of clinically significant cancer is equivocal). Possible lesion in the posterior left peripheral zone at base     2. No extraprostatic tumor, seminal vesicle invasion, pelvic lymphadenopathy, or pelvic osseous metastatic disease.     3.  Calculated prostate volume of 48 cc.     Prostate gland boundaries and areas of concern for significant prostate cancer were segmented using 3D advanced post-processing on an independent [employer] system workstation with active physician participation. The segmentation was performed should   MR-ultrasound fusion biopsy be required.     The study was marked in EPIC for significant notification.     Workstation performed: [id number]       Asad Cordero PA-C  Date: 8/30/2024 Time: 9:26 AM  [facility]    This note was written using fluency dictation software. Please excuse any resulting minor grammatical errors.

## 2024-08-30 NOTE — PROGRESS NOTES
Assessment/Plan:    Acute deep vein thrombosis (DVT) of proximal vein of left lower extremity (HCC)  Patient has history of acute DVT, he used to be on Xarelto which was stopped, he is currently continuing his daily aspirin.  He noticed that recently his left lower extremity which was affected by DVT got more swollen.  Will send him for repeat Doppler of his left lower extremity to see progression of his DVT.  Further management after results of his test.    Essential hypertension  He could not tolerate olmesartan due to side effects, he stopped taking the medication.  He will continue to monitor blood pressures at home, he will follow-up with us in 2 weeks.    Stage 3b chronic kidney disease (HCC)  Lab Results   Component Value Date    EGFR 51 08/16/2024    EGFR 46 06/03/2024    EGFR 53 04/02/2024    CREATININE 1.33 (H) 08/16/2024    CREATININE 1.45 (H) 06/03/2024    CREATININE 1.29 04/02/2024   His GFR was running below his baseline recently, will recheck in 2 weeks.    Low O2 saturation  He had 1 reading of pulse ox of 92 during his appointment with urology team.  It was rechecked by us today and his saturation went back to his baseline which is close to 95.  Patient does not report any active symptoms.  He will continue to monitor at home.       Diagnoses and all orders for this visit:    Localized swelling of left lower extremity  -     VAS VENOUS DUPLEX -LOWER LIMB UNILATERAL; Future    Acute deep vein thrombosis (DVT) of proximal vein of left lower extremity (HCC)    Essential hypertension  -     Basic metabolic panel; Future    Stage 3b chronic kidney disease (HCC)    Low O2 saturation          Subjective:      Patient ID: Erlin Hawkins is a 73 y.o. male.    Patient came today for follow-up on his chronic medical problems and with a concern of low saturation that he had during the visit with urology team today.        The following portions of the patient's history were reviewed and updated as appropriate:  "allergies, current medications, past family history, past medical history, past social history, past surgical history, and problem list.    Review of Systems   Constitutional:  Negative for chills and fever.   Respiratory:  Negative for cough, shortness of breath and wheezing.    Cardiovascular:  Positive for leg swelling. Negative for chest pain and palpitations.         Objective:      /90 (BP Location: Left arm, Cuff Size: Large)   Pulse 71   Temp 97.6 °F (36.4 °C) (Tympanic)   Ht 6' 3\" (1.905 m)   Wt 112 kg (248 lb)   SpO2 97%   BMI 31.00 kg/m²     Allergies   Allergen Reactions    Atorvastatin Myalgia    Honey Bee Venom Swelling    Rosuvastatin Myalgia          Current Outpatient Medications:     allopurinol (ZYLOPRIM) 100 mg tablet, TAKE 2 TABLETS(200 MG) BY MOUTH DAILY, Disp: 200 tablet, Rfl: 1    furosemide (LASIX) 20 mg tablet, TAKE 1 TABLET BY MOUTH DAILY AS NEEDED FOR LOWER EXTREMITY SWELLING. TAKE POTASSIUM SUPLEMENT IF TAKING, Disp: 100 tablet, Rfl: 1    scopolamine (TRANSDERM-SCOP) 1 mg/3 days TD 72 hr patch, Place 1 patch on the skin over 72 hours every third day, Disp: 10 patch, Rfl: 5    Zepbound 10 MG/0.5ML auto-injector, Inject 0.5 mL (10 mg total) under the skin once a week for 28 days, Disp: 2 mL, Rfl: 0    [START ON 9/2/2024] Zepbound 12.5 MG/0.5ML auto-injector, Inject 0.5 mL (12.5 mg total) under the skin once a week for 28 days Do not start before September 2, 2024., Disp: 2 mL, Rfl: 0    [START ON 9/30/2024] Zepbound 15 MG/0.5ML auto-injector, Inject 0.5 mL (15 mg total) under the skin once a week for 28 days Do not start before September 30, 2024., Disp: 2 mL, Rfl: 0     Patient Instructions   Please hold your olmesartan, check your blood pressure at home twice a day first time in the morning second time during the day at your convenience.  Write down all your readings on a piece of paper as a chart bring it to me in 2 weeks with your blood pressure cuff.  Please do your blood " work before next visit in 2 weeks.              Physical Exam  Vitals reviewed.   Cardiovascular:      Heart sounds: No murmur heard.     No gallop.   Pulmonary:      Effort: No respiratory distress.      Breath sounds: No wheezing or rales.   Musculoskeletal:      Right lower leg: No edema.      Left lower leg: Edema present.

## 2024-08-30 NOTE — PATIENT INSTRUCTIONS
Please hold your olmesartan, check your blood pressure at home twice a day first time in the morning second time during the day at your convenience.  Write down all your readings on a piece of paper as a chart bring it to me in 2 weeks with your blood pressure cuff.  Please do your blood work before next visit in 2 weeks.

## 2024-08-30 NOTE — ASSESSMENT & PLAN NOTE
Lab Results   Component Value Date    EGFR 51 08/16/2024    EGFR 46 06/03/2024    EGFR 53 04/02/2024    CREATININE 1.33 (H) 08/16/2024    CREATININE 1.45 (H) 06/03/2024    CREATININE 1.29 04/02/2024   His GFR was running below his baseline recently, will recheck in 2 weeks.

## 2024-08-30 NOTE — ASSESSMENT & PLAN NOTE
He could not tolerate olmesartan due to side effects, he stopped taking the medication.  He will continue to monitor blood pressures at home, he will follow-up with us in 2 weeks.

## 2024-09-05 ENCOUNTER — HOSPITAL ENCOUNTER (OUTPATIENT)
Dept: NON INVASIVE DIAGNOSTICS | Facility: CLINIC | Age: 74
Discharge: HOME | End: 2024-09-05
Payer: MEDICARE

## 2024-09-05 DIAGNOSIS — R22.42 LOCALIZED SWELLING OF LEFT LOWER EXTREMITY: ICD-10-CM

## 2024-09-05 PROCEDURE — 93971 EXTREMITY STUDY: CPT

## 2024-09-05 PROCEDURE — 93971 EXTREMITY STUDY: CPT | Performed by: SURGERY

## 2024-09-06 ENCOUNTER — APPOINTMENT (OUTPATIENT)
Dept: LAB | Facility: MEDICAL CENTER | Age: 74
End: 2024-09-06
Payer: MEDICARE

## 2024-09-06 DIAGNOSIS — I10 ESSENTIAL HYPERTENSION: ICD-10-CM

## 2024-09-06 LAB
ANION GAP SERPL CALCULATED.3IONS-SCNC: 6 MMOL/L (ref 4–13)
BUN SERPL-MCNC: 23 MG/DL (ref 5–25)
CALCIUM SERPL-MCNC: 9.9 MG/DL (ref 8.4–10.2)
CHLORIDE SERPL-SCNC: 102 MMOL/L (ref 96–108)
CO2 SERPL-SCNC: 29 MMOL/L (ref 21–32)
CREAT SERPL-MCNC: 1.18 MG/DL (ref 0.6–1.3)
GFR SERPL CREATININE-BSD FRML MDRD: 60 ML/MIN/1.73SQ M
GLUCOSE P FAST SERPL-MCNC: 102 MG/DL (ref 65–99)
POTASSIUM SERPL-SCNC: 4.7 MMOL/L (ref 3.5–5.3)
SODIUM SERPL-SCNC: 137 MMOL/L (ref 135–147)

## 2024-09-06 PROCEDURE — 36415 COLL VENOUS BLD VENIPUNCTURE: CPT

## 2024-09-06 PROCEDURE — 80048 BASIC METABOLIC PNL TOTAL CA: CPT

## 2024-09-10 ENCOUNTER — OFFICE VISIT (OUTPATIENT)
Dept: FAMILY MEDICINE CLINIC | Facility: CLINIC | Age: 74
End: 2024-09-10
Payer: MEDICARE

## 2024-09-10 VITALS
DIASTOLIC BLOOD PRESSURE: 72 MMHG | WEIGHT: 245.6 LBS | BODY MASS INDEX: 30.54 KG/M2 | OXYGEN SATURATION: 95 % | HEART RATE: 70 BPM | HEIGHT: 75 IN | TEMPERATURE: 97.5 F | RESPIRATION RATE: 16 BRPM | SYSTOLIC BLOOD PRESSURE: 124 MMHG

## 2024-09-10 DIAGNOSIS — I71.21 ANEURYSM OF ASCENDING AORTA WITHOUT RUPTURE (HCC): ICD-10-CM

## 2024-09-10 DIAGNOSIS — I10 ESSENTIAL HYPERTENSION: Primary | ICD-10-CM

## 2024-09-10 DIAGNOSIS — N18.32 STAGE 3B CHRONIC KIDNEY DISEASE (HCC): ICD-10-CM

## 2024-09-10 DIAGNOSIS — I82.5Y2 CHRONIC DEEP VEIN THROMBOSIS (DVT) OF PROXIMAL VEIN OF LEFT LOWER EXTREMITY (HCC): ICD-10-CM

## 2024-09-10 PROBLEM — I82.4Y2 ACUTE DEEP VEIN THROMBOSIS (DVT) OF PROXIMAL VEIN OF LEFT LOWER EXTREMITY (HCC): Status: RESOLVED | Noted: 2024-01-10 | Resolved: 2024-09-10

## 2024-09-10 PROCEDURE — G2211 COMPLEX E/M VISIT ADD ON: HCPCS | Performed by: INTERNAL MEDICINE

## 2024-09-10 PROCEDURE — 99214 OFFICE O/P EST MOD 30 MIN: CPT | Performed by: INTERNAL MEDICINE

## 2024-09-10 NOTE — ASSESSMENT & PLAN NOTE
Blood pressure is very well-controlled right now being on low-salt diet.  He checked his blood pressures at home and it was very acceptable.  He could not tolerate olmesartan so it was discontinued.  He will continue to monitor at home.

## 2024-09-10 NOTE — ASSESSMENT & PLAN NOTE
Recheck of his lower extremity ultrasound revealed chronic DVT.  No signs of acute DVT.  Continue with compression stockings.

## 2024-09-10 NOTE — PROGRESS NOTES
"Assessment/Plan:    Aneurysm of ascending aorta (HCC)  Stable as per last CT scan.    Chronic deep vein thrombosis (DVT) of proximal vein of left lower extremity (HCC)  Recheck of his lower extremity ultrasound revealed chronic DVT.  No signs of acute DVT.  Continue with compression stockings.    Essential hypertension  Blood pressure is very well-controlled right now being on low-salt diet.  He checked his blood pressures at home and it was very acceptable.  He could not tolerate olmesartan so it was discontinued.  He will continue to monitor at home.    Stage 3b chronic kidney disease (HCC)  Lab Results   Component Value Date    EGFR 60 09/06/2024    EGFR 51 08/16/2024    EGFR 46 06/03/2024    CREATININE 1.18 09/06/2024    CREATININE 1.33 (H) 08/16/2024    CREATININE 1.45 (H) 06/03/2024   His creatinine went back to his baseline.       Diagnoses and all orders for this visit:    Essential hypertension    Aneurysm of ascending aorta without rupture (HCC)    Stage 3b chronic kidney disease (HCC)    Chronic deep vein thrombosis (DVT) of proximal vein of left lower extremity (HCC)          Subjective:      Patient ID: Erlin Hawkins is a 73 y.o. male.    Patient came today for follow-up on his multiple chronic medical problems.        The following portions of the patient's history were reviewed and updated as appropriate: allergies, current medications, past family history, past medical history, past social history, past surgical history, and problem list.    Review of Systems   Constitutional:  Negative for chills and fever.   Respiratory:  Negative for cough, shortness of breath and wheezing.    Cardiovascular:  Positive for leg swelling. Negative for chest pain and palpitations.         Objective:      /72 (BP Location: Left arm, Patient Position: Sitting, Cuff Size: Large)   Pulse 70   Temp 97.5 °F (36.4 °C) (Temporal)   Resp 16   Ht 6' 3\" (1.905 m)   Wt 111 kg (245 lb 9.6 oz)   SpO2 95%   BMI 30.70 " kg/m²     Allergies   Allergen Reactions    Atorvastatin Myalgia    Honey Bee Venom Swelling    Rosuvastatin Myalgia          Current Outpatient Medications:     allopurinol (ZYLOPRIM) 100 mg tablet, TAKE 2 TABLETS(200 MG) BY MOUTH DAILY, Disp: 200 tablet, Rfl: 1    furosemide (LASIX) 20 mg tablet, TAKE 1 TABLET BY MOUTH DAILY AS NEEDED FOR LOWER EXTREMITY SWELLING. TAKE POTASSIUM SUPLEMENT IF TAKING, Disp: 100 tablet, Rfl: 1    scopolamine (TRANSDERM-SCOP) 1 mg/3 days TD 72 hr patch, Place 1 patch on the skin over 72 hours every third day, Disp: 10 patch, Rfl: 5    Zepbound 10 MG/0.5ML auto-injector, Inject 0.5 mL (10 mg total) under the skin once a week for 28 days (Patient not taking: Reported on 9/10/2024), Disp: 2 mL, Rfl: 0    Zepbound 12.5 MG/0.5ML auto-injector, Inject 0.5 mL (12.5 mg total) under the skin once a week for 28 days Do not start before September 2, 2024. (Patient not taking: Reported on 9/10/2024), Disp: 2 mL, Rfl: 0    [START ON 9/30/2024] Zepbound 15 MG/0.5ML auto-injector, Inject 0.5 mL (15 mg total) under the skin once a week for 28 days Do not start before September 30, 2024. (Patient not taking: Reported on 9/10/2024 Do not start before September 30, 2024.), Disp: 2 mL, Rfl: 0     Patient Instructions   Please buy [employer] upper arm blood pressure cuff.  Next time bring it with you for the appointment when you will see Dr. Valencia or myself.    Before you will see Dr. Valencia in November please check your blood pressure at home twice a day as you did write down your numbers and bring it with you with your cuff.        Physical Exam  Vitals reviewed.   Cardiovascular:      Heart sounds: No murmur heard.     No gallop.   Pulmonary:      Effort: No respiratory distress.      Breath sounds: No wheezing or rales.   Musculoskeletal:      Right lower leg: No edema.      Left lower leg: Edema (Baseline) present.

## 2024-09-10 NOTE — PATIENT INSTRUCTIONS
Please buy [employer] upper arm blood pressure cuff.  Next time bring it with you for the appointment when you will see Dr. Valencia or myself.    Before you will see Dr. Valencia in November please check your blood pressure at home twice a day as you did write down your numbers and bring it with you with your cuff.

## 2024-09-10 NOTE — ASSESSMENT & PLAN NOTE
Lab Results   Component Value Date    EGFR 60 09/06/2024    EGFR 51 08/16/2024    EGFR 46 06/03/2024    CREATININE 1.18 09/06/2024    CREATININE 1.33 (H) 08/16/2024    CREATININE 1.45 (H) 06/03/2024   His creatinine went back to his baseline.

## 2024-10-09 ENCOUNTER — TELEPHONE (OUTPATIENT)
Dept: UROLOGY | Facility: CLINIC | Age: 74
End: 2024-10-09

## 2024-10-09 NOTE — TELEPHONE ENCOUNTER
Spoke to patient requesting he get his PSA done prior to 10/23 appt with Asad.  Patient understood.

## 2024-10-21 NOTE — PROGRESS NOTES
[facility]  Daniel Harvey 66 y o  male MRN: [id number]    Physician Requesting Consult: Wilmar Beasley MD    Reason for Consult / Principal Problem: Ascending aortic aneurysm    History of Present Illness: Daniel Harvey is a 66y o  year old male who presents today to aortic clinic for consultation regarding his ascending aortic aneurysm  This was initially identified at least as far back as 2014 and measured 44 mm at that time  He has multiple chest CT scans demonstrating ascending aortic dilatation that is stable  Patient's PMHx is notable for HTN, HLD, skin melanoma, OA, lumbar radiculopathy, gout and colon polyps  His BP is reasonably controlled on Olmesartan  He is on Pravastatin for lipid management  He is a former smoker, quit in 2020  He denies a FH of aneurysm or premature SCD  Upon interview patient denies exertional chest pain, SOB, lightheadedness, palpitations, new or unusual back pain, abdominal pain or claudication  He has a splint on his left forearm  He states he sustained a fracture when he slipped and feel while slipping (deneis syncope) which required  ORIF  He states he recently experienced a significant sneezing episode and developed a sharp pain left lateral rib cage       Patient is 6'4" 258 lb    Past Medical History:  Past Medical History:   Diagnosis Date    Abnormal chest CT     RESOLVED: [id number]    Balance disorder     RESOLVED: [id number]    Colon polyp     Lumbar radiculopathy     RESOLVED: [id number]    Lumbar spondylosis     RESOLVED: [id number]    Melanoma ([facility] [address] )          Past Surgical History:   Past Surgical History:   Procedure Laterality Date    CHOLECYSTECTOMY      COLONOSCOPY  02/08/2022    IL REPAIR FLEXOR TENDON,HAND,W/O GRAFT,EA Right 08/27/2019    Procedure: REPAIR FLEXOR TENDON right small FINGER;  Surgeon: Lucia Neely MD;  Location: [facility] OR;  Service: Orthopedics    TONSILLECTOMY      TOTAL HIP ARTHROPLASTY Left 06/2016         Family History:  Family History   Problem Relation Age of Onset    Hypertension Father     Arthritis Family     Colon polyps Family          Social History:    Social History     Substance and Sexual Activity   Alcohol Use Yes    Comment: DAILY, liquor     Social History     Substance and Sexual Activity   Drug Use Not Currently     Social History     Tobacco Use   Smoking Status Former Smoker    Packs/day: 0 25    Start date: 10/1/2020   Smokeless Tobacco Never Used         Home Medications:   Prior to Admission medications    Medication Sig Start Date End Date Taking? Authorizing Provider   allopurinol (ZYLOPRIM) 100 mg tablet Take 2 tablets (200 mg total) by mouth daily 4/19/22  Yes Christina Catalan MD   Ascorbic Acid (VITAMIN C) 1000 MG tablet Take 1 tablet by mouth daily 11/20/17  Yes Historical Provider, MD   cholecalciferol (VITAMIN D3) 1,000 units tablet Take 1,000 Units by mouth daily   Yes Historical Provider, MD   methylPREDNISolone 4 MG tablet therapy pack Take as directed 11/18/21  Yes Historical Provider, MD   olmesartan (BENICAR) 20 mg tablet Take 1 tablet (20 mg total) by mouth daily 12/13/21  Yes Christina Catalan MD   pravastatin (PRAVACHOL) 10 mg tablet Take 1 tablet (10 mg total) by mouth daily at bedtime 12/13/21  Yes Christina Catalan MD   budesonide (Pulmicort Flexhaler) 180 MCG/ACT inhaler Inhale 4 puffs 2 (two) times a day for 14 days Rinse mouth after use  Patient not taking: Reported on 6/6/2022 1/12/22 1/26/22  Christina Catalan MD   EPINEPHrine (EPIPEN) 0 3 mg/0 3 mL SOAJ Inject 0 3 mL (0 3 mg total) into a muscle once for 1 dose As needed for allergic reaction 10/9/18 10/25/19  Parviz Gross PA-C   scopolamine (TRANSDERM-SCOP) 1 5 mg/3 days TD 72 hr patch Place 1 patch on the skin every third day  Patient not taking: Reported on 6/6/2022 2/27/20   Christina Catalan MD       Allergies:   Allergies   Allergen Reactions    Atorvastatin Myalgia    Rosuvastatin Myalgia  Venomil Honey Bee Venom [Honey Bee Venom] Swelling       Review of Systems:   Review of Systems - History obtained from chart review and the patient  General ROS: negative  Psychological ROS: negative  Ophthalmic ROS: positive for - uses glasses  ENT ROS: negative  Allergy and Immunology ROS: negative  Hematological and Lymphatic ROS: negative  Endocrine ROS: negative  Breast ROS: negative  Respiratory ROS: no cough, shortness of breath, or wheezing  Cardiovascular ROS: no chest pain or dyspnea on exertion  Gastrointestinal ROS: no abdominal pain, change in bowel habits, or black or bloody stools  Genito-Urinary ROS: no dysuria, trouble voiding, or hematuria  Musculoskeletal ROS: positive for - recent left wrist fracture- wearing splint (S/P ORIF); arthralgias  negative for - muscular weakness  Neurological ROS: no TIA or stroke symptoms  Dermatological ROS: negative    Vital Signs:   Vitals:    06/06/22 1024   BP: 138/79   BP Location: Left arm   Patient Position: Sitting   Cuff Size: Standard   Pulse: 80   Resp: 18   SpO2: 93%   Weight: 117 kg (258 lb)   Height: 6' 4" (1 93 m)       Physical Exam:  General: Alert, oriented, large stature, no acute distress  HEENT/NECK:  PERRLA  No jugular venous distention  Cardiac:Regular rate and rhythm, no murmurs rubs or gallops  Carotid arteries: 2+ pulses, no bruits  Pulmonary:  Breath sounds clear bilaterally  Abdomen:  Non-tender, Non-distended  Positive bowel sounds  Upper extremities: Right radial pulse 2+; left forearm splinted; brick capillary refill  Lower extremities: Extremities warm/dry  PT/DP pulses 2+ bilaterally  No edema B/L  Neuro: Alert and oriented X 3  Sensation is grossly intact  No focal deficits  Musculoskeletal: MAEE, stable gait  Skin: Warm/Dry, without rashes or lesions  Lab Results:   5/2/22  2:00 PM     Glucose 65 - 99 mg/dL 121 High     Comment: Specimen slightly hemolyzed, results may be affected     BUN 7 - 28 mg/dL 29 High  Comment: Specimen slightly hemolyzed, results may be affected  Creatinine 0 53 - 1 30 mg/dL 1 01    Comment: Specimen slightly hemolyzed, results may be affected  Sodium 135 - 145 mmol/L 135    Comment: Specimen slightly hemolyzed, results may be affected  Potassium 3 5 - 5 2 mmol/L 4 4    Comment: Specimen slightly hemolyzed, results may be affected  Chloride 100 - 109 mmol/L 105    Comment: Specimen slightly hemolyzed, results may be affected  Carbon Dioxide 23 - 31 mmol/L 26    Comment: Specimen slightly hemolyzed, results may be affected  Calcium 8 5 - 10 1 mg/dL 10 2 High     Comment: Specimen slightly hemolyzed, results may be affected  Alkaline Phosphatase 35 - 120 U/L 77    Comment: Specimen slightly hemolyzed, results may be affected  Albumin 3 5 - 4 8 g/dL 3 5    Comment: Specimen slightly hemolyzed, results may be affected  Bilirubin, Total 0 2 - 1 0 mg/dL 0 6    Comment: Use of this assay is not recommended for patients undergoing treatment with eltrombopag due to the potential for falsely elevated results  Specimen slightly hemolyzed, results may be affected  Protein, Total 6 3 - 8 3 g/dL 7 4    Comment: Specimen slightly hemolyzed, results may be affected  AST <41 U/L 36    Comment: Specimen slightly hemolyzed, results may be affected  ALT <56 U/L 53    Comment: Specimen slightly hemolyzed, results may be affected  Anion Gap 3 - 11 4    Comment: Specimen slightly hemolyzed, results may be affected  Lab Results   Component Value Date    TROPONINI <0 02 10/08/2018       Imaging Studies:     CT Chest:   10/1/14: Asc 44 mm  10/14/15: Asc 43 mm  10/27/16: Asc 43 mm  10/18/17: Asc 43  10/8/18: CTA dissection protocol: Asc 43 mm  12/23/19: aswc 43 mm  9/26/20: Asc 42 x 43  12/30/21: Asc 44 x 45 mm    Echocardiogram: 10/18/17  EF 65%  Aortic Valve Trileaflet  No AS, No AI  Aortic root normal size        I have personally reviewed pertinent films in PACS     PCP notes reviewed       Assessment:  Patient Active Problem List    Diagnosis Date Noted    COVID-19 01/12/2022    Hypercalcemia 12/13/2021    Subacromial bursitis of right shoulder joint 05/31/2019    Anaphylaxis 10/09/2018    History of tobacco abuse 10/08/2018    Elevated PSA 10/12/2017    Colonic polyp 09/20/2017    History of left hip replacement 09/20/2017    Alcohol consumption of more than two drinks per day 06/23/2016    Osteoarthritis of left hip 06/23/2016    Motion sickness 07/07/2015    Malignant melanoma of skin ([address] ) 06/15/2015    Aneurysm of ascending aorta (HCC) 10/02/2014    Idiopathic chronic gout of left ankle without tophus 09/26/2014    Essential hypertension 05/19/2014    Pure hypercholesterolemia 12/10/2012       Impression/Plan:    Stable ascending aortic aneurysm;  dating back to 2014 at which time maximal ascending aortic diameter measured 43-44 mm  Current CT scan in [facility] 2021 demonstrates stable findings of 44 x 45 mm  These findings were confirmed and shared with the patient today  Patient does not meet surgical criteria:  Ascending aortic aneurysm surgical triggers:  * 4 5 cm or > in the setting of + FH of rupture or dissection  * 5 cm with moderate or worse aortic valve disease  *  5 5 cm regardless of aortic valve function and without genetically associated aortic disease   *  Aortic growth > 4mm / year  *  Bicuspid aortic valve with valve dysfunction enough to meet indications for surgery and concomitant ascending aortic aneurysm 4 5 cm or >  * 4 5 cm or > in setting of existing connective tissue disease of Marfan's syndrome, Isauro-Danlos syndrome or familiar aneurysms syndrome      Patient remains asymptomatic  He is on appropriate medical management for HTN and HLD  He has no contributing family history  Follow-up monitoring is the treatment plan    Arrangements have been made for future surveillance to be completed with CT chest, without contrast in 1 romelia Maurice was comfortable with our recommendations, and their questions were answered to their satisfaction  Thank you for allowing us to participate in the care of this patient  Aortic Aneurysm Instructions were provided to the patient as follows:    1  No lifting more than 50 pounds  Regular aerobic exercise permitted and recommended  2  Maintain a controlled blood pressure with a goal of less than 120/80  3  Follow up in Aortic Clinic as recommended with radiology follow up as instructed  4  Report to the ER or call 911 immediately with the following signs / symptoms: sudden onset of back pain, chest pain or shortness of breath  5  Remain tobacco free  The patient recently had a screening colonoscopy in 2/8/22  Therefore GI referral is not indicated at this time       SIGNATURE: MARLEY Sarmiento  DATE: June 6, 2022  TIME: 10:50 AM Instructions: This plan will send the code FBSE to the PM system.  DO NOT or CHANGE the price. Price (Do Not Change): 0.00 Detail Level: Detailed

## 2024-11-12 DIAGNOSIS — E66.811 OBESITY (BMI 30.0-34.9): ICD-10-CM

## 2024-11-14 RX ORDER — TIRZEPATIDE 10 MG/.5ML
10 INJECTION, SOLUTION SUBCUTANEOUS WEEKLY
Qty: 2 ML | Refills: 0 | Status: SHIPPED | OUTPATIENT
Start: 2024-11-14 | End: 2024-11-19 | Stop reason: SDUPTHER

## 2024-11-15 ENCOUNTER — RA CDI HCC (OUTPATIENT)
Dept: OTHER | Facility: HOSPITAL | Age: 74
End: 2024-11-15

## 2024-11-19 ENCOUNTER — APPOINTMENT (OUTPATIENT)
Dept: LAB | Facility: MEDICAL CENTER | Age: 74
End: 2024-11-19
Payer: MEDICARE

## 2024-11-19 DIAGNOSIS — E66.811 OBESITY (BMI 30.0-34.9): ICD-10-CM

## 2024-11-19 DIAGNOSIS — R97.20 ELEVATED PROSTATE SPECIFIC ANTIGEN (PSA): ICD-10-CM

## 2024-11-19 LAB
PSA FREE MFR SERPL: 12.56 %
PSA FREE SERPL-MCNC: 0.83 NG/ML
PSA SERPL-MCNC: 6.64 NG/ML (ref 0–4)

## 2024-11-19 PROCEDURE — 36415 COLL VENOUS BLD VENIPUNCTURE: CPT

## 2024-11-19 PROCEDURE — 84153 ASSAY OF PSA TOTAL: CPT

## 2024-11-19 PROCEDURE — 84154 ASSAY OF PSA FREE: CPT

## 2024-11-21 RX ORDER — TIRZEPATIDE 10 MG/.5ML
10 INJECTION, SOLUTION SUBCUTANEOUS WEEKLY
Qty: 2 ML | Refills: 3 | Status: SHIPPED | OUTPATIENT
Start: 2024-11-21

## 2024-11-22 ENCOUNTER — RESULTS FOLLOW-UP (OUTPATIENT)
Dept: UROLOGY | Facility: CLINIC | Age: 74
End: 2024-11-22

## 2024-11-26 ENCOUNTER — OFFICE VISIT (OUTPATIENT)
Dept: FAMILY MEDICINE CLINIC | Facility: CLINIC | Age: 74
End: 2024-11-26
Payer: MEDICARE

## 2024-11-26 VITALS
BODY MASS INDEX: 29.6 KG/M2 | WEIGHT: 236.8 LBS | OXYGEN SATURATION: 96 % | DIASTOLIC BLOOD PRESSURE: 80 MMHG | HEART RATE: 72 BPM | SYSTOLIC BLOOD PRESSURE: 134 MMHG

## 2024-11-26 DIAGNOSIS — N18.32 STAGE 3B CHRONIC KIDNEY DISEASE (HCC): ICD-10-CM

## 2024-11-26 DIAGNOSIS — I71.21 ANEURYSM OF ASCENDING AORTA WITHOUT RUPTURE (HCC): ICD-10-CM

## 2024-11-26 DIAGNOSIS — I10 ESSENTIAL HYPERTENSION: ICD-10-CM

## 2024-11-26 DIAGNOSIS — E21.3 HYPERPARATHYROIDISM (HCC): ICD-10-CM

## 2024-11-26 DIAGNOSIS — E66.811 OBESITY (BMI 30.0-34.9): ICD-10-CM

## 2024-11-26 DIAGNOSIS — M1A.09X0 CHRONIC GOUT OF MULTIPLE SITES, UNSPECIFIED CAUSE: ICD-10-CM

## 2024-11-26 DIAGNOSIS — I25.10 CORONARY ARTERY DISEASE INVOLVING NATIVE CORONARY ARTERY OF NATIVE HEART WITHOUT ANGINA PECTORIS: Primary | ICD-10-CM

## 2024-11-26 DIAGNOSIS — E78.00 PURE HYPERCHOLESTEROLEMIA: ICD-10-CM

## 2024-11-26 DIAGNOSIS — E83.52 HYPERCALCEMIA: ICD-10-CM

## 2024-11-26 DIAGNOSIS — M1A.0720 IDIOPATHIC CHRONIC GOUT OF LEFT ANKLE WITHOUT TOPHUS: ICD-10-CM

## 2024-11-26 DIAGNOSIS — C43.9 MALIGNANT MELANOMA OF SKIN (HCC): ICD-10-CM

## 2024-11-26 DIAGNOSIS — R97.20 ELEVATED PSA: ICD-10-CM

## 2024-11-26 DIAGNOSIS — Z23 ENCOUNTER FOR IMMUNIZATION: ICD-10-CM

## 2024-11-26 DIAGNOSIS — I50.41 ACUTE COMBINED SYSTOLIC AND DIASTOLIC CONGESTIVE HEART FAILURE (HCC): ICD-10-CM

## 2024-11-26 DIAGNOSIS — Z87.891 HISTORY OF TOBACCO ABUSE: ICD-10-CM

## 2024-11-26 DIAGNOSIS — I71.20 THORACIC AORTIC ANEURYSM WITHOUT RUPTURE, UNSPECIFIED PART (HCC): ICD-10-CM

## 2024-11-26 PROBLEM — R42 DIZZINESS: Status: RESOLVED | Noted: 2024-01-10 | Resolved: 2024-11-26

## 2024-11-26 PROCEDURE — 99214 OFFICE O/P EST MOD 30 MIN: CPT | Performed by: FAMILY MEDICINE

## 2024-11-26 PROCEDURE — 90662 IIV NO PRSV INCREASED AG IM: CPT

## 2024-11-26 PROCEDURE — G0008 ADMIN INFLUENZA VIRUS VAC: HCPCS

## 2024-11-26 RX ORDER — ALLOPURINOL 100 MG/1
200 TABLET ORAL DAILY
Qty: 200 TABLET | Refills: 3 | Status: SHIPPED | OUTPATIENT
Start: 2024-11-26

## 2024-11-26 RX ORDER — ROSUVASTATIN CALCIUM 5 MG/1
TABLET, COATED ORAL
Qty: 100 TABLET | Refills: 3 | Status: SHIPPED | OUTPATIENT
Start: 2024-11-26

## 2024-11-26 RX ORDER — TIRZEPATIDE 10 MG/.5ML
10 INJECTION, SOLUTION SUBCUTANEOUS WEEKLY
Qty: 2 ML | Refills: 3 | Status: SHIPPED | OUTPATIENT
Start: 2024-11-26

## 2024-11-26 NOTE — ASSESSMENT & PLAN NOTE
Since he has lost weight, his gout has basically been quiet.  He does remain on allopurinol 200 mg daily.  Check labs  Orders:    CBC and differential; Future    Comprehensive metabolic panel; Future    Uric acid; Future    CBC and differential; Future    Comprehensive metabolic panel; Future    Uric acid; Future

## 2024-11-26 NOTE — ASSESSMENT & PLAN NOTE
Blood pressure is well-controlled without medication at this time after recent weight loss.  Orders:    CBC and differential; Future    Comprehensive metabolic panel; Future    TSH, 3rd generation with Free T4 reflex; Future    Zepbound 10 MG/0.5ML auto-injector; Inject 0.5 mL (10 mg total) under the skin once a week    CBC and differential; Future    Comprehensive metabolic panel; Future    rosuvastatin (CRESTOR) 5 mg tablet; Take 1 tablet Monday Wednesday Friday.

## 2024-11-26 NOTE — ASSESSMENT & PLAN NOTE
Stable.  Continue follow-up with CT surgery.  Imaging was stable this year.  Blood pressure is excellent off medication.  Initiate rosuvastatin  Orders:    CBC and differential; Future    Comprehensive metabolic panel; Future    Lipid Panel with Direct LDL reflex; Future    Zepbound 10 MG/0.5ML auto-injector; Inject 0.5 mL (10 mg total) under the skin once a week

## 2024-11-26 NOTE — ASSESSMENT & PLAN NOTE
Continue monitoring labs.  Recent calcium levels have been okay.  Orders:    Comprehensive metabolic panel; Future    CBC and differential; Future    Comprehensive metabolic panel; Future    PTH, intact; Future

## 2024-11-26 NOTE — ASSESSMENT & PLAN NOTE
Continue Zepbound therapy.  Blood pressure remains well-controlled.  He is following with CT surgery at this time.  Orders:    TSH, 3rd generation with Free T4 reflex; Future    Zepbound 10 MG/0.5ML auto-injector; Inject 0.5 mL (10 mg total) under the skin once a week    rosuvastatin (CRESTOR) 5 mg tablet; Take 1 tablet Monday Wednesday Friday.

## 2024-11-26 NOTE — ASSESSMENT & PLAN NOTE
The patient has a history of coronary artery disease as noted by previous imaging studies.  He also has a thoracic aneurysm with a history of congestive heart failure.  Zepbound will be prescribed for vascular prevention in light of these multiple disease states.  Orders:    Comprehensive metabolic panel; Future    Lipid Panel with Direct LDL reflex; Future    Zepbound 10 MG/0.5ML auto-injector; Inject 0.5 mL (10 mg total) under the skin once a week    rosuvastatin (CRESTOR) 5 mg tablet; Take 1 tablet Monday Wednesday Friday.

## 2024-11-26 NOTE — ASSESSMENT & PLAN NOTE
He will continue Zepbound for history of obesity as well as vascular disease.  Orders:    Zepbound 10 MG/0.5ML auto-injector; Inject 0.5 mL (10 mg total) under the skin once a week

## 2024-11-26 NOTE — ASSESSMENT & PLAN NOTE
Orders:    Comprehensive metabolic panel; Future    Vitamin D 25 hydroxy; Future    PTH, intact; Future

## 2024-11-26 NOTE — ASSESSMENT & PLAN NOTE
Continue Zepbound.  I also am going to start him back on low-dose rosuvastatin 5 mg 3 times weekly to see if he tolerates this.  He has a history of myalgias with statins in the past but would like to try to minimize his overall cardiac risk.  Orders:    CBC and differential; Future    Comprehensive metabolic panel; Future    rosuvastatin (CRESTOR) 5 mg tablet; Take 1 tablet Monday Wednesday Friday.

## 2024-11-26 NOTE — ASSESSMENT & PLAN NOTE
Wt Readings from Last 3 Encounters:   11/26/24 107 kg (236 lb 12.8 oz)   09/10/24 111 kg (245 lb 9.6 oz)   08/30/24 112 kg (248 lb)   No signs of fluid overload.  Continue as needed furosemide.            Orders:    CBC and differential; Future    Comprehensive metabolic panel; Future    Zepbound 10 MG/0.5ML auto-injector; Inject 0.5 mL (10 mg total) under the skin once a week    CBC and differential; Future    Comprehensive metabolic panel; Future    Lipid Panel with Direct LDL reflex; Future

## 2024-11-26 NOTE — ASSESSMENT & PLAN NOTE
Lab Results   Component Value Date    EGFR 60 09/06/2024    EGFR 51 08/16/2024    EGFR 46 06/03/2024    CREATININE 1.18 09/06/2024    CREATININE 1.33 (H) 08/16/2024    CREATININE 1.45 (H) 06/03/2024     Monitor labs.  Orders:    CBC and differential; Future    Comprehensive metabolic panel; Future    Zepbound 10 MG/0.5ML auto-injector; Inject 0.5 mL (10 mg total) under the skin once a week    CBC and differential; Future    Comprehensive metabolic panel; Future    Lipid Panel with Direct LDL reflex; Future

## 2024-11-26 NOTE — ASSESSMENT & PLAN NOTE
Check PSA prior to follow-up.  This has been stable.  Orders:    PSA Total, Diagnostic; Future    PSA Total, Diagnostic; Future

## 2024-11-26 NOTE — PROGRESS NOTES
Name: Erlin Hawkins      : 1950      MRN: [id number]  Encounter Provider: Pascual Valencia Jr, MD  Encounter Date: 2024   Encounter department: [facility]  :  Assessment & Plan  Coronary artery disease involving native coronary artery of native heart without angina pectoris  The patient has a history of coronary artery disease as noted by previous imaging studies.  He also has a thoracic aneurysm with a history of congestive heart failure.  Zepbound will be prescribed for vascular prevention in light of these multiple disease states.  Orders:    Comprehensive metabolic panel; Future    Lipid Panel with Direct LDL reflex; Future    Zepbound 10 MG/0.5ML auto-injector; Inject 0.5 mL (10 mg total) under the skin once a week    rosuvastatin (CRESTOR) 5 mg tablet; Take 1 tablet .    Essential hypertension  Blood pressure is well-controlled without medication at this time after recent weight loss.  Orders:    CBC and differential; Future    Comprehensive metabolic panel; Future    TSH, 3rd generation with Free T4 reflex; Future    Zepbound 10 MG/0.5ML auto-injector; Inject 0.5 mL (10 mg total) under the skin once a week    CBC and differential; Future    Comprehensive metabolic panel; Future    rosuvastatin (CRESTOR) 5 mg tablet; Take 1 tablet .    Encounter for immunization    Orders:    influenza vaccine, high-dose, PF 0.5 mL (Fluzone High Dose)    Aneurysm of ascending aorta without rupture (HCC)  Continue Zepbound therapy.  Blood pressure remains well-controlled.  He is following with CT surgery at this time.  Orders:    TSH, 3rd generation with Free T4 reflex; Future    Zepbound 10 MG/0.5ML auto-injector; Inject 0.5 mL (10 mg total) under the skin once a week    rosuvastatin (CRESTOR) 5 mg tablet; Take 1 tablet .    Acute combined systolic and diastolic congestive heart failure (HCC)  Wt Readings from  Last 3 Encounters:   11/26/24 107 kg (236 lb 12.8 oz)   09/10/24 111 kg (245 lb 9.6 oz)   08/30/24 112 kg (248 lb)   No signs of fluid overload.  Continue as needed furosemide.            Orders:    CBC and differential; Future    Comprehensive metabolic panel; Future    Zepbound 10 MG/0.5ML auto-injector; Inject 0.5 mL (10 mg total) under the skin once a week    CBC and differential; Future    Comprehensive metabolic panel; Future    Lipid Panel with Direct LDL reflex; Future    History of tobacco abuse    Orders:    CBC and differential; Future    Hyperparathyroidism (HCC)    Orders:    Comprehensive metabolic panel; Future    Vitamin D 25 hydroxy; Future    PTH, intact; Future    Obesity (BMI 30.0-34.9)    He will continue Zepbound for history of obesity as well as vascular disease.  Orders:    Zepbound 10 MG/0.5ML auto-injector; Inject 0.5 mL (10 mg total) under the skin once a week    Hypercalcemia  Continue monitoring labs.  Recent calcium levels have been okay.  Orders:    Comprehensive metabolic panel; Future    CBC and differential; Future    Comprehensive metabolic panel; Future    PTH, intact; Future    Pure hypercholesterolemia  Continue Zepbound.  I also am going to start him back on low-dose rosuvastatin 5 mg 3 times weekly to see if he tolerates this.  He has a history of myalgias with statins in the past but would like to try to minimize his overall cardiac risk.  Orders:    CBC and differential; Future    Comprehensive metabolic panel; Future    rosuvastatin (CRESTOR) 5 mg tablet; Take 1 tablet Monday Wednesday Friday.    Thoracic aortic aneurysm without rupture, unspecified part (HCC)  Stable.  Continue follow-up with CT surgery.  Imaging was stable this year.  Blood pressure is excellent off medication.  Initiate rosuvastatin  Orders:    CBC and differential; Future    Comprehensive metabolic panel; Future    Lipid Panel with Direct LDL reflex; Future    Zepbound 10 MG/0.5ML auto-injector; Inject  0.5 mL (10 mg total) under the skin once a week    Elevated PSA  Check PSA prior to follow-up.  This has been stable.  Orders:    PSA Total, Diagnostic; Future    PSA Total, Diagnostic; Future    Stage 3b chronic kidney disease (HCC)  Lab Results   Component Value Date    EGFR 60 09/06/2024    EGFR 51 08/16/2024    EGFR 46 06/03/2024    CREATININE 1.18 09/06/2024    CREATININE 1.33 (H) 08/16/2024    CREATININE 1.45 (H) 06/03/2024     Monitor labs.  Orders:    CBC and differential; Future    Comprehensive metabolic panel; Future    Zepbound 10 MG/0.5ML auto-injector; Inject 0.5 mL (10 mg total) under the skin once a week    CBC and differential; Future    Comprehensive metabolic panel; Future    Lipid Panel with Direct LDL reflex; Future    Idiopathic chronic gout of left ankle without tophus  Since he has lost weight, his gout has basically been quiet.  He does remain on allopurinol 200 mg daily.  Check labs  Orders:    CBC and differential; Future    Comprehensive metabolic panel; Future    Uric acid; Future    CBC and differential; Future    Comprehensive metabolic panel; Future    Uric acid; Future    Chronic gout of multiple sites, unspecified cause    Orders:    allopurinol (ZYLOPRIM) 100 mg tablet; Take 2 tablets (200 mg total) by mouth daily    CBC and differential; Future    Comprehensive metabolic panel; Future    Uric acid; Future    Malignant melanoma of skin (HCC)  Continue follow-up with dermatology.              History of Present Illness     HPI patient presents today for follow-up of chronic health issues.  He is feeling much better on Zepbound and has lost a lot of weight.  He has a history of heart failure but has not had any lower extremity swelling or orthopnea.  He denies chest pain.  He does have known coronary artery disease as seen on previous imaging.  Fortunately, he is denying any anginal symptoms.  He has a history of hypertension but is off medication since losing weight.  He has history  of elevated PTH but calcium recently has been normal.  Review of Systems   Constitutional:  Negative for appetite change, chills, fatigue, fever and unexpected weight change.   HENT:  Negative for trouble swallowing.    Eyes:  Negative for visual disturbance.   Respiratory:  Negative for cough, chest tightness, shortness of breath and wheezing.    Cardiovascular:  Negative for chest pain, palpitations and leg swelling.   Gastrointestinal:  Negative for abdominal distention, abdominal pain, blood in stool, constipation and diarrhea.   Endocrine: Negative for polyuria.   Genitourinary:  Negative for difficulty urinating and flank pain.   Musculoskeletal:  Negative for arthralgias and myalgias.   Skin:  Negative for rash.   Neurological:  Negative for dizziness and light-headedness.   Hematological:  Negative for adenopathy. Does not bruise/bleed easily.   Psychiatric/Behavioral:  Negative for dysphoric mood and sleep disturbance. The patient is not nervous/anxious.           Objective   /80   Pulse 72   Wt 107 kg (236 lb 12.8 oz)   SpO2 96%   BMI 29.60 kg/m²      Physical Exam  Constitutional:       General: He is not in acute distress.     Appearance: Normal appearance. He is well-developed. He is not diaphoretic.   HENT:      Head: Normocephalic.      Right Ear: External ear normal.      Left Ear: External ear normal.      Nose: Nose normal.   Eyes:      General:         Right eye: No discharge.         Left eye: No discharge.      Conjunctiva/sclera: Conjunctivae normal.      Pupils: Pupils are equal, round, and reactive to light.   Neck:      Thyroid: No thyromegaly.      Trachea: No tracheal deviation.   Cardiovascular:      Rate and Rhythm: Normal rate and regular rhythm.      Heart sounds: Normal heart sounds. No murmur heard.     No friction rub.   Pulmonary:      Effort: Pulmonary effort is normal. No respiratory distress.      Breath sounds: Normal breath sounds. No wheezing.   Chest:      Chest  wall: No tenderness.   Abdominal:      General: There is no distension.      Palpations: There is no mass.      Tenderness: There is no abdominal tenderness. There is no guarding or rebound.      Hernia: No hernia is present.   Musculoskeletal:         General: No swelling or deformity.      Cervical back: Normal range of motion.      Right lower leg: No edema.      Left lower leg: No edema.   Skin:     Findings: No erythema or rash.   Neurological:      General: No focal deficit present.      Mental Status: He is alert.      Cranial Nerves: No cranial nerve deficit.      Coordination: Coordination normal.   Psychiatric:         Thought Content: Thought content normal.

## 2024-12-03 ENCOUNTER — APPOINTMENT (OUTPATIENT)
Dept: LAB | Facility: MEDICAL CENTER | Age: 74
End: 2024-12-03
Payer: MEDICARE

## 2024-12-03 DIAGNOSIS — E83.52 HYPERCALCEMIA: ICD-10-CM

## 2024-12-03 DIAGNOSIS — I50.41 ACUTE COMBINED SYSTOLIC AND DIASTOLIC CONGESTIVE HEART FAILURE (HCC): ICD-10-CM

## 2024-12-03 DIAGNOSIS — I10 ESSENTIAL HYPERTENSION: ICD-10-CM

## 2024-12-03 DIAGNOSIS — M1A.0720 IDIOPATHIC CHRONIC GOUT OF LEFT ANKLE WITHOUT TOPHUS: ICD-10-CM

## 2024-12-03 DIAGNOSIS — E78.00 PURE HYPERCHOLESTEROLEMIA: ICD-10-CM

## 2024-12-03 DIAGNOSIS — I71.21 ANEURYSM OF ASCENDING AORTA WITHOUT RUPTURE (HCC): ICD-10-CM

## 2024-12-03 DIAGNOSIS — I25.10 CORONARY ARTERY DISEASE INVOLVING NATIVE CORONARY ARTERY OF NATIVE HEART WITHOUT ANGINA PECTORIS: ICD-10-CM

## 2024-12-03 DIAGNOSIS — Z87.891 HISTORY OF TOBACCO ABUSE: ICD-10-CM

## 2024-12-03 DIAGNOSIS — I71.20 THORACIC AORTIC ANEURYSM WITHOUT RUPTURE, UNSPECIFIED PART (HCC): ICD-10-CM

## 2024-12-03 DIAGNOSIS — N18.32 STAGE 3B CHRONIC KIDNEY DISEASE (HCC): ICD-10-CM

## 2024-12-03 DIAGNOSIS — R97.20 ELEVATED PSA: ICD-10-CM

## 2024-12-03 LAB
ALBUMIN SERPL BCG-MCNC: 4.4 G/DL (ref 3.5–5)
ALP SERPL-CCNC: 76 U/L (ref 34–104)
ALT SERPL W P-5'-P-CCNC: 61 U/L (ref 7–52)
ANION GAP SERPL CALCULATED.3IONS-SCNC: 10 MMOL/L (ref 4–13)
AST SERPL W P-5'-P-CCNC: 51 U/L (ref 13–39)
BASOPHILS # BLD AUTO: 0.07 THOUSANDS/ΜL (ref 0–0.1)
BASOPHILS NFR BLD AUTO: 1 % (ref 0–1)
BILIRUB SERPL-MCNC: 0.64 MG/DL (ref 0.2–1)
BUN SERPL-MCNC: 18 MG/DL (ref 5–25)
CALCIUM SERPL-MCNC: 10 MG/DL (ref 8.4–10.2)
CHLORIDE SERPL-SCNC: 100 MMOL/L (ref 96–108)
CHOLEST SERPL-MCNC: 182 MG/DL (ref ?–200)
CO2 SERPL-SCNC: 30 MMOL/L (ref 21–32)
CREAT SERPL-MCNC: 1.2 MG/DL (ref 0.6–1.3)
EOSINOPHIL # BLD AUTO: 0.15 THOUSAND/ΜL (ref 0–0.61)
EOSINOPHIL NFR BLD AUTO: 3 % (ref 0–6)
ERYTHROCYTE [DISTWIDTH] IN BLOOD BY AUTOMATED COUNT: 12.5 % (ref 11.6–15.1)
GFR SERPL CREATININE-BSD FRML MDRD: 58 ML/MIN/1.73SQ M
GLUCOSE P FAST SERPL-MCNC: 88 MG/DL (ref 65–99)
HCT VFR BLD AUTO: 50.7 % (ref 36.5–49.3)
HDLC SERPL-MCNC: 78 MG/DL
HGB BLD-MCNC: 17.2 G/DL (ref 12–17)
IMM GRANULOCYTES # BLD AUTO: 0.02 THOUSAND/UL (ref 0–0.2)
IMM GRANULOCYTES NFR BLD AUTO: 0 % (ref 0–2)
LDLC SERPL CALC-MCNC: 73 MG/DL (ref 0–100)
LYMPHOCYTES # BLD AUTO: 1.73 THOUSANDS/ΜL (ref 0.6–4.47)
LYMPHOCYTES NFR BLD AUTO: 31 % (ref 14–44)
MCH RBC QN AUTO: 32 PG (ref 26.8–34.3)
MCHC RBC AUTO-ENTMCNC: 33.9 G/DL (ref 31.4–37.4)
MCV RBC AUTO: 94 FL (ref 82–98)
MONOCYTES # BLD AUTO: 0.55 THOUSAND/ΜL (ref 0.17–1.22)
MONOCYTES NFR BLD AUTO: 10 % (ref 4–12)
NEUTROPHILS # BLD AUTO: 3.13 THOUSANDS/ΜL (ref 1.85–7.62)
NEUTS SEG NFR BLD AUTO: 55 % (ref 43–75)
NRBC BLD AUTO-RTO: 0 /100 WBCS
PLATELET # BLD AUTO: 148 THOUSANDS/UL (ref 149–390)
PMV BLD AUTO: 10.4 FL (ref 8.9–12.7)
POTASSIUM SERPL-SCNC: 4.3 MMOL/L (ref 3.5–5.3)
PROT SERPL-MCNC: 7.2 G/DL (ref 6.4–8.4)
PSA SERPL-MCNC: 7.17 NG/ML (ref 0–4)
RBC # BLD AUTO: 5.38 MILLION/UL (ref 3.88–5.62)
SODIUM SERPL-SCNC: 140 MMOL/L (ref 135–147)
TRIGL SERPL-MCNC: 156 MG/DL (ref ?–150)
TSH SERPL DL<=0.05 MIU/L-ACNC: 3 UIU/ML (ref 0.45–4.5)
URATE SERPL-MCNC: 5.1 MG/DL (ref 3.5–8.5)
WBC # BLD AUTO: 5.65 THOUSAND/UL (ref 4.31–10.16)

## 2024-12-03 PROCEDURE — 36415 COLL VENOUS BLD VENIPUNCTURE: CPT

## 2024-12-03 PROCEDURE — 84550 ASSAY OF BLOOD/URIC ACID: CPT

## 2024-12-03 PROCEDURE — 84153 ASSAY OF PSA TOTAL: CPT

## 2024-12-03 PROCEDURE — 80053 COMPREHEN METABOLIC PANEL: CPT

## 2024-12-03 PROCEDURE — 80061 LIPID PANEL: CPT

## 2024-12-03 PROCEDURE — 84443 ASSAY THYROID STIM HORMONE: CPT

## 2024-12-03 PROCEDURE — 85025 COMPLETE CBC W/AUTO DIFF WBC: CPT

## 2024-12-06 ENCOUNTER — RESULTS FOLLOW-UP (OUTPATIENT)
Dept: FAMILY MEDICINE CLINIC | Facility: CLINIC | Age: 74
End: 2024-12-06

## 2024-12-06 DIAGNOSIS — R79.89 ELEVATED LFTS: Primary | ICD-10-CM

## 2024-12-11 ENCOUNTER — OFFICE VISIT (OUTPATIENT)
Dept: UROLOGY | Facility: CLINIC | Age: 74
End: 2024-12-11

## 2024-12-11 VITALS
DIASTOLIC BLOOD PRESSURE: 80 MMHG | HEART RATE: 70 BPM | WEIGHT: 234 LBS | SYSTOLIC BLOOD PRESSURE: 110 MMHG | HEIGHT: 75 IN | BODY MASS INDEX: 29.09 KG/M2 | OXYGEN SATURATION: 95 %

## 2024-12-11 DIAGNOSIS — R97.20 ELEVATED PROSTATE SPECIFIC ANTIGEN (PSA): Primary | ICD-10-CM

## 2024-12-11 RX ORDER — ASPIRIN 81 MG/1
81 TABLET, CHEWABLE ORAL DAILY
COMMUNITY

## 2024-12-11 NOTE — PROGRESS NOTES
Office Visit- Urology  Erlin Hawkins 1950 MRN: [id number]      Assessment/Discussion/Plan    74 y.o. male managed by     Elevated PSA  -Patient has had an elevated PSA since 2020  -Has ranged between 3.5-8.26  -Most recent PSA returned at 7.1 12/3/2024 from 6.7 in August 2024  -Free PSA did return at 12% back in November 2024  -Met with Dr. Whitman in March 2024 for a initial consultation.  Had the Prostate examination with no significant abnormality palpated  -Patient not inclined for prostate biopsy  -He had a multiparametric MRI of the prostate in March 2024 that returned with a PI-RADS 3 lesion  -Patient decided not to proceed with biopsy and continue with observation with continual trend of PSA.  -Patient previously opted for continued observation and deferring prostate biopsy  -PSA density calculated at 0.12 today once again had a conversation with the patient about his options including proceeding with a multiparametric MRI the prostate versus observation.  Reviewed risk of progression of prostate cancer prostate cancer was present.  Reviewed risk biopsy.  At this point in time patient still feels strongly that he does not want to proceed with a prostate biopsy unless absolutely necessary.  Patient opts for continued observation  -He will follow-up in 6 months with plan for a PSA and prostate exam at that point in time could consider repeat multiparametric MR! depending on results of updated PSA    Chief Complaint:   Erlin is a 74 y.o. male presenting to the office for a follow up visit regarding elevated PSA        Subjective    Hx 8/30/2024  Patient is a 73-year-old male who presents for follow-up in regards to elevated PSA.  PSA has been elevated since at least 2020 and PSA max was 8.26.  He saw Dr. Whitman in March 2024 had prostate exam with no significant abnormality.  No known family history of prostate cancer.  He had a multiparametric MR the prostate that returned with a PI-RADS 3  lesion.  He is very hesitant to proceed with prostate biopsy.  He ultimately decided for continued observation and returns to the office today for further discussion.  His most recent PSA returned at 6.7     Hx 12/11/2024  Patient presents to the office today for follow-up.  Since last seen he had 2 PSAs with a PSA of 6.6 in November 2024 and 7.1 on 3/3/2024.  Patient is still opting for observation as he states that he does not consider biopsy unless absolutely necessary or provider tells him he actually needs to proceed with this        ROS:   Review of Systems   Constitutional: Negative.  Negative for chills, fatigue and fever.   HENT: Negative.     Respiratory:  Negative for shortness of breath.    Cardiovascular:  Negative for chest pain.   Gastrointestinal: Negative.  Negative for abdominal pain.   Endocrine: Negative.    Musculoskeletal: Negative.    Skin: Negative.    Neurological: Negative.  Negative for dizziness and light-headedness.   Hematological: Negative.    Psychiatric/Behavioral: Negative.           Past Medical History  Past Medical History:   Diagnosis Date    Abnormal chest CT     RESOLVED: 15JUN2016    Balance disorder     RESOLVED: 15JUN2016    Colon polyp     COVID-19 01/12/2022    Positive home test 1/10/22    Elevated brain natriuretic peptide (BNP) level 05/12/2023    Elevated d-dimer 05/12/2023    Elevated PSA     Lumbar radiculopathy     RESOLVED: 15JUN2016    Lumbar spondylosis     RESOLVED: 15JUN2016    Melanoma (HCC)        Past Surgical History  Past Surgical History:   Procedure Laterality Date    CHOLECYSTECTOMY      COLONOSCOPY  02/08/2022    OR RPR/ADVMNT FLXR TDN N/Z/2 W/O FR GRAFT EA TENDON Right 08/27/2019    Procedure: REPAIR FLEXOR TENDON right small FINGER;  Surgeon: Renaldo Bravo MD;  Location: [facility];  Service: Orthopedics    SKIN CANCER EXCISION      TONSILLECTOMY      TOTAL HIP ARTHROPLASTY Left 06/2016       Past Family History  Family History   Problem Relation  Age of Onset    Cancer Father     Arthritis Family     Colon polyps Family        Past Social history  Social History     Socioeconomic History    Marital status: /Civil Union     Spouse name: Not on file    Number of children: Not on file    Years of education: Not on file    Highest education level: Not on file   Occupational History    Not on file   Tobacco Use    Smoking status: Some Days     Current packs/day: 0.25     Average packs/day: 0.3 packs/day for 4.2 years (1.0 ttl pk-yrs)     Types: Cigarettes, Cigars     Start date: 10/1/2020    Smokeless tobacco: Never   Vaping Use    Vaping status: Never Used   Substance and Sexual Activity    Alcohol use: Yes     Comment: DAILY, liquor    Drug use: Never    Sexual activity: Not on file   Other Topics Concern    Not on file   Social History Narrative    Not on file     Social Drivers of Health     Financial Resource Strain: Not on file   Food Insecurity: No Food Insecurity (5/13/2024)    Nursing - Inadequate Food Risk Classification     Worried About Running Out of Food in the Last Year: Never true     Ran Out of Food in the Last Year: Never true     Ran Out of Food in the Last Year: Not on file   Transportation Needs: No Transportation Needs (5/13/2024)    PRAPARE - Transportation     Lack of Transportation (Medical): No     Lack of Transportation (Non-Medical): No   Physical Activity: Not on file   Stress: Not on file   Social Connections: Not on file   Intimate Partner Violence: Not on file   Housing Stability: Low Risk  (5/13/2024)    Housing Stability Vital Sign     Unable to Pay for Housing in the Last Year: No     Number of Times Moved in the Last Year: 1     Homeless in the Last Year: No       Current Medications  Current Outpatient Medications   Medication Sig Dispense Refill    allopurinol (ZYLOPRIM) 100 mg tablet Take 2 tablets (200 mg total) by mouth daily 200 tablet 3    aspirin 81 mg chewable tablet Chew 81 mg daily      rosuvastatin (CRESTOR)  "5 mg tablet Take 1 tablet Monday Wednesday Friday. 100 tablet 3    Zepbound 10 MG/0.5ML auto-injector Inject 0.5 mL (10 mg total) under the skin once a week 2 mL 3    furosemide (LASIX) 20 mg tablet TAKE 1 TABLET BY MOUTH DAILY AS NEEDED FOR LOWER EXTREMITY SWELLING. TAKE POTASSIUM SUPLEMENT IF TAKING 100 tablet 1    scopolamine (TRANSDERM-SCOP) 1 mg/3 days TD 72 hr patch Place 1 patch on the skin over 72 hours every third day 10 patch 5     No current facility-administered medications for this visit.       Allergies  Allergies   Allergen Reactions    Atorvastatin Myalgia    Honey Bee Venom Swelling       OBJECTIVE    Vitals   Vitals:    12/11/24 1259   BP: 110/80   BP Location: Left arm   Patient Position: Sitting   Cuff Size: Standard   Pulse: 70   SpO2: 95%   Weight: 106 kg (234 lb)   Height: 6' 3\" (1.905 m)       PVR:    Physical Exam  Constitutional:       General: He is not in acute distress.     Appearance: Normal appearance. He is normal weight. He is not ill-appearing or toxic-appearing.   HENT:      Head: Normocephalic and atraumatic.   Eyes:      Conjunctiva/sclera: Conjunctivae normal.   Cardiovascular:      Rate and Rhythm: Normal rate.   Pulmonary:      Effort: Pulmonary effort is normal. No respiratory distress.   Skin:     General: Skin is warm and dry.   Neurological:      General: No focal deficit present.      Mental Status: He is alert and oriented to person, place, and time.      Cranial Nerves: No cranial nerve deficit.   Psychiatric:         Mood and Affect: Mood normal.         Behavior: Behavior normal.         Thought Content: Thought content normal.          Labs:     Lab Results   Component Value Date    PSA 7.168 (H) 12/03/2024    PSA 6.640 (H) 11/19/2024    PSA 6.738 (H) 08/16/2024    PSA 6.75 (H) 06/03/2024     Lab Results   Component Value Date    CREATININE 1.20 12/03/2024      No results found for: \"HGBA1C\"  Lab Results   Component Value Date    GLUCOSE 95 10/08/2018    CALCIUM 10.0 " 12/03/2024     12/18/2015    K 4.3 12/03/2024    CO2 30 12/03/2024     12/03/2024    BUN 18 12/03/2024    CREATININE 1.20 12/03/2024       I have personally reviewed all pertinent lab results and reviewed with patient    Imaging       Asad Cordero PA-C  Date: 12/11/2024 Time: 1:08 PM  [facility]    This note was written using fluency dictation software. Please excuse any resulting minor grammatical errors.

## 2025-02-14 DIAGNOSIS — I71.21 ANEURYSM OF ASCENDING AORTA WITHOUT RUPTURE (HCC): ICD-10-CM

## 2025-02-14 DIAGNOSIS — E66.811 OBESITY (BMI 30.0-34.9): ICD-10-CM

## 2025-02-14 DIAGNOSIS — I71.20 THORACIC AORTIC ANEURYSM WITHOUT RUPTURE, UNSPECIFIED PART (HCC): ICD-10-CM

## 2025-02-14 DIAGNOSIS — N18.32 STAGE 3B CHRONIC KIDNEY DISEASE (HCC): ICD-10-CM

## 2025-02-14 DIAGNOSIS — I50.41 ACUTE COMBINED SYSTOLIC AND DIASTOLIC CONGESTIVE HEART FAILURE (HCC): ICD-10-CM

## 2025-02-14 DIAGNOSIS — I25.10 CORONARY ARTERY DISEASE INVOLVING NATIVE CORONARY ARTERY OF NATIVE HEART WITHOUT ANGINA PECTORIS: ICD-10-CM

## 2025-02-14 DIAGNOSIS — I10 ESSENTIAL HYPERTENSION: ICD-10-CM

## 2025-02-17 RX ORDER — TIRZEPATIDE 10 MG/.5ML
10 INJECTION, SOLUTION SUBCUTANEOUS WEEKLY
Qty: 2 ML | Refills: 3 | Status: SHIPPED | OUTPATIENT
Start: 2025-02-17

## 2025-02-19 DIAGNOSIS — I71.20 THORACIC AORTIC ANEURYSM WITHOUT RUPTURE, UNSPECIFIED PART (HCC): ICD-10-CM

## 2025-02-19 DIAGNOSIS — E66.811 OBESITY (BMI 30.0-34.9): ICD-10-CM

## 2025-02-19 DIAGNOSIS — I50.41 ACUTE COMBINED SYSTOLIC AND DIASTOLIC CONGESTIVE HEART FAILURE (HCC): ICD-10-CM

## 2025-02-19 DIAGNOSIS — I10 ESSENTIAL HYPERTENSION: ICD-10-CM

## 2025-02-19 DIAGNOSIS — N18.32 STAGE 3B CHRONIC KIDNEY DISEASE (HCC): ICD-10-CM

## 2025-02-19 DIAGNOSIS — I25.10 CORONARY ARTERY DISEASE INVOLVING NATIVE CORONARY ARTERY OF NATIVE HEART WITHOUT ANGINA PECTORIS: ICD-10-CM

## 2025-02-19 DIAGNOSIS — I71.21 ANEURYSM OF ASCENDING AORTA WITHOUT RUPTURE (HCC): ICD-10-CM

## 2025-02-20 RX ORDER — TIRZEPATIDE 10 MG/.5ML
10 INJECTION, SOLUTION SUBCUTANEOUS WEEKLY
Qty: 2 ML | Refills: 3 | OUTPATIENT
Start: 2025-02-20

## 2025-06-17 ENCOUNTER — TELEPHONE (OUTPATIENT)
Dept: UROLOGY | Facility: MEDICAL CENTER | Age: 75
End: 2025-06-17

## 2025-06-17 NOTE — TELEPHONE ENCOUNTER
Left message for patient reminding him that he has an appointment on 6/24 and asked that he have his PSA blood work done before his appointment

## 2025-06-21 ENCOUNTER — APPOINTMENT (OUTPATIENT)
Dept: LAB | Facility: MEDICAL CENTER | Age: 75
End: 2025-06-21
Payer: MEDICARE

## 2025-06-21 DIAGNOSIS — R97.20 ELEVATED PROSTATE SPECIFIC ANTIGEN (PSA): ICD-10-CM

## 2025-06-21 DIAGNOSIS — R97.20 ELEVATED PSA: ICD-10-CM

## 2025-06-21 DIAGNOSIS — I50.41 ACUTE COMBINED SYSTOLIC AND DIASTOLIC CONGESTIVE HEART FAILURE (HCC): ICD-10-CM

## 2025-06-21 DIAGNOSIS — E83.52 HYPERCALCEMIA: ICD-10-CM

## 2025-06-21 DIAGNOSIS — I10 ESSENTIAL HYPERTENSION: ICD-10-CM

## 2025-06-21 DIAGNOSIS — E21.3 HYPERPARATHYROIDISM (HCC): ICD-10-CM

## 2025-06-21 DIAGNOSIS — R79.89 ELEVATED LFTS: ICD-10-CM

## 2025-06-21 DIAGNOSIS — M1A.09X0 CHRONIC GOUT OF MULTIPLE SITES, UNSPECIFIED CAUSE: ICD-10-CM

## 2025-06-21 DIAGNOSIS — M1A.0720 IDIOPATHIC CHRONIC GOUT OF LEFT ANKLE WITHOUT TOPHUS: ICD-10-CM

## 2025-06-21 DIAGNOSIS — N18.32 STAGE 3B CHRONIC KIDNEY DISEASE (HCC): ICD-10-CM

## 2025-06-21 LAB
25(OH)D3 SERPL-MCNC: 49 NG/ML (ref 30–100)
ALBUMIN SERPL BCG-MCNC: 4.3 G/DL (ref 3.5–5)
ALP SERPL-CCNC: 88 U/L (ref 34–104)
ALT SERPL W P-5'-P-CCNC: 35 U/L (ref 7–52)
ANION GAP SERPL CALCULATED.3IONS-SCNC: 9 MMOL/L (ref 4–13)
AST SERPL W P-5'-P-CCNC: 34 U/L (ref 13–39)
BASOPHILS # BLD AUTO: 0.1 THOUSANDS/ÂΜL (ref 0–0.1)
BASOPHILS NFR BLD AUTO: 2 % (ref 0–1)
BILIRUB SERPL-MCNC: 1.31 MG/DL (ref 0.2–1)
BUN SERPL-MCNC: 21 MG/DL (ref 5–25)
CALCIUM SERPL-MCNC: 10 MG/DL (ref 8.4–10.2)
CHLORIDE SERPL-SCNC: 100 MMOL/L (ref 96–108)
CHOLEST SERPL-MCNC: 158 MG/DL (ref ?–200)
CO2 SERPL-SCNC: 28 MMOL/L (ref 21–32)
CREAT SERPL-MCNC: 1.07 MG/DL (ref 0.6–1.3)
EOSINOPHIL # BLD AUTO: 0.23 THOUSAND/ÂΜL (ref 0–0.61)
EOSINOPHIL NFR BLD AUTO: 4 % (ref 0–6)
ERYTHROCYTE [DISTWIDTH] IN BLOOD BY AUTOMATED COUNT: 12.7 % (ref 11.6–15.1)
FERRITIN SERPL-MCNC: 194 NG/ML (ref 30–336)
GFR SERPL CREATININE-BSD FRML MDRD: 67 ML/MIN/1.73SQ M
GLUCOSE P FAST SERPL-MCNC: 96 MG/DL (ref 65–99)
HCT VFR BLD AUTO: 51.7 % (ref 36.5–49.3)
HDLC SERPL-MCNC: 76 MG/DL
HGB BLD-MCNC: 17.1 G/DL (ref 12–17)
IMM GRANULOCYTES # BLD AUTO: 0.02 THOUSAND/UL (ref 0–0.2)
IMM GRANULOCYTES NFR BLD AUTO: 0 % (ref 0–2)
IRON SATN MFR SERPL: 66 % (ref 15–50)
IRON SERPL-MCNC: 256 UG/DL (ref 50–212)
LDLC SERPL CALC-MCNC: 69 MG/DL (ref 0–100)
LYMPHOCYTES # BLD AUTO: 1.79 THOUSANDS/ÂΜL (ref 0.6–4.47)
LYMPHOCYTES NFR BLD AUTO: 31 % (ref 14–44)
MCH RBC QN AUTO: 31.4 PG (ref 26.8–34.3)
MCHC RBC AUTO-ENTMCNC: 33.1 G/DL (ref 31.4–37.4)
MCV RBC AUTO: 95 FL (ref 82–98)
MONOCYTES # BLD AUTO: 0.69 THOUSAND/ÂΜL (ref 0.17–1.22)
MONOCYTES NFR BLD AUTO: 12 % (ref 4–12)
NEUTROPHILS # BLD AUTO: 3.02 THOUSANDS/ÂΜL (ref 1.85–7.62)
NEUTS SEG NFR BLD AUTO: 51 % (ref 43–75)
NRBC BLD AUTO-RTO: 0 /100 WBCS
PLATELET # BLD AUTO: 149 THOUSANDS/UL (ref 149–390)
PMV BLD AUTO: 10.2 FL (ref 8.9–12.7)
POTASSIUM SERPL-SCNC: 5 MMOL/L (ref 3.5–5.3)
PROT SERPL-MCNC: 6.9 G/DL (ref 6.4–8.4)
PSA FREE MFR SERPL: 13.42 %
PSA FREE SERPL-MCNC: 0.74 NG/ML
PSA SERPL-MCNC: 5.54 NG/ML (ref 0–4)
PTH-INTACT SERPL-MCNC: 83.4 PG/ML (ref 12–88)
RBC # BLD AUTO: 5.45 MILLION/UL (ref 3.88–5.62)
SODIUM SERPL-SCNC: 137 MMOL/L (ref 135–147)
TIBC SERPL-MCNC: 389.2 UG/DL (ref 250–450)
TRANSFERRIN SERPL-MCNC: 278 MG/DL (ref 203–362)
TRIGL SERPL-MCNC: 65 MG/DL (ref ?–150)
UIBC SERPL-MCNC: 133 UG/DL (ref 155–355)
URATE SERPL-MCNC: 7.1 MG/DL (ref 3.5–8.5)
WBC # BLD AUTO: 5.85 THOUSAND/UL (ref 4.31–10.16)

## 2025-06-21 PROCEDURE — 82728 ASSAY OF FERRITIN: CPT

## 2025-06-21 PROCEDURE — 83540 ASSAY OF IRON: CPT

## 2025-06-21 PROCEDURE — 85025 COMPLETE CBC W/AUTO DIFF WBC: CPT

## 2025-06-21 PROCEDURE — 36415 COLL VENOUS BLD VENIPUNCTURE: CPT

## 2025-06-21 PROCEDURE — 80061 LIPID PANEL: CPT

## 2025-06-21 PROCEDURE — 84550 ASSAY OF BLOOD/URIC ACID: CPT

## 2025-06-21 PROCEDURE — 83970 ASSAY OF PARATHORMONE: CPT

## 2025-06-21 PROCEDURE — 83550 IRON BINDING TEST: CPT

## 2025-06-21 PROCEDURE — 80053 COMPREHEN METABOLIC PANEL: CPT

## 2025-06-21 PROCEDURE — 82306 VITAMIN D 25 HYDROXY: CPT

## 2025-06-21 PROCEDURE — 84154 ASSAY OF PSA FREE: CPT

## 2025-06-21 PROCEDURE — 84153 ASSAY OF PSA TOTAL: CPT

## 2025-06-24 ENCOUNTER — OFFICE VISIT (OUTPATIENT)
Dept: UROLOGY | Facility: CLINIC | Age: 75
End: 2025-06-24
Payer: MEDICARE

## 2025-06-24 ENCOUNTER — OFFICE VISIT (OUTPATIENT)
Dept: FAMILY MEDICINE CLINIC | Facility: CLINIC | Age: 75
End: 2025-06-24
Payer: MEDICARE

## 2025-06-24 VITALS
BODY MASS INDEX: 26.86 KG/M2 | SYSTOLIC BLOOD PRESSURE: 142 MMHG | HEIGHT: 75 IN | DIASTOLIC BLOOD PRESSURE: 78 MMHG | OXYGEN SATURATION: 98 % | WEIGHT: 216 LBS | HEART RATE: 64 BPM

## 2025-06-24 VITALS
DIASTOLIC BLOOD PRESSURE: 80 MMHG | HEIGHT: 74 IN | HEART RATE: 80 BPM | SYSTOLIC BLOOD PRESSURE: 136 MMHG | BODY MASS INDEX: 27.64 KG/M2 | WEIGHT: 215.4 LBS | OXYGEN SATURATION: 96 %

## 2025-06-24 DIAGNOSIS — E78.00 PURE HYPERCHOLESTEROLEMIA: ICD-10-CM

## 2025-06-24 DIAGNOSIS — N18.32 STAGE 3B CHRONIC KIDNEY DISEASE (HCC): ICD-10-CM

## 2025-06-24 DIAGNOSIS — Z78.9 ALCOHOL CONSUMPTION OF MORE THAN TWO DRINKS PER DAY: ICD-10-CM

## 2025-06-24 DIAGNOSIS — I71.20 THORACIC AORTIC ANEURYSM WITHOUT RUPTURE, UNSPECIFIED PART (HCC): ICD-10-CM

## 2025-06-24 DIAGNOSIS — R97.20 ELEVATED PSA: ICD-10-CM

## 2025-06-24 DIAGNOSIS — I25.10 CORONARY ARTERY DISEASE INVOLVING NATIVE CORONARY ARTERY OF NATIVE HEART WITHOUT ANGINA PECTORIS: ICD-10-CM

## 2025-06-24 DIAGNOSIS — Z87.891 HISTORY OF TOBACCO ABUSE: ICD-10-CM

## 2025-06-24 DIAGNOSIS — R73.9 HYPERGLYCEMIA: ICD-10-CM

## 2025-06-24 DIAGNOSIS — E21.3 HYPERPARATHYROIDISM (HCC): ICD-10-CM

## 2025-06-24 DIAGNOSIS — R97.20 ELEVATED PSA: Primary | ICD-10-CM

## 2025-06-24 DIAGNOSIS — I10 ESSENTIAL HYPERTENSION: ICD-10-CM

## 2025-06-24 DIAGNOSIS — E83.52 HYPERCALCEMIA: ICD-10-CM

## 2025-06-24 DIAGNOSIS — I50.42 CHRONIC COMBINED SYSTOLIC AND DIASTOLIC CONGESTIVE HEART FAILURE (HCC): ICD-10-CM

## 2025-06-24 DIAGNOSIS — C43.9 MALIGNANT MELANOMA OF SKIN (HCC): ICD-10-CM

## 2025-06-24 DIAGNOSIS — I71.21 ANEURYSM OF ASCENDING AORTA WITHOUT RUPTURE (HCC): ICD-10-CM

## 2025-06-24 DIAGNOSIS — Z00.00 MEDICARE ANNUAL WELLNESS VISIT, SUBSEQUENT: Primary | ICD-10-CM

## 2025-06-24 PROCEDURE — G2211 COMPLEX E/M VISIT ADD ON: HCPCS | Performed by: FAMILY MEDICINE

## 2025-06-24 PROCEDURE — 99214 OFFICE O/P EST MOD 30 MIN: CPT | Performed by: FAMILY MEDICINE

## 2025-06-24 PROCEDURE — 99213 OFFICE O/P EST LOW 20 MIN: CPT | Performed by: PHYSICIAN ASSISTANT

## 2025-06-24 PROCEDURE — G0439 PPPS, SUBSEQ VISIT: HCPCS | Performed by: FAMILY MEDICINE

## 2025-06-24 RX ORDER — PSEUDOEPHEDRINE HCL 120 MG
TABLET, EXTENDED RELEASE ORAL
COMMUNITY

## 2025-06-24 NOTE — ASSESSMENT & PLAN NOTE
Blood pressure is well-controlled at this time.  Continue current regimen.  Check labs prior to follow-up.  Orders:    CBC and differential; Future    Comprehensive metabolic panel; Future    Hemoglobin A1C; Future    Lipid Panel with Direct LDL reflex; Future

## 2025-06-24 NOTE — PROGRESS NOTES
Name: Erlin Hawkins      : 1950      MRN: [id number]  Encounter Provider: Pascual Valencia Jr, MD  Encounter Date: 2025   Encounter department: [facility] includes the [facility]  :  Assessment & Plan  Medicare annual wellness visit, subsequent  He declines COVID booster.  Vaccines are up-to-date otherwise except for Tdap which he can get at the pharmacy.       Essential hypertension  Blood pressure is well-controlled at this time.  Continue current regimen.  Check labs prior to follow-up.  Orders:    CBC and differential; Future    Comprehensive metabolic panel; Future    Hemoglobin A1C; Future    Lipid Panel with Direct LDL reflex; Future    Aneurysm of ascending aorta without rupture (HCC)  Continue CT surgery follow-up.  Risk factors are well-controlled.       Chronic combined systolic and diastolic congestive heart failure (HCC)  Wt Readings from Last 3 Encounters:   25 97.7 kg (215 lb 6.4 oz)   25 98 kg (216 lb)   24 106 kg (234 lb)     Stable at this time.  No signs of excessive fluid overload.  He is off furosemide.          Orders:    CBC and differential; Future    Comprehensive metabolic panel; Future    Lipid Panel with Direct LDL reflex; Future    Thoracic aortic aneurysm without rupture, unspecified part (HCC)  Blood pressure is excellent.  Not on blood pressure medicines.  He has quit smoking.       Malignant melanoma of skin (HCC)  He has history of melanoma.  Continue dermatology follow-up.       Hyperparathyroidism (HCC)  Monitor CMP  Orders:    Comprehensive metabolic panel; Future    Lipid Panel with Direct LDL reflex; Future    Elevated PSA  Monitor PSA  Orders:    PSA Total, Diagnostic; Future    Alcohol consumption of more than two drinks per day  Encouraged alcohol moderation.       History of tobacco abuse  [place], he quit smoking.       Pure hypercholesterolemia    Orders:    Comprehensive metabolic panel; Future    Lipid Panel with Direct LDL reflex;  Future    Hypercalcemia  monitor CMP and PTH  Orders:    PTH, intact; Future    Stage 3b chronic kidney disease (HCC)  Lab Results   Component Value Date    EGFR 67 06/21/2025    EGFR 58 12/03/2024    EGFR 60 09/06/2024    CREATININE 1.07 06/21/2025    CREATININE 1.20 12/03/2024    CREATININE 1.18 09/06/2024     Stable at this time.  Check labs prior to follow-up.  Orders:    Hemoglobin A1C; Future    Hyperglycemia    Orders:    Hemoglobin A1C; Future    Coronary artery disease involving native coronary artery of native heart without angina pectoris  Stable at this time.  He quit smoking.  He does remain on aspirin.            Preventive health issues were discussed with patient, and age appropriate screening tests were ordered as noted in patient's After Visit Summary. Personalized health advice and appropriate referrals for health education or preventive services given if needed, as noted in patient's After Visit Summary.    History of Present Illness     HPI patient presents here for Medicare wellness as well as a follow-up for chronic health issues.  He feels he is doing well.  He has stopped Zepbound for the time being but is considering going back on it.  He had great success with weight loss.  He does have a history of some vascular disease after many years of smoking.  He denies any current positive chest pain, shortness of breath or palpitations.  He has a previous history of DVT denies any excessive swelling.  Patient Care Team:  Pascual Burgess Jr., MD as PCP - General  MD Donell Adamson DO Jennifer Anne Banzhof, DO    Review of Systems  Medical History Reviewed by provider this encounter:       Annual Wellness Visit Questionnaire   Erlin is here for his Subsequent Wellness visit.     Health Risk Assessment:   Patient rates overall health as very good. Patient feels that their physical health rating is same. Patient is satisfied with their life. Eyesight was rated as slightly worse.  Hearing was rated as same. Patient feels that their emotional and mental health rating is same. Patients states they are never, rarely angry. Patient states they are sometimes unusually tired/fatigued. Pain experienced in the last 7 days has been none. Patient states that he has experienced no weight loss or gain in last 6 months.     Depression Screening:   PHQ-2 Score: 0      Fall Risk Screening:   In the past year, patient has experienced: no history of falling in past year      Home Safety:  Patient does not have trouble with stairs inside or outside of their home. Patient has working smoke alarms and has working carbon monoxide detector. Home safety hazards include: none.     Nutrition:   Current diet is Regular and Limited junk food.     Medications:   Patient is currently taking over-the-counter supplements. OTC medications include: see medication list. Patient is able to manage medications.     Activities of Daily Living (ADLs)/Instrumental Activities of Daily Living (IADLs):   Walk and transfer into and out of bed and chair?: Yes  Dress and groom yourself?: Yes    Bathe or shower yourself?: Yes    Feed yourself? Yes  Do your laundry/housekeeping?: Yes  Manage your money, pay your bills and track your expenses?: Yes  Make your own meals?: Yes    Do your own shopping?: Yes    Previous Hospitalizations:   Any hospitalizations or ED visits within the last 12 months?: No      Advance Care Planning:   Living will: No    Durable POA for healthcare: No    Advanced directive: No      Preventive Screenings      Cardiovascular Screening:    General: Screening Not Indicated and History Lipid Disorder      Diabetes Screening:     General: Screening Current      Prostate Cancer Screening:    General: Screening Not Indicated      Lung Cancer Screening:     General: Screening Not Indicated      Hepatitis C Screening:    General: Screening Current    Immunizations:  - Immunizations due: Zoster (Shingrix)    Screening, Brief  Intervention, and Referral to Treatment (SBIRT)     Screening  Typical number of drinks in a day: 3  Typical number of drinks in a week: 20  Interpretation: Low risk drinking behavior.    AUDIT-C Screenin) How often did you have a drink containing alcohol in the past year? 4 or more times a week  2) How many drinks did you have on a typical day when you were drinking in the past year? 3 to 4  3) How often did you have 6 or more drinks on one occasion in the past year? less than monthly    AUDIT-C Score: 5  Interpretation: Score 4-12 (male): POSITIVE screen for alcohol misuse    AUDIT Screenin) How often during the last year have you found that you were not able to stop drinking once you had started? 0 - never  5) How often during the last year have you failed to do what was normally expected from you because of drinking? 0 - never  6) How often during the last year have you needed a first drink in the morning to get yourself going after a heavy drinking session? 0 - never  7) How often during the last year have you had a feeling of guilt or remorse after drinking? 0 - never  8) How often during the last year have you been unable to remember what happened the night before because you had been drinking? 0 - never  9) Have you or someone else been injured as a result of your drinking? 0 - no  10) Has a relative or friend or a doctor or another health worker been concerned about your drinking or suggested you cut down? 0 - no    AUDIT Score: 5  Interpretation: Low risk alcohol consumption    Single Item Drug Screening:  How often have you used an illegal drug (including marijuana) or a prescription medication for non-medical reasons in the past year? never    Single Item Drug Screen Score: 0  Interpretation: Negative screen for possible drug use disorder    Social Drivers of Health     Food Insecurity: No Food Insecurity (2025)    Nursing - Inadequate Food Risk Classification     Worried About Running Out  of Food in the Last Year: Never true     Ran Out of Food in the Last Year: Never true   Transportation Needs: No Transportation Needs (6/24/2025)    PRAPARE - Transportation     Lack of Transportation (Medical): No     Lack of Transportation (Non-Medical): No   Housing Stability: Unknown (6/24/2025)    Housing Stability Vital Sign     Unable to Pay for Housing in the Last Year: No     Homeless in the Last Year: No   Utilities: Not At Risk (6/24/2025)    [facility] Utilities     Threatened with loss of utilities: No     No results found.    Objective   There were no vitals taken for this visit.    Physical Exam

## 2025-06-24 NOTE — PROGRESS NOTES
Name: Erlin Hawkins      : 1950      MRN: [id number]  Encounter Provider: Rosie Kinsey PA-C  Encounter Date: 2025   Encounter department: [facility]OLOG[facility] [place] END  :  Assessment & Plan  Elevated PSA  Prostate MRI 2024: 48 cc gland, 1 cm left PI-RADS 3 lesion, no EPE.  Latest PSA range 5-8, current 5.513% free.  No other biomarkers assessed.    Reviewed his history of the past 2 years and testing thus far.   There is a prostate nodule on MRI but has a lower risk of significant prostate cancer as pirads 3 compared to a 4 or 5 which would certainly prompt a biopsy  PSA density 0.11  Discussed options- fusion biopsy, observation of PSA trend, or additional prostate biomarker such as 4K score or myprostate score 2.0    MPS 2.0 ordered for pt home collection- reviewed collection, billing, and delivery through [employer]. will get results in 1-2 weeks and call him. if the biomarker is high risk would pursue fusion bx with existing MRI. If biomarker is low risk, will continue observation alone.       Lab Results   Component Value Date    PSA 5.535 (H) 2025    PSA 7.168 (H) 2024    PSA 6.640 (H) 2024               History of Present Illness   Erlin Hawkins is a 74 y.o. male who presents 6-month follow-up elevated PSA.  History of extensive recurrent deep vein thrombosis had elevated PSA and new clot prompting referral to urology in .  On aspirin only now. No voiding complaints and no prescription medication.  Prostate examination at index visit benign without nodularity or asymmetry pt reports discomfort with exams.  Upfront multiparametric MRI in 2024 shows a small PI-RADS 3 lesion options for fusion biopsy or observation with additional PSAs was recommended.  He is opted for PSA trending he had 1 outlier of a PSA of 8 at the first visit but has been within his 5 range since then.  Patient is not inclined to have prostate biopsy or other  "procedures.      AUA SYMPTOM SCORE      Flowsheet Row Most Recent Value   AUA SYMPTOM SCORE    How often have you had a sensation of not emptying your bladder completely after you finished urinating? 0 (P)     How often have you had to urinate again less than two hours after you finished urinating? 1 (P)     How often have you found you stopped and started again several times when you urinate? 0 (P)     How often have you found it difficult to postpone urination? 1 (P)     How often have you had a weak urinary stream? 1 (P)     How often have you had to push or strain to begin urination? 0 (P)     How many times did you most typically get up to urinate from the time you went to bed at night until the time you got up in the morning? 5 (P)     Quality of Life: If you were to spend the rest of your life with your urinary condition just the way it is now, how would you feel about that? 1 (P)     AUA SYMPTOM SCORE 8 (P)            Review of Systems   Constitutional: Negative.    Respiratory: Negative.     Cardiovascular: Negative.    Genitourinary:  Negative for decreased urine volume, difficulty urinating, dysuria, flank pain, frequency, hematuria, testicular pain and urgency.   Musculoskeletal: Negative.           Objective   /78 (BP Location: Left arm, Patient Position: Sitting, Cuff Size: Adult)   Pulse 64   Ht 6' 3\" (1.905 m)   Wt 98 kg (216 lb)   SpO2 98%   BMI 27.00 kg/m²     Physical Exam  Vitals and nursing note reviewed.   Constitutional:       General: He is not in acute distress.     Appearance: He is well-developed. He is not diaphoretic.   HENT:      Head: Normocephalic and atraumatic.   Pulmonary:      Effort: Pulmonary effort is normal.     Musculoskeletal:      Right lower leg: No edema.      Left lower leg: No edema.     Skin:     General: Skin is warm.     Neurological:      Mental Status: He is alert and oriented to person, place, and time.           Results   Lab Results   Component Value " Date    PSA 5.535 (H) 06/21/2025    PSA 7.168 (H) 12/03/2024    PSA 6.640 (H) 11/19/2024     Lab Results   Component Value Date    GLUCOSE 95 10/08/2018    CALCIUM 10.0 06/21/2025     12/18/2015    K 5.0 06/21/2025    CO2 28 06/21/2025     06/21/2025    BUN 21 06/21/2025    CREATININE 1.07 06/21/2025     Lab Results   Component Value Date    WBC 5.85 06/21/2025    HGB 17.1 (H) 06/21/2025    HCT 51.7 (H) 06/21/2025    MCV 95 06/21/2025     06/21/2025       Office Urine Dip  No results found for this or any previous visit (from the past hour).

## 2025-06-24 NOTE — ASSESSMENT & PLAN NOTE
Lab Results   Component Value Date    EGFR 67 06/21/2025    EGFR 58 12/03/2024    EGFR 60 09/06/2024    CREATININE 1.07 06/21/2025    CREATININE 1.20 12/03/2024    CREATININE 1.18 09/06/2024     Stable at this time.  Check labs prior to follow-up.  Orders:    Hemoglobin A1C; Future

## 2025-06-24 NOTE — ASSESSMENT & PLAN NOTE
Wt Readings from Last 3 Encounters:   06/24/25 97.7 kg (215 lb 6.4 oz)   06/24/25 98 kg (216 lb)   12/11/24 106 kg (234 lb)     Stable at this time.  No signs of excessive fluid overload.  He is off furosemide.          Orders:    CBC and differential; Future    Comprehensive metabolic panel; Future    Lipid Panel with Direct LDL reflex; Future

## 2025-06-24 NOTE — ASSESSMENT & PLAN NOTE
Prostate MRI March 2024: 48 cc gland, 1 cm left PI-RADS 3 lesion, no EPE.  Latest PSA range 5-8, current 5.513% free.  No other biomarkers assessed.    Reviewed his history of the past 2 years and testing thus far.   There is a prostate nodule on MRI but has a lower risk of significant prostate cancer as pirads 3 compared to a 4 or 5 which would certainly prompt a biopsy  PSA density 0.11  Discussed options- fusion biopsy, observation of PSA trend, or additional prostate biomarker such as 4K score or myprostate score 2.0    MPS 2.0 ordered for pt home collection- reviewed collection, billing, and delivery through [facility]. will get results in 1-2 weeks and call him. if the biomarker is high risk would pursue fusion bx with existing MRI. If biomarker is low risk, will continue observation alone.       Lab Results   Component Value Date    PSA 5.535 (H) 06/21/2025    PSA 7.168 (H) 12/03/2024    PSA 6.640 (H) 11/19/2024

## 2025-06-27 PROBLEM — I82.5Y2 CHRONIC DEEP VEIN THROMBOSIS (DVT) OF PROXIMAL VEIN OF LEFT LOWER EXTREMITY (HCC): Status: RESOLVED | Noted: 2024-09-10 | Resolved: 2025-06-27

## 2025-07-10 ENCOUNTER — TELEPHONE (OUTPATIENT)
Dept: OTHER | Facility: HOSPITAL | Age: 75
End: 2025-07-10

## 2025-07-10 DIAGNOSIS — R97.20 ELEVATED PSA: Primary | ICD-10-CM

## 2025-07-10 NOTE — TELEPHONE ENCOUNTER
"seen by me recently. has MRI from march 2024 pirads 3. no prior biopsy yet  biomarker has just resulted -- higher risk    I think fusion biopsy will be warranted this year  should get updated MRI first as the last one is almost 18 months old by that time    MRI then schedule him a visit with AP for \"preop\" for fusion bx consent  "

## 2025-07-11 NOTE — TELEPHONE ENCOUNTER
Called and left a voicemail for patient to return call to the office to review KLAUDIA Velez's note/recommendations.

## 2025-07-29 ENCOUNTER — HOSPITAL ENCOUNTER (OUTPATIENT)
Facility: MEDICAL CENTER | Age: 75
Discharge: HOME | End: 2025-07-29
Attending: PHYSICIAN ASSISTANT
Payer: MEDICARE

## 2025-07-29 DIAGNOSIS — R97.20 ELEVATED PSA: ICD-10-CM

## 2025-07-29 PROCEDURE — 72197 MRI PELVIS W/O & W/DYE: CPT

## 2025-07-29 PROCEDURE — 76377 3D RENDER W/INTRP POSTPROCES: CPT

## 2025-07-29 PROCEDURE — A9585 GADOBUTROL INJECTION: HCPCS | Performed by: PHYSICIAN ASSISTANT

## 2025-07-29 RX ORDER — GADOBUTROL 604.72 MG/ML
9 INJECTION INTRAVENOUS
Status: COMPLETED | OUTPATIENT
Start: 2025-07-29 | End: 2025-07-29

## 2025-07-29 RX ADMIN — GADOBUTROL 9 ML: 604.72 INJECTION INTRAVENOUS at 12:53

## 2025-08-05 ENCOUNTER — OFFICE VISIT (OUTPATIENT)
Dept: UROLOGY | Facility: CLINIC | Age: 75
End: 2025-08-05
Payer: MEDICARE

## 2025-08-05 VITALS
SYSTOLIC BLOOD PRESSURE: 136 MMHG | DIASTOLIC BLOOD PRESSURE: 82 MMHG | HEART RATE: 84 BPM | WEIGHT: 216 LBS | BODY MASS INDEX: 27.72 KG/M2 | OXYGEN SATURATION: 94 % | HEIGHT: 74 IN

## 2025-08-05 DIAGNOSIS — R97.20 ELEVATED PSA: Primary | ICD-10-CM

## 2025-08-05 PROCEDURE — 99214 OFFICE O/P EST MOD 30 MIN: CPT | Performed by: PHYSICIAN ASSISTANT

## 2025-08-14 ENCOUNTER — TELEPHONE (OUTPATIENT)
Age: 75
End: 2025-08-14

## (undated) DEVICE — GAUZE SPONGES,16 PLY: Brand: CURITY

## (undated) DEVICE — TUBING SUCTION 5MM X 12 FT

## (undated) DEVICE — GLOVE SRG BIOGEL 8

## (undated) DEVICE — AIRLIFE™ TRI-FLO™ SUCTION CATHETER WITH CONTROL PORT: Brand: AIRLIFE™

## (undated) DEVICE — SUT FIBERLOOP 4-0 3/8 CIRCLE TPR 12IN AR-7229-12

## (undated) DEVICE — DISPOSABLE EQUIPMENT COVER: Brand: SMALL TOWEL DRAPE

## (undated) DEVICE — SUT ETHILON 3-0 PS-1 18 IN 1663H

## (undated) DEVICE — NEEDLE 25G X 1 1/2

## (undated) DEVICE — PAD CAST 3 IN COTTON NON STRL

## (undated) DEVICE — SPONGE SCRUB 4 PCT CHLORHEXIDINE

## (undated) DEVICE — CURITY NON-ADHERENT STRIPS: Brand: CURITY

## (undated) DEVICE — INTENDED FOR TISSUE SEPARATION, AND OTHER PROCEDURES THAT REQUIRE A SHARP SURGICAL BLADE TO PUNCTURE OR CUT.: Brand: BARD-PARKER ® CARBON RIB-BACK BLADES

## (undated) DEVICE — SUT PROLENE 6-0 BV-1/BV-1 24 IN 8805H

## (undated) DEVICE — ALUMI-HAND POSITIONER LG

## (undated) DEVICE — ACE WRAP 4 IN STERILE

## (undated) DEVICE — SPLINT 4 X 15 IN FAST SET PLASTER

## (undated) DEVICE — LIGHT HANDLE COVER SLEEVE DISP BLUE STELLAR

## (undated) DEVICE — SUT ETHILON 4-0 PS-2 18 IN 1667H

## (undated) DEVICE — STERILE BETHLEHEM PLASTIC HAND: Brand: CARDINAL HEALTH

## (undated) DEVICE — SUT ETHIBOND 3-0 SH 30 IN X832H